# Patient Record
Sex: MALE | Race: WHITE | NOT HISPANIC OR LATINO | Employment: FULL TIME | URBAN - METROPOLITAN AREA
[De-identification: names, ages, dates, MRNs, and addresses within clinical notes are randomized per-mention and may not be internally consistent; named-entity substitution may affect disease eponyms.]

---

## 2023-07-19 ENCOUNTER — TRANSFERRED RECORDS (OUTPATIENT)
Dept: HEALTH INFORMATION MANAGEMENT | Facility: CLINIC | Age: 63
End: 2023-07-19

## 2024-02-13 ENCOUNTER — TRANSCRIBE ORDERS (OUTPATIENT)
Dept: OTHER | Age: 64
End: 2024-02-13

## 2024-02-13 ENCOUNTER — TRANSFERRED RECORDS (OUTPATIENT)
Dept: ONCOLOGY | Facility: CLINIC | Age: 64
End: 2024-02-13
Payer: COMMERCIAL

## 2024-02-13 DIAGNOSIS — D46.9 MYELODYSPLASTIC SYNDROME (H): Primary | ICD-10-CM

## 2024-02-15 ENCOUNTER — PATIENT OUTREACH (OUTPATIENT)
Dept: ONCOLOGY | Facility: CLINIC | Age: 64
End: 2024-02-15
Payer: COMMERCIAL

## 2024-02-15 NOTE — PROGRESS NOTES
[facility]: Cancer Care                                                                   Hem/Onc  Referral reviewed  Adult Oncology/Hematology  Referral [[id number]]    Electronically signed by: Blakemore, Thirisia on 02/13/24 1120 Status: Active   Ordering user: Blakemore, Thirisia 02/13/24 1120 Ordering provider: Abstract, Provider     Referral Details    Referred By  Referred To   [facility] External Data Department    Diagnoses: Myelodysplastic syndrome (H)   Order: Adult Oncology/Hematology  Referral       Comment: Referral for consult for Myelodysplastic Syndrome. Referred by Dr. August Mcgee MD at [facility] [address] [place] [address]. Ph. [phone] Fax. [phone]. Referral from fax.        ASSESSMENT      Clinical History (per Nurse review of records provided):    63 year old male patient with MDS referred by oncologist in [place], [place]  Lives in [facility]     Records Location: [facility]     Medical Oncology Follow Up    Date of Visit: 2/12/24    PATIENT: Willie Charles, 63yr old male   PCP: Steff Jones DO  MRN: [id number]    Reason for Visit: MDS.     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  - Prior therapy - Azacitidine. Currently on low dose revlimid 2.5 mg per [facility] recommendation. M/W/F 3 weeks on 1 week off due to hypocellular marrow and +2 fibrosis.  - Will schedule repeat bone marrow biopsy. He has an appointment with [facility] by the end of March. He wishes to be referred to [facility] and the [facility] due to closer distance and since his family members live in [place].   - He is currently week 3 of revlimid. He will take the last dose this Thursday and then 1 week off. Will schedule bone marrow biopsy next week.   - Weekly lab. RTC in 2 weeks.    # Neutropenia 2/2 MDS and chemo.  ANC 0.9. Continue acyclovir. Willhold fluconazole and levofloxacin if ANC > 0.5.    #  Anemia 2/2 MDS.  - Workup at [facility] 11/2023; vitamin B6 copper LDH PNH testing all WNL.  - Consider transfusion if hemoglobin is less than 7.5 or symptomatic.    #Elevated ALT. Grade 1.   - Mildly elevated ALT. Monitor.    #Monoclonal B-cell.  CT scan was recommended by Dr. Squires to ensure there is no lymphoma. CT C/A/P was negative for lymphadenopathy.    #Mucositis.  Possibly due to chemo, mucositis or both.  - PRN oral magic mouth wash. Trial of oral dexa elixir. Swish around the tip of the tongue only and spit.     August Mcgee MD  Medical Oncology and Hematology  [facility]  INTERVENTION(S)                                                      February 15, 2024  OUTGOING CALL to pt, no answer. Left voicemail introducing my role as nurse navigator with [facility] hematology/oncology clinics and that we have recd the referral to  Requested callback to me directly to discuss timing and factors involved since he has upcoming BMBx, and oncology care locally and at [facility].    February 21, 2024  OUTGOING CALL to pt:  Introduced my role as nurse navigator with [facility] Hematology/Oncology dept and that we have recd the referral for dx of MDS    Pt confirms they are aware of the referral and states he had the bmbx today somewhat groggy, requests a  call him tomorrow when offered.  He clarifies that this will be a 3rd opinion, not necessarily a transfer of care. Explained that our records team will request slides from bmbx for [facility] path consult.Explained to pt that they will receive a call from our scheduling intake team and provided our call-back number below if needed. He has my direct number in  left on 2/15 if they have interim questions, concerns.  -Referral Triage Scheduling Instructions added to Hem/Onc  referral for Patient Access rep      PLAN                                                      Hem/Onc consult at [facility]  Cancer Care   [facility]       See records team's Pre-Visit encounter documentation for additional records retrieval information.    Abby Dunn, RN, BSN, OCN  Hematology/Oncology New Patient Nurse Navigator   [facility]  [phone]

## 2024-02-28 NOTE — TELEPHONE ENCOUNTER
RECORDS STATUS - ALL OTHER DIAGNOSIS      Action February 28, 2024 2:24 PM    Action Taken - Slides and is requested from [facility] sent to Valerio at [facility].      RECORDS RECEIVED FROM: [facility]   DATE RECEIVED:    NOTES STATUS DETAILS   OFFICE NOTE from referring provider [facility] 2/12/24: Dr. August Masters   OFFICE NOTE from medical oncologist [facility] 11/14/24: Dr. Lucas Covarrubias   OPERATIVE REPORT [facility] 7/19/23: BMB   MEDICATION LIST [facility]    LABS     PATHOLOGY REPORTS Report in [facility]  (Slides Requested)  [facility] [place] Tr[facility] #: [id number] 7/19/23: [id number]    Cytometry:  7/19/23: [id number]   ANYTHING RELATED TO DIAGNOSIS [facility] Most recent 2/24/24   GENONOMIC TESTING     TYPE: External: [facility] 8/11/23: [id number]

## 2024-03-22 ENCOUNTER — APPOINTMENT (OUTPATIENT)
Dept: LAB | Facility: CLINIC | Age: 64
End: 2024-03-22
Payer: COMMERCIAL

## 2024-03-22 ENCOUNTER — PATIENT OUTREACH (OUTPATIENT)
Dept: ONCOLOGY | Facility: CLINIC | Age: 64
End: 2024-03-22
Payer: COMMERCIAL

## 2024-03-22 ENCOUNTER — PATIENT OUTREACH (OUTPATIENT)
Dept: ONCOLOGY | Facility: CLINIC | Age: 64
End: 2024-03-22

## 2024-03-22 ENCOUNTER — PRE VISIT (OUTPATIENT)
Dept: ONCOLOGY | Facility: CLINIC | Age: 64
End: 2024-03-22
Payer: COMMERCIAL

## 2024-03-22 ENCOUNTER — ONCOLOGY VISIT (OUTPATIENT)
Dept: ONCOLOGY | Facility: CLINIC | Age: 64
End: 2024-03-22
Attending: STUDENT IN AN ORGANIZED HEALTH CARE EDUCATION/TRAINING PROGRAM
Payer: COMMERCIAL

## 2024-03-22 VITALS
DIASTOLIC BLOOD PRESSURE: 76 MMHG | RESPIRATION RATE: 19 BRPM | BODY MASS INDEX: 31.95 KG/M2 | HEART RATE: 98 BPM | HEIGHT: 69 IN | SYSTOLIC BLOOD PRESSURE: 146 MMHG | TEMPERATURE: 98.3 F | OXYGEN SATURATION: 98 % | WEIGHT: 215.7 LBS

## 2024-03-22 DIAGNOSIS — D46.9 MDS (MYELODYSPLASTIC SYNDROME) (H): Primary | ICD-10-CM

## 2024-03-22 DIAGNOSIS — D46.9 MDS (MYELODYSPLASTIC SYNDROME) (H): ICD-10-CM

## 2024-03-22 DIAGNOSIS — D46.9 MYELODYSPLASTIC SYNDROME (H): ICD-10-CM

## 2024-03-22 LAB
ALBUMIN SERPL BCG-MCNC: 4 G/DL (ref 3.5–5.2)
ALP SERPL-CCNC: 94 U/L (ref 40–150)
ALT SERPL W P-5'-P-CCNC: 126 U/L (ref 0–70)
ANION GAP SERPL CALCULATED.3IONS-SCNC: 9 MMOL/L (ref 7–15)
AST SERPL W P-5'-P-CCNC: 43 U/L (ref 0–45)
BASOPHILS # BLD AUTO: 0 10E3/UL (ref 0–0.2)
BASOPHILS NFR BLD AUTO: 1 %
BILIRUB SERPL-MCNC: 0.2 MG/DL
BUN SERPL-MCNC: 16 MG/DL (ref 8–23)
CALCIUM SERPL-MCNC: 9 MG/DL (ref 8.8–10.2)
CHLORIDE SERPL-SCNC: 107 MMOL/L (ref 98–107)
CREAT SERPL-MCNC: 0.92 MG/DL (ref 0.67–1.17)
DEPRECATED HCO3 PLAS-SCNC: 24 MMOL/L (ref 22–29)
EGFRCR SERPLBLD CKD-EPI 2021: >90 ML/MIN/1.73M2
EOSINOPHIL # BLD AUTO: 0.1 10E3/UL (ref 0–0.7)
EOSINOPHIL NFR BLD AUTO: 4 %
ERYTHROCYTE [DISTWIDTH] IN BLOOD BY AUTOMATED COUNT: 12.8 % (ref 10–15)
GLUCOSE SERPL-MCNC: 163 MG/DL (ref 70–99)
HCT VFR BLD AUTO: 20.3 % (ref 40–53)
HGB BLD-MCNC: 7.1 G/DL (ref 13.3–17.7)
IMM GRANULOCYTES # BLD: 0 10E3/UL
IMM GRANULOCYTES NFR BLD: 0 %
LYMPHOCYTES # BLD AUTO: 2 10E3/UL (ref 0.8–5.3)
LYMPHOCYTES NFR BLD AUTO: 63 %
MCH RBC QN AUTO: 29.2 PG (ref 26.5–33)
MCHC RBC AUTO-ENTMCNC: 35 G/DL (ref 31.5–36.5)
MCV RBC AUTO: 84 FL (ref 78–100)
MONOCYTES # BLD AUTO: 0.2 10E3/UL (ref 0–1.3)
MONOCYTES NFR BLD AUTO: 6 %
NEUTROPHILS # BLD AUTO: 0.8 10E3/UL (ref 1.6–8.3)
NEUTROPHILS NFR BLD AUTO: 26 %
NRBC # BLD AUTO: 0 10E3/UL
NRBC BLD AUTO-RTO: 0 /100
PLATELET # BLD AUTO: 180 10E3/UL (ref 150–450)
POTASSIUM SERPL-SCNC: 4.2 MMOL/L (ref 3.4–5.3)
PROT SERPL-MCNC: 6.6 G/DL (ref 6.4–8.3)
RBC # BLD AUTO: 2.43 10E6/UL (ref 4.4–5.9)
SODIUM SERPL-SCNC: 140 MMOL/L (ref 135–145)
URATE SERPL-MCNC: 4.1 MG/DL (ref 3.4–7)
WBC # BLD AUTO: 3.1 10E3/UL (ref 4–11)

## 2024-03-22 PROCEDURE — 85025 COMPLETE CBC W/AUTO DIFF WBC: CPT | Performed by: STUDENT IN AN ORGANIZED HEALTH CARE EDUCATION/TRAINING PROGRAM

## 2024-03-22 PROCEDURE — 80053 COMPREHEN METABOLIC PANEL: CPT | Performed by: STUDENT IN AN ORGANIZED HEALTH CARE EDUCATION/TRAINING PROGRAM

## 2024-03-22 PROCEDURE — 99205 OFFICE O/P NEW HI 60 MIN: CPT | Performed by: STUDENT IN AN ORGANIZED HEALTH CARE EDUCATION/TRAINING PROGRAM

## 2024-03-22 PROCEDURE — 84550 ASSAY OF BLOOD/URIC ACID: CPT | Performed by: STUDENT IN AN ORGANIZED HEALTH CARE EDUCATION/TRAINING PROGRAM

## 2024-03-22 PROCEDURE — 99213 OFFICE O/P EST LOW 20 MIN: CPT | Performed by: STUDENT IN AN ORGANIZED HEALTH CARE EDUCATION/TRAINING PROGRAM

## 2024-03-22 PROCEDURE — G2211 COMPLEX E/M VISIT ADD ON: HCPCS | Performed by: STUDENT IN AN ORGANIZED HEALTH CARE EDUCATION/TRAINING PROGRAM

## 2024-03-22 PROCEDURE — 99417 PROLNG OP E/M EACH 15 MIN: CPT | Performed by: STUDENT IN AN ORGANIZED HEALTH CARE EDUCATION/TRAINING PROGRAM

## 2024-03-22 PROCEDURE — 36415 COLL VENOUS BLD VENIPUNCTURE: CPT | Performed by: STUDENT IN AN ORGANIZED HEALTH CARE EDUCATION/TRAINING PROGRAM

## 2024-03-22 PROCEDURE — 82668 ASSAY OF ERYTHROPOIETIN: CPT | Performed by: STUDENT IN AN ORGANIZED HEALTH CARE EDUCATION/TRAINING PROGRAM

## 2024-03-22 RX ORDER — MONTELUKAST SODIUM 10 MG/1
10 TABLET ORAL AT BEDTIME
COMMUNITY
Start: 2022-12-19

## 2024-03-22 RX ORDER — LANOLIN ALCOHOL/MO/W.PET/CERES
1000 CREAM (GRAM) TOPICAL DAILY
COMMUNITY
Start: 2023-05-30

## 2024-03-22 RX ORDER — FLUTICASONE PROPIONATE 50 MCG
1 SPRAY, SUSPENSION (ML) NASAL DAILY
COMMUNITY
Start: 2022-12-19

## 2024-03-22 RX ORDER — DEXAMETHASONE 0.5 MG/5ML
0.1 ELIXIR ORAL DAILY
COMMUNITY
Start: 2024-02-12 | End: 2025-02-16

## 2024-03-22 RX ORDER — AMLODIPINE BESYLATE 10 MG/1
1 TABLET ORAL DAILY
COMMUNITY
Start: 2022-12-19

## 2024-03-22 RX ORDER — METOPROLOL SUCCINATE 50 MG/1
1 TABLET, EXTENDED RELEASE ORAL DAILY
COMMUNITY
Start: 2022-12-19

## 2024-03-22 RX ORDER — LEVOTHYROXINE SODIUM 150 UG/1
TABLET ORAL
COMMUNITY
Start: 2023-02-27

## 2024-03-22 RX ORDER — SILDENAFIL CITRATE 20 MG/1
TABLET ORAL
COMMUNITY
Start: 2022-12-19

## 2024-03-22 RX ORDER — FOLIC ACID 1 MG/1
1 TABLET ORAL DAILY
COMMUNITY
Start: 2023-05-30

## 2024-03-22 RX ORDER — TIMOLOL MALEATE 5 MG/ML
1 SOLUTION/ DROPS OPHTHALMIC 2 TIMES DAILY
COMMUNITY
Start: 2022-11-15

## 2024-03-22 RX ORDER — LORATADINE 10 MG/1
10 TABLET ORAL DAILY
COMMUNITY

## 2024-03-22 RX ORDER — ASPIRIN 81 MG/1
81 TABLET ORAL DAILY
COMMUNITY
Start: 2023-08-15

## 2024-03-22 ASSESSMENT — PAIN SCALES - GENERAL: PAINLEVEL: NO PAIN (0)

## 2024-03-22 NOTE — PROGRESS NOTES
[facility]  HEMATOLOGY & ONCOLOGY  NEW PATIENT VISIT    PATIENT NAME: Willie Charles          MRN # [id number]  YOB: 1960  DATE OF VISIT:  Mar 22, 2024            REFERRING PROVIDER:   Mr. Willie Charles was seen at the request of Arely for further evaluation and/or management.       History of Presenting Illness  Mr. Willie Charles is a pleasant 63 year old male with a past medical history significant for hyperthyroisidism s/p radioactive iodine, HTN  and MDS dx in 7/2023 after he had fatigue for several months found to have bicytopenia with WBC 3.3 and hemoglobin 7.0 and platelets 308 that led to a bone marrow biopsy on 7/19/2023 that showed hypocellular marrow with 10 to 20% cellularity and 2% blasts with megakaryocytic hyperplasia with dysplasia.  Cytogenetics showed 5 q. deletion and NGS showed BCORL1 with a VAF of 3%.  He was started on lenalidomide 5 mg daily on 8/11/2023.  His anemia worsened after a few days and he was then started on concurrent azacitidine in addition to Revlimid on 8/28/2023.  As per records his counts trended down by 9/2023 and his Revlimid was discontinued.  He was continued on 2 more cycles of azacitidine until October 2023.  He did see Dr. Squires at [facility] in 11/2023 and was recommended to restart Revlimid starting at 2.5 mg dosing every other day for 3 weeks on and 1 week off.  He was started on 2.5 mg 2 times a week  and has been tolerating that for the last 3 months.  He has continued to require about 2-4 prbcs in a month.  Denies any fevers or chills or ongoing bleeding from anywhere.    Subjective  Patient is here with his wife Monica and son.  Patient denies any major side effects from the lenalidomide including diarrhea, nausea or rashes or any other recent infections.  He had previously been on antibacterial as well as antifungal medications when his absolute neutrophil count had trended down.  He is here at the [facility] for a  "second opinion.      Past Medical History  History reviewed. No pertinent past medical history.    Family History  History reviewed. No pertinent family history.    Social History  Smoking: Never  Alcohol use: occasionally drinks alcohol  Status:   Children/grandchildren: Did not ask.   Occupation: Housing  , .     [employer]  Current Outpatient Medications  Current Outpatient Medications   Medication Sig Dispense Refill    amLODIPine (NORVASC) 10 MG tablet Take 1 tablet by mouth daily      aspirin 81 MG EC tablet Take 81 mg by mouth      cyanocobalamin (VITAMIN B-12) 1000 MCG tablet Take 1,000 mcg by mouth      dexAMETHasone (DECADRON) 0.5 MG/5ML elixir Take 0.1 mg by mouth      fluticasone (FLONASE) 50 MCG/ACT nasal spray Spray 1 spray in nostril      folic acid (FOLVITE) 1 MG tablet Take 1 mg by mouth      levothyroxine (SYNTHROID/LEVOTHROID) 150 MCG tablet TAKE ONE TABLET BY MOUTH ONCE DAILY. DO NOT TAKE WITH IRON,ALUMINUM,MAGNESIUM,OR CALCIUM      loratadine (CLARITIN) 10 MG tablet Take 10 mg by mouth      metoprolol succinate ER (TOPROL XL) 50 MG 24 hr tablet Take 1 tablet by mouth daily      montelukast (SINGULAIR) 10 MG tablet Take 10 mg by mouth      sildenafil (REVATIO) 20 MG tablet Take 1 tab on empty stomach one hour prior to sexual activity; may increase to a max of 5 tabs      timolol maleate (TIMOPTIC) 0.5 % ophthalmic solution Apply 1 drop to eye         Allergies  Allergies   Allergen Reactions    Benazepril Other (See Comments)     Critical    Penicillin V Rash       Review of systems  A complete ROS was performed and was negative except as mentioned in HPI    Physical Exam  Vitals:    03/22/24 0942   BP: (!) 146/76   BP Location: Right arm   Patient Position: Sitting   Cuff Size: Adult Large   Pulse: 98   Resp: 19   Temp: 98.3  F (36.8  C)   TempSrc: Oral   SpO2: 98%   Weight: 97.8 kg (215 lb 11.2 oz)   Height: 1.74 m (5' 8.5\")     Wt Readings from Last 3 Encounters: " "  24 97.8 kg (215 lb 11.2 oz)       ECO-2   male sitting in chair in NAD  HEET: NC/AT, EOMI w/ PERRL, anicteric sclera. MMM. No mouth sores.   Neck: supple no JVP  Lymph: No cervical, supraclavicular, axillary or inguinal LAD  CV: normal S1,S2 with RRR no m/r/g  Resp: lungs CTA bilaterally with adequate air movement. No wheezes or crackles  Abd: soft, NTND, no organomegaly or masses. BS normoactive.   Ext: WWP no edema or cyanosis  Skin: no concerning lesions or rashes or petechiae  Neuro: A&Ox4, no lateralizing sx. Grossly nonfocal. Strength appears preserved.       Labs and Imaging    Sodium   Date Value Ref Range Status   2024 140 135 - 145 mmol/L Final     Comment:     Reference intervals for this test were updated on 2023 to more accurately reflect our healthy population. There may be differences in the flagging of prior results with similar values performed with this method. Interpretation of those prior results can be made in the context of the updated reference intervals.     ,   Potassium   Date Value Ref Range Status   2024 4.2 3.4 - 5.3 mmol/L Final      Creatinine   Date Value Ref Range Status   2024 0.92 0.67 - 1.17 mg/dL Final       No results found for: \"LDH\"      Uric Acid   Date Value Ref Range Status   2024 4.1 3.4 - 7.0 mg/dL Final       WBC Count   Date Value Ref Range Status   2024 3.1 (L) 4.0 - 11.0 10e3/uL Final   ,   Hemoglobin   Date Value Ref Range Status   2024 7.1 (L) 13.3 - 17.7 g/dL Final   ],   Platelet Count   Date Value Ref Range Status   2024 180 150 - 450 10e3/uL Final   ,    MCV   Date Value Ref Range Status   2024 84 78 - 100 fL Final         EPO level was 1213 on 2023.        Bone marrow biopsy 2023  FINAL DIAGNOSIS   Peripheral blood, bone marrow aspirate, biopsy and clot sections ([id number]; 2023):     1. Myelodysplastic syndrome with isolated del(5q).     2. Two small monotypic B-cell " populations (3% and 11% of total events) of uncertain significance, reportedly   detected by flow cytometric analysis. See comment.     COMMENT   The significance of the small monotypic B-cell populations identified by flow cytometric analysis is uncertain, as   diagnostic features of lymphoma are not seen by morphology or demonstrated by immunohistochemistry. These findings may   represent monoclonal B-cell lymphocytosis, although minimal bone marrow involvement by B-cell lymphoma cannot be   excluded. Clinical and radiographic correlation is recommended.  A lymph node or other extramedullary tissue   biopsy is suggested if lymphoma is clinically and/or radiographically suspected.   Cytogenetics 5q deletion.   NGS BCORL1 VAF 3%      Bone marrow 2/21/2024  Peripheral blood, bone marrow aspirate, touch imprint, core biopsy, and clot:     -  Pancytopenia.     -  Inadequate bone marrow aspirate, core biopsy and clot sections.      -  The flow cytometry ([id number]) of the bone marrow shows approximately 3% monotypic B-cells positive for CD5, CD19, CD20, CD23,  and lambda, approximately 14% monotypic B-cells positive for CD5 (variable), CD19, CD20 (dim to negative), CD23 (dim), CD38 (parital) and , with no definitive light chain expression, and no increased blast population identified.   The marrow aspirate is hemodilute. The clot sections show no marrow tissue. The biopsy core shows a tiny area (<5% core sections) with a cellular (~10%) bone marrow showing tri-lineage cells including clusters of small hypolobated megakaryocytes. The immunohistochemistry on the biopsy sections appears to show increased megakaryocytes and no definitive diagnostic features of lymphoma involvement. The presence of increased small hypolobated megakaryocytes suggests persistent disease of patient's known MDS with 5q deletion. Chromosome studies show that although 20 metaphases are routinely examined, only 1 was identified and  appeared normal.   The significance of the two populations of monotypic B-cells identified by flow cytometry is uncertain, as diagnostic features of lymphoma are not seen by morphology or immunohistochemistry in this inadequate marrow biopsy specimen. These findings may represent monoclonal B-cell lymphocytosis of undetermined significance or involvement of the peripheral blood by a B-cell lymphoma, although bone marrow involvement by B-cell lymphoma cannot be excluded.     Assessment and Plan  Mr. Willie Charles is a 63 year old male who is here for further evaluation and/or management of MDS.    Oncology:  MDS with 5q deletion  WHO Classification: MDS w low blasts and 5q deletion  Date of diagnosis: 7/19/2023  Revised IPSS score: 2.5 Low  Molecular IPSS: -0.42 Moderate low  Bone Marrow Blast %: 2  Cytogenetics: 5q del  Molecular: BCORL1 VAF 3%  Past Treatment: lenalidomide 5mg on 8/11/2023 to 9/2023 with addition of azacitidine since 8/28/2023 until 10/2023.   Current Treatment: Restarted lenalidomide 2.5mg twice a week for 3 weeks on 1 week off since 11/2023. Off since 3/12.     I discussed the pathophysiology and natural history of MDS with Mr. Willie Charles today. We went over the current prognostic models and scoring systems that are used in clinical practice as well as the potential for disease evolution into acute myeloid leukemia. We went over the current FDA approved treatments for myelodysplastic syndromes and covered that the only curative option is through an allogeneic hematopoietic cell transplantation. His disease is  Moderate low  risk and currently BMT is not recommended.     We discussed that MDS with 5 q. responds well to lenalidomide and that he is not received adequate amount of treatment with lenalidomide.  Given that he is tolerating lenalidomide at this very low minimal dose I recommended that he increase the lenalidomide to 2.5 mg 5 times a week for 3 weeks on and then 1 week off.   If he continues to tolerate this well we can continue increasing the dose to eventually reach 10 mg 3 weeks on and 1 week off.  He can possibly be started on other agents if his anemia is not responding to lenalidomide including agents like Luspatercept.    Plan:  he has had a molecular panel, we will ask for pathology slides and NGS studies from 2/21/2024.   labs checked today - see above results.  - RTC with me as virtual appointment in 1 month.   - c/w lenalidomide 2.5mg 5 days a week, 2 days off and 3 weeks on and 1 week off.   - c/w twice weekly labs and as needed transfusions at local oncologist.  - If ANC trends below 500 consistently for more than 1 week can start levofloxacin as prophylaxis.     Hematology:  Anemia/Thrombocytopenia:   Transfuse leukocyte reduced and irradiated blood products: 1 unit pRBC if hgb is 7.0-7.9, 2 units pRBCs if Hgb 6.0-6.9 g/dl, 1 unit platelets if PLT count is <20,000 or <50,000 with clinical bleeding.    Immunocompromised:  As a result of his disease and/or previous treatment. Mr. Willie Charles is immunocompromised. his last ANC is >500 and there is no need for prophylactic antibiotics at this time.     Cardiac:  Mr. Willie Charles has no history of heart disease.     Renal:  Creatinine results reviewed today. Mr. Willie Charles does not have any renal disease.    Hepatic:  Liver function tests reviewed today.    Psychosocial:  Mr. Willie Charles is coping well with his diagnosis.     All other questions and concerns were addressed and answered to Mr. Willie Charles's satisfaction.    Today, I spent a total of 110 minutes of face-to-face and non face-to-face time with Mr. Willie Charles. Time spent included review and discussion of diagnostic test results, patient counseling, and coordination of care.    The longitudinal plan of care for the diagnosis(es)/condition(s) as documented were addressed during this visit. Due to the added complexity in care, I will  continue to support Willie in the subsequent management and with ongoing continuity of care.    Haroon Ellis MD    Division of Hematology, Oncology and Transplantation  [facility]  P: [phone]

## 2024-03-22 NOTE — PROGRESS NOTES
[facility]: Cancer Care Initial Note                                    Discussion with Patient:                                                      Met with Gerg after office visit/consult with Dr. Ellis. Introduced self as RN Care Coordinator. Went over contact information for [facility]. Discussed roles of RNCC, MD, KARLO, nurse triage line, and clinic coordinators. Discussed how to get a hold of different team members via [employer] and at [phone]. Authorization to discuss information form signed, gave permission to speak with spouse Monica, and step-son Romario.       Assessment:                                                      Initial  Current living arrangement:: I live in a private home with family  Type of residence:: Private home - stairs  Informal Support system:: Family  Equipment Currently Used at Home: none  Bed or wheelchair confined:: No  Mobility Status: Independent  Transportation means:: Regular car  Medication adherence problem (GOAL):: No  Knowledgeable about how to use meds:: Yes  Medication side effects suspected:: No      Intervention/Education provided during outreach:                                                       Patient see's a local oncologist closer to him, oncologist's rotate through the cancer center so often does not see a consistent person.   Will see Dr. Ellis intermittently for follow up.   Currently getting PO chemo through local oncology office and will continue to at this time.   Brandt for medication ran out, advised speaking with current pharmacy staff about extending a brandt, and reaching out to [facility] for further assistance.   Multiple questions about switching to medicare and insurance, provided phone number for senior linkage line.   Will follow up virtually with Dr. Ellis in one month.     Patient to follow up as scheduled at next appt  Patient to call/[employer] message with updates  Confirmed patient has clinic and triage  numbers    Signature:  Abby Reyes RN

## 2024-03-22 NOTE — NURSING NOTE
Chief Complaint   Patient presents with    Blood Draw     Labs drawn via  by RN.      Labs drawn with  by RN. Vitals taken. Patient checked into next appointment.    Kim Cao RN

## 2024-03-22 NOTE — LETTER
3/22/2024         RE: Willie Charles  [address]  [facility] [address]        Dear Colleague,    Thank you for referring your patient, Willie Charles, to the [facility]. Please see a copy of my visit note below.    [facility]  HEMATOLOGY & ONCOLOGY  NEW PATIENT VISIT    PATIENT NAME: Willie Charles          MRN # [id number]  YOB: 1960  DATE OF VISIT:  Mar 22, 2024            REFERRING PROVIDER:   Mr. Willie Charles was seen at the request of Arely for further evaluation and/or management.       History of Presenting Illness  Mr. Willie Charles is a pleasant 63 year old male with a past medical history significant for hyperthyroisidism s/p radioactive iodine, HTN  and MDS dx in 7/2023 after he had fatigue for several months found to have bicytopenia with WBC 3.3 and hemoglobin 7.0 and platelets 308 that led to a bone marrow biopsy on 7/19/2023 that showed hypocellular marrow with 10 to 20% cellularity and 2% blasts with megakaryocytic hyperplasia with dysplasia.  Cytogenetics showed 5 q. deletion and NGS showed BCORL1 with a VAF of 3%.  He was started on lenalidomide 5 mg daily on 8/11/2023.  His anemia worsened after a few days and he was then started on concurrent azacitidine in addition to Revlimid on 8/28/2023.  As per records his counts trended down by 9/2023 and his Revlimid was discontinued.  He was continued on 2 more cycles of azacitidine until October 2023.  He did see Dr. Squires at [facility] in 11/2023 and was recommended to restart Revlimid starting at 2.5 mg dosing every other day for 3 weeks on and 1 week off.  He was started on 2.5 mg 2 times a week  and has been tolerating that for the last 3 months.  He has continued to require about 2-4 prbcs in a month.  Denies any fevers or chills or ongoing bleeding from anywhere.    Subjective  Patient is here with his wife Monica and son.  Patient denies any major side effects from the  lenalidomide including diarrhea, nausea or rashes or any other recent infections.  He had previously been on antibacterial as well as antifungal medications when his absolute neutrophil count had trended down.  He is here at the [facility] cancer clinic for a second opinion.      Past Medical History  History reviewed. No pertinent past medical history.    Family History  History reviewed. No pertinent family history.    Social History  Smoking: Never  Alcohol use: occasionally drinks alcohol  Status:   Children/grandchildren: Did not ask.   Occupation: Housing  , .     [employer]  Current Outpatient Medications  Current Outpatient Medications   Medication Sig Dispense Refill    amLODIPine (NORVASC) 10 MG tablet Take 1 tablet by mouth daily      aspirin 81 MG EC tablet Take 81 mg by mouth      cyanocobalamin (VITAMIN B-12) 1000 MCG tablet Take 1,000 mcg by mouth      dexAMETHasone (DECADRON) 0.5 MG/5ML elixir Take 0.1 mg by mouth      fluticasone (FLONASE) 50 MCG/ACT nasal spray Spray 1 spray in nostril      folic acid (FOLVITE) 1 MG tablet Take 1 mg by mouth      levothyroxine (SYNTHROID/LEVOTHROID) 150 MCG tablet TAKE ONE TABLET BY MOUTH ONCE DAILY. DO NOT TAKE WITH IRON,ALUMINUM,MAGNESIUM,OR CALCIUM      loratadine (CLARITIN) 10 MG tablet Take 10 mg by mouth      metoprolol succinate ER (TOPROL XL) 50 MG 24 hr tablet Take 1 tablet by mouth daily      montelukast (SINGULAIR) 10 MG tablet Take 10 mg by mouth      sildenafil (REVATIO) 20 MG tablet Take 1 tab on empty stomach one hour prior to sexual activity; may increase to a max of 5 tabs      timolol maleate (TIMOPTIC) 0.5 % ophthalmic solution Apply 1 drop to eye         Allergies  Allergies   Allergen Reactions    Benazepril Other (See Comments)     Critical    Penicillin V Rash       Review of systems  A complete ROS was performed and was negative except as mentioned in HPI    Physical Exam  Vitals:    03/22/24 0942   BP: (!) 146/76  "  BP Location: Right arm   Patient Position: Sitting   Cuff Size: Adult Large   Pulse: 98   Resp: 19   Temp: 98.3  F (36.8  C)   TempSrc: Oral   SpO2: 98%   Weight: 97.8 kg (215 lb 11.2 oz)   Height: 1.74 m (5' 8.5\")     Wt Readings from Last 3 Encounters:   24 97.8 kg (215 lb 11.2 oz)       ECO-2   male sitting in chair in NAD  HEET: NC/AT, EOMI w/ PERRL, anicteric sclera. MMM. No mouth sores.   Neck: supple no JVP  Lymph: No cervical, supraclavicular, axillary or inguinal LAD  CV: normal S1,S2 with RRR no m/r/g  Resp: lungs CTA bilaterally with adequate air movement. No wheezes or crackles  Abd: soft, NTND, no organomegaly or masses. BS normoactive.   Ext: WWP no edema or cyanosis  Skin: no concerning lesions or rashes or petechiae  Neuro: A&Ox4, no lateralizing sx. Grossly nonfocal. Strength appears preserved.       Labs and Imaging    Sodium   Date Value Ref Range Status   2024 140 135 - 145 mmol/L Final     Comment:     Reference intervals for this test were updated on 2023 to more accurately reflect our healthy population. There may be differences in the flagging of prior results with similar values performed with this method. Interpretation of those prior results can be made in the context of the updated reference intervals.     ,   Potassium   Date Value Ref Range Status   2024 4.2 3.4 - 5.3 mmol/L Final      Creatinine   Date Value Ref Range Status   2024 0.92 0.67 - 1.17 mg/dL Final       No results found for: \"LDH\"      Uric Acid   Date Value Ref Range Status   2024 4.1 3.4 - 7.0 mg/dL Final       WBC Count   Date Value Ref Range Status   2024 3.1 (L) 4.0 - 11.0 10e3/uL Final   ,   Hemoglobin   Date Value Ref Range Status   2024 7.1 (L) 13.3 - 17.7 g/dL Final   ],   Platelet Count   Date Value Ref Range Status   2024 180 150 - 450 10e3/uL Final   ,    MCV   Date Value Ref Range Status   2024 84 78 - 100 fL Final         EPO " level was 1213 on 9/2023.        Bone marrow biopsy 7/19/2023  FINAL DIAGNOSIS   Peripheral blood, bone marrow aspirate, biopsy and clot sections ([id number]; 07/19/2023):     1. Myelodysplastic syndrome with isolated del(5q).     2. Two small monotypic B-cell populations (3% and 11% of total events) of uncertain significance, reportedly   detected by flow cytometric analysis. See comment.     COMMENT   The significance of the small monotypic B-cell populations identified by flow cytometric analysis is uncertain, as   diagnostic features of lymphoma are not seen by morphology or demonstrated by immunohistochemistry. These findings may   represent monoclonal B-cell lymphocytosis, although minimal bone marrow involvement by B-cell lymphoma cannot be   excluded. Clinical and radiographic correlation is recommended.  A lymph node or other extramedullary tissue   biopsy is suggested if lymphoma is clinically and/or radiographically suspected.   Cytogenetics 5q deletion.   NGS BCORL1 VAF 3%      Bone marrow 2/21/2024  Peripheral blood, bone marrow aspirate, touch imprint, core biopsy, and clot:     -  Pancytopenia.     -  Inadequate bone marrow aspirate, core biopsy and clot sections.      -  The flow cytometry ([id number]) of the bone marrow shows approximately 3% monotypic B-cells positive for CD5, CD19, CD20, CD23,  and lambda, approximately 14% monotypic B-cells positive for CD5 (variable), CD19, CD20 (dim to negative), CD23 (dim), CD38 (parital) and , with no definitive light chain expression, and no increased blast population identified.   The marrow aspirate is hemodilute. The clot sections show no marrow tissue. The biopsy core shows a tiny area (<5% core sections) with a cellular (~10%) bone marrow showing tri-lineage cells including clusters of small hypolobated megakaryocytes. The immunohistochemistry on the biopsy sections appears to show increased megakaryocytes and no definitive diagnostic  features of lymphoma involvement. The presence of increased small hypolobated megakaryocytes suggests persistent disease of patient's known MDS with 5q deletion. Chromosome studies show that although 20 metaphases are routinely examined, only 1 was identified and appeared normal.   The significance of the two populations of monotypic B-cells identified by flow cytometry is uncertain, as diagnostic features of lymphoma are not seen by morphology or immunohistochemistry in this inadequate marrow biopsy specimen. These findings may represent monoclonal B-cell lymphocytosis of undetermined significance or involvement of the peripheral blood by a B-cell lymphoma, although bone marrow involvement by B-cell lymphoma cannot be excluded.     Assessment and Plan  Mr. Willie Charles is a 63 year old male who is here for further evaluation and/or management of MDS.    Oncology:  MDS with 5q deletion  WHO Classification: MDS w low blasts and 5q deletion  Date of diagnosis: 7/19/2023  Revised IPSS score: 2.5 Low  Molecular IPSS: -0.42 Moderate low  Bone Marrow Blast %: 2  Cytogenetics: 5q del  Molecular: BCORL1 VAF 3%  Past Treatment: lenalidomide 5mg on 8/11/2023 to 9/2023 with addition of azacitidine since 8/28/2023 until 10/2023.   Current Treatment: Restarted lenalidomide 2.5mg twice a week for 3 weeks on 1 week off since 11/2023. Off since 3/12.     I discussed the pathophysiology and natural history of MDS with Mr. Willie Charles today. We went over the current prognostic models and scoring systems that are used in clinical practice as well as the potential for disease evolution into acute myeloid leukemia. We went over the current FDA approved treatments for myelodysplastic syndromes and covered that the only curative option is through an allogeneic hematopoietic cell transplantation. His disease is  Moderate low  risk and currently BMT is not recommended.     We discussed that MDS with 5 q. responds well to  lenalidomide and that he is not received adequate amount of treatment with lenalidomide.  Given that he is tolerating lenalidomide at this very low minimal dose I recommended that he increase the lenalidomide to 2.5 mg 5 times a week for 3 weeks on and then 1 week off.  If he continues to tolerate this well we can continue increasing the dose to eventually reach 10 mg 3 weeks on and 1 week off.  He can possibly be started on other agents if his anemia is not responding to lenalidomide including agents like Luspatercept.    Plan:  he has had a molecular panel, we will ask for pathology slides and NGS studies from 2/21/2024.   labs checked today - see above results.  - RTC with me as virtual appointment in 1 month.   - c/w lenalidomide 2.5mg 5 days a week, 2 days off and 3 weeks on and 1 week off.   - c/w twice weekly labs and as needed transfusions at local oncologist.  - If ANC trends below 500 consistently for more than 1 week can start levofloxacin as prophylaxis.     Hematology:  Anemia/Thrombocytopenia:   Transfuse leukocyte reduced and irradiated blood products: 1 unit pRBC if hgb is 7.0-7.9, 2 units pRBCs if Hgb 6.0-6.9 g/dl, 1 unit platelets if PLT count is <20,000 or <50,000 with clinical bleeding.    Immunocompromised:  As a result of his disease and/or previous treatment. Mr. Willie Charles is immunocompromised. his last ANC is >500 and there is no need for prophylactic antibiotics at this time.     Cardiac:  Mr. Willie Charles has no history of heart disease.     Renal:  Creatinine results reviewed today. Mr. Willie Charles does not have any renal disease.    Hepatic:  Liver function tests reviewed today.    Psychosocial:  Mr. Willie Charles is coping well with his diagnosis.     All other questions and concerns were addressed and answered to Mr. Willie Charles's satisfaction.    Today, I spent a total of 110 minutes of face-to-face and non face-to-face time with Mr. Willie Charles. Time  spent included review and discussion of diagnostic test results, patient counseling, and coordination of care.    The longitudinal plan of care for the diagnosis(es)/condition(s) as documented were addressed during this visit. Due to the added complexity in care, I will continue to support Willie in the subsequent management and with ongoing continuity of care.    Haroon Ellis MD    Division of Hematology, Oncology and Transplantation  [facility]  P: [phone]

## 2024-03-23 LAB — EPO SERPL-ACNC: 4265 MU/ML

## 2024-04-15 ENCOUNTER — PATIENT OUTREACH (OUTPATIENT)
Dept: ONCOLOGY | Facility: CLINIC | Age: 64
End: 2024-04-15
Payer: COMMERCIAL

## 2024-04-15 NOTE — PROGRESS NOTES
[facility]: Cancer Care                                                                                          Voicemail forwarded to [facility] from Jimi from about a week ago with questions about medication orders for insurance.   Per last note Jimi is seeing a local oncologist that is prescribing his medications and there is no current plan prescribed by Dr. Ellis.     Writer returned call to Jimi who stated that the local oncologist was finally going to increase the dose of his medication once he finishes this month. Jimi states he doesn't see a consistent oncologist as they rotate through his local clinic.   Writer encouraged Jimi to continue to advocate for himself and ask about dose increases when he see's them. Advised Jimi that if insurance needs something about the order changed to fill the prescription he needs to speak to the prescribing doctor.     Jimi verbalized understanding. Provided Jimi with the clinic number and way to reach [facility].   Has a follow up video visit with Dr. Ellis on 4/26.     Signature:  Abby Reyes RN

## 2024-04-26 ENCOUNTER — VIRTUAL VISIT (OUTPATIENT)
Dept: ONCOLOGY | Facility: CLINIC | Age: 64
End: 2024-04-26
Attending: STUDENT IN AN ORGANIZED HEALTH CARE EDUCATION/TRAINING PROGRAM
Payer: COMMERCIAL

## 2024-04-26 VITALS — WEIGHT: 217 LBS | BODY MASS INDEX: 32.89 KG/M2 | HEIGHT: 68 IN

## 2024-04-26 DIAGNOSIS — D46.9 MYELODYSPLASTIC SYNDROME (H): Primary | ICD-10-CM

## 2024-04-26 PROCEDURE — 99417 PROLNG OP E/M EACH 15 MIN: CPT | Mod: 95 | Performed by: STUDENT IN AN ORGANIZED HEALTH CARE EDUCATION/TRAINING PROGRAM

## 2024-04-26 PROCEDURE — G2211 COMPLEX E/M VISIT ADD ON: HCPCS | Mod: 95 | Performed by: STUDENT IN AN ORGANIZED HEALTH CARE EDUCATION/TRAINING PROGRAM

## 2024-04-26 PROCEDURE — 99215 OFFICE O/P EST HI 40 MIN: CPT | Mod: 95 | Performed by: STUDENT IN AN ORGANIZED HEALTH CARE EDUCATION/TRAINING PROGRAM

## 2024-04-26 ASSESSMENT — PAIN SCALES - GENERAL: PAINLEVEL: NO PAIN (0)

## 2024-04-26 NOTE — PROGRESS NOTES
[facility]  HEMATOLOGY & ONCOLOGY  FOLLOW UP VISIT    PATIENT NAME: Willie Charles          MRN # [id number]  YOB: 1960  DATE OF VISIT:  Apr 26, 2024            REFERRING PROVIDER:   Mr. Willie Charles was seen at the request of Arely for further evaluation and/or management.     Video visit    History of Presenting Illness  Mr. Willie Charles is a pleasant 63 year old male with a past medical history significant for hyperthyroisidism s/p radioactive iodine, HTN  and MDS dx in 7/2023 after he had fatigue for several months found to have bicytopenia with WBC 3.3 and hemoglobin 7.0 and platelets 308 that led to a bone marrow biopsy on 7/19/2023 that showed hypocellular marrow with 10 to 20% cellularity and 2% blasts with megakaryocytic hyperplasia with dysplasia.  Cytogenetics showed 5 q. deletion and NGS showed BCORL1 with a VAF of 3%.  He was started on lenalidomide 5 mg daily on 8/11/2023.  His anemia worsened after a few days and he was then started on concurrent azacitidine in addition to Revlimid on 8/28/2023.  As per records his counts trended down by 9/2023 and his Revlimid was discontinued.  He was continued on 2 more cycles of azacitidine until October 2023.  He did see Dr. Squires at [facility] in 11/2023 and was recommended to restart Revlimid starting at 2.5 mg dosing every other day for 3 weeks on and 1 week off.  He was started on 2.5 mg 2 times a week  and has been tolerating that for the last 3 months.  He has continued to require about 2-4 prbcs in a month.  Denies any fevers or chills or ongoing bleeding from anywhere.    Subjective  Patient is on video with his wife Monica. Has needed blood products once a week. Admitted 4/18 with a fever and given IV antibiotics. Discharged 4/22. Possible pericarditis. Started on steroids and his diabetes is worse. Was started on Metformin. Patient denies any major side effects from the lenalidomide including diarrhea, nausea or rashes.   "He had previously been on antibacterial as well as antifungal medications when his absolute neutrophil count had trended down.     Past Medical History  No past medical history on file.    Family History  No family history on file.    Social History  Smoking: Never  Alcohol use: occasionally drinks alcohol  Status:   Children/grandchildren: Did not ask.   Occupation: Housing  , .     [employer]  Current Outpatient Medications  Current Outpatient Medications   Medication Sig Dispense Refill    aspirin 81 MG EC tablet Take 81 mg by mouth      cyanocobalamin (VITAMIN B-12) 1000 MCG tablet Take 1,000 mcg by mouth      dexAMETHasone (DECADRON) 0.5 MG/5ML elixir Take 0.1 mg by mouth      fluticasone (FLONASE) 50 MCG/ACT nasal spray Spray 1 spray in nostril      folic acid (FOLVITE) 1 MG tablet Take 1 mg by mouth      levothyroxine (SYNTHROID/LEVOTHROID) 150 MCG tablet TAKE ONE TABLET BY MOUTH ONCE DAILY. DO NOT TAKE WITH IRON,ALUMINUM,MAGNESIUM,OR CALCIUM      loratadine (CLARITIN) 10 MG tablet Take 10 mg by mouth      metoprolol succinate ER (TOPROL XL) 50 MG 24 hr tablet Take 1 tablet by mouth daily      montelukast (SINGULAIR) 10 MG tablet Take 10 mg by mouth      timolol maleate (TIMOPTIC) 0.5 % ophthalmic solution Apply 1 drop to eye      amLODIPine (NORVASC) 10 MG tablet Take 1 tablet by mouth daily      sildenafil (REVATIO) 20 MG tablet Take 1 tab on empty stomach one hour prior to sexual activity; may increase to a max of 5 tabs         Allergies  Allergies   Allergen Reactions    Benazepril Other (See Comments)     Critical    Penicillin V Rash       Review of systems  A complete ROS was performed and was negative except as mentioned in HPI    Physical Exam  Vitals:    24 1504   Weight: 98.4 kg (217 lb)   Height: 1.727 m (5' 8\")     Wt Readings from Last 3 Encounters:   24 98.4 kg (217 lb)   24 97.8 kg (215 lb 11.2 oz)       ECO-2   male sitting in chair " "in NAD  HEET: NC/AT, EOMI w/ PERRL, anicteric sclera. MMM. No mouth sores.   Neck: supple no JVP  Lymph: No cervical, supraclavicular, axillary or inguinal LAD  CV: normal S1,S2 with RRR no m/r/g  Resp: lungs CTA bilaterally with adequate air movement. No wheezes or crackles  Abd: soft, NTND, no organomegaly or masses. BS normoactive.   Ext: WWP no edema or cyanosis  Skin: no concerning lesions or rashes or petechiae  Neuro: A&Ox4, no lateralizing sx. Grossly nonfocal. Strength appears preserved.       Labs and Imaging    Sodium   Date Value Ref Range Status   03/22/2024 140 135 - 145 mmol/L Final     Comment:     Reference intervals for this test were updated on 09/26/2023 to more accurately reflect our healthy population. There may be differences in the flagging of prior results with similar values performed with this method. Interpretation of those prior results can be made in the context of the updated reference intervals.     ,   Potassium   Date Value Ref Range Status   03/22/2024 4.2 3.4 - 5.3 mmol/L Final      Creatinine   Date Value Ref Range Status   03/22/2024 0.92 0.67 - 1.17 mg/dL Final       No results found for: \"LDH\"      Uric Acid   Date Value Ref Range Status   03/22/2024 4.1 3.4 - 7.0 mg/dL Final       WBC Count   Date Value Ref Range Status   03/22/2024 3.1 (L) 4.0 - 11.0 10e3/uL Final   ,   Hemoglobin   Date Value Ref Range Status   03/22/2024 7.1 (L) 13.3 - 17.7 g/dL Final   ],   Platelet Count   Date Value Ref Range Status   03/22/2024 180 150 - 450 10e3/uL Final   ,    MCV   Date Value Ref Range Status   03/22/2024 84 78 - 100 fL Final         EPO level was 1213 on 9/2023.        Bone marrow biopsy 7/19/2023  FINAL DIAGNOSIS   Peripheral blood, bone marrow aspirate, biopsy and clot sections ([id number]; 07/19/2023):     1. Myelodysplastic syndrome with isolated del(5q).     2. Two small monotypic B-cell populations (3% and 11% of total events) of uncertain significance, reportedly "   detected by flow cytometric analysis. See comment.     COMMENT   The significance of the small monotypic B-cell populations identified by flow cytometric analysis is uncertain, as   diagnostic features of lymphoma are not seen by morphology or demonstrated by immunohistochemistry. These findings may   represent monoclonal B-cell lymphocytosis, although minimal bone marrow involvement by B-cell lymphoma cannot be   excluded. Clinical and radiographic correlation is recommended.  A lymph node or other extramedullary tissue   biopsy is suggested if lymphoma is clinically and/or radiographically suspected.   Cytogenetics 5q deletion.   NGS BCORL1 VAF 3%      Bone marrow 2/21/2024  Peripheral blood, bone marrow aspirate, touch imprint, core biopsy, and clot:     -  Pancytopenia.     -  Inadequate bone marrow aspirate, core biopsy and clot sections.      -  The flow cytometry ([id number]) of the bone marrow shows approximately 3% monotypic B-cells positive for CD5, CD19, CD20, CD23,  and lambda, approximately 14% monotypic B-cells positive for CD5 (variable), CD19, CD20 (dim to negative), CD23 (dim), CD38 (parital) and , with no definitive light chain expression, and no increased blast population identified.   The marrow aspirate is hemodilute. The clot sections show no marrow tissue. The biopsy core shows a tiny area (<5% core sections) with a cellular (~10%) bone marrow showing tri-lineage cells including clusters of small hypolobated megakaryocytes. The immunohistochemistry on the biopsy sections appears to show increased megakaryocytes and no definitive diagnostic features of lymphoma involvement. The presence of increased small hypolobated megakaryocytes suggests persistent disease of patient's known MDS with 5q deletion. Chromosome studies show that although 20 metaphases are routinely examined, only 1 was identified and appeared normal.   The significance of the two populations of monotypic B-cells  identified by flow cytometry is uncertain, as diagnostic features of lymphoma are not seen by morphology or immunohistochemistry in this inadequate marrow biopsy specimen. These findings may represent monoclonal B-cell lymphocytosis of undetermined significance or involvement of the peripheral blood by a B-cell lymphoma, although bone marrow involvement by B-cell lymphoma cannot be excluded.     Assessment and Plan  Mr. Willie Charles is a 63 year old male who is here for further evaluation and/or management of MDS.    Oncology:  MDS with 5q deletion  WHO Classification: MDS w low blasts and 5q deletion  Date of diagnosis: 7/19/2023  Revised IPSS score: 2.5 Low  Molecular IPSS: -0.42 Moderate low  Bone Marrow Blast %: 2  Cytogenetics: 5q del  Molecular: BCORL1 VAF 3%  Past Treatment: lenalidomide 5mg on 8/11/2023 to 9/2023 with addition of azacitidine since 8/28/2023 until 10/2023.   Current Treatment: Restarted lenalidomide 2.5mg twice a week for 3 weeks on 1 week off since 11/2023. Off since 3/12.     Had an admission 4/18 from 4/22 and stopped lenalidomide on 4/17.     I discussed the pathophysiology and natural history of MDS with Mr. Willie Charles today. We went over the current prognostic models and scoring systems that are used in clinical practice as well as the potential for disease evolution into acute myeloid leukemia. We went over the current FDA approved treatments for myelodysplastic syndromes and covered that the only curative option is through an allogeneic hematopoietic cell transplantation. His disease is  Moderate low  risk and currently BMT is not recommended.     We discussed that MDS with 5 q. responds well to lenalidomide and that he is not received adequate amount of treatment with lenalidomide.  Given that he is tolerating lenalidomide at this very low minimal dose I recommended that he increase the lenalidomide to 2.5 mg 5 times a week for 3 weeks on and then 1 week off.  If he  continues to tolerate this well we can continue increasing the dose to eventually reach 10 mg 3 weeks on and 1 week off.  He can possibly be started on other agents if his anemia is not responding to lenalidomide including agents like Luspatercept.    He had a recent admission for neutropenic fever s/p antibiotics without blood cultures showing anything.  He did have diffuse ST elevations concerning for stefan/pericarditis.  Course was complicated by A-fib with RVR.  Not sure if the troponin leak was secondary to the RVR.  Though there are rare cases of arrhythmias with lenalidomide doubt if this was related to it.    Plan:  he has had a molecular panel, we will ask for pathology slides and NGS studies from 2/21/2024.   - c/w lenalidomide 2.5mg q day for 3 weeks on and 1 wek off.   - RTC with me in person in 4 weeks on 5/29 afternoon.  - c/w twice weekly labs and as needed transfusions at local oncologist.  - If ANC trends below 500 consistently for more than 1 week can start levofloxacin as prophylaxis.     Hematology:  Anemia/Thrombocytopenia:   Transfuse leukocyte reduced and irradiated blood products: 1 unit pRBC if hgb is 7.0-7.9, 2 units pRBCs if Hgb 6.0-6.9 g/dl, 1 unit platelets if PLT count is <20,000 or <50,000 with clinical bleeding.    Immunocompromised:  As a result of his disease and/or previous treatment. Mr. Willie Charles is immunocompromised. his last ANC is >500 and there is no need for prophylactic antibiotics at this time.     Cardiac:  Mr. Willie Charles has no history of heart disease.     Renal:  Creatinine results reviewed today. Mr. Willie Charles does not have any renal disease.    Hepatic:  Liver function tests reviewed today.    Psychosocial:  Mr. Willie Charles is coping well with his diagnosis.     All other questions and concerns were addressed and answered to Mr. Willie Charles's satisfaction.    Today, I spent a total of 60 minutes of face-to-face and non face-to-face  time with . Willie Charles. Time spent included review and discussion of diagnostic test results, patient counseling, and coordination of care.    The longitudinal plan of care for the diagnosis(es)/condition(s) as documented were addressed during this visit. Due to the added complexity in care, I will continue to support Willie in the subsequent management and with ongoing continuity of care.    Haroon Ellis MD    Division of Hematology, Oncology and Transplantation  [facility]  P: [phone]

## 2024-04-26 NOTE — NURSING NOTE
Is the patient currently in the state of [place]? YES    Visit mode:VIDEO    If the visit is dropped, the patient can be reconnected by: VIDEO VISIT: Send to e-mail at: [email or web address]    Will anyone else be joining the visit? NO  (If patient encounters technical issues they should call [phone]    How would you like to obtain your AVS? MyChart    Are changes needed to the allergy or medication list? No    Are refills needed on medications prescribed by this physician? NO    Reason for visit: DURAN LOPEZ

## 2024-04-26 NOTE — PROGRESS NOTES
Virtual Visit Details    Type of service:  Video Visit     Originating Location (pt. Location): Home    Distant Location (provider location):  On-site  Platform used for Video Visit: TrustAlert    Start time 3:38pm   End time  4:02pm.

## 2024-04-26 NOTE — LETTER
4/26/2024         RE: Willie Charles  [address]  [facility] [address]        Dear Colleague,    Thank you for referring your patient, Willie Charles, to the [facility]. Please see a copy of my visit note below.    Virtual Visit Details    Type of service:  Video Visit     Originating Location (pt. Location): Home    Distant Location (provider location):  On-site  Platform used for Video Visit: Socialtext    Start time 3:38pm   End time  4:02pm.     [facility]  HEMATOLOGY & ONCOLOGY  FOLLOW UP VISIT    PATIENT NAME: Willie Charles          MRN # [id number]  YOB: 1960  DATE OF VISIT:  Apr 26, 2024            REFERRING PROVIDER:   Mr. Willie Charles was seen at the request of Arely for further evaluation and/or management.     Video visit    History of Presenting Illness  Mr. Willie Charles is a pleasant 63 year old male with a past medical history significant for hyperthyroisidism s/p radioactive iodine, HTN  and MDS dx in 7/2023 after he had fatigue for several months found to have bicytopenia with WBC 3.3 and hemoglobin 7.0 and platelets 308 that led to a bone marrow biopsy on 7/19/2023 that showed hypocellular marrow with 10 to 20% cellularity and 2% blasts with megakaryocytic hyperplasia with dysplasia.  Cytogenetics showed 5 q. deletion and NGS showed BCORL1 with a VAF of 3%.  He was started on lenalidomide 5 mg daily on 8/11/2023.  His anemia worsened after a few days and he was then started on concurrent azacitidine in addition to Revlimid on 8/28/2023.  As per records his counts trended down by 9/2023 and his Revlimid was discontinued.  He was continued on 2 more cycles of azacitidine until October 2023.  He did see Dr. Squires at [facility] in 11/2023 and was recommended to restart Revlimid starting at 2.5 mg dosing every other day for 3 weeks on and 1 week off.  He was started on 2.5 mg 2 times a week  and has been tolerating that for the  last 3 months.  He has continued to require about 2-4 prbcs in a month.  Denies any fevers or chills or ongoing bleeding from anywhere.    Subjective  Patient is on video with his wife Monica. Has needed blood products once a week. Admitted 4/18 with a fever and given IV antibiotics. Discharged 4/22. Possible pericarditis. Started on steroids and his diabetes is worse. Was started on Metformin. Patient denies any major side effects from the lenalidomide including diarrhea, nausea or rashes.  He had previously been on antibacterial as well as antifungal medications when his absolute neutrophil count had trended down.     Past Medical History  No past medical history on file.    Family History  No family history on file.    Social History  Smoking: Never  Alcohol use: occasionally drinks alcohol  Status:   Children/grandchildren: Did not ask.   Occupation: Housing  , .     [employer]  Current Outpatient Medications  Current Outpatient Medications   Medication Sig Dispense Refill    aspirin 81 MG EC tablet Take 81 mg by mouth      cyanocobalamin (VITAMIN B-12) 1000 MCG tablet Take 1,000 mcg by mouth      dexAMETHasone (DECADRON) 0.5 MG/5ML elixir Take 0.1 mg by mouth      fluticasone (FLONASE) 50 MCG/ACT nasal spray Spray 1 spray in nostril      folic acid (FOLVITE) 1 MG tablet Take 1 mg by mouth      levothyroxine (SYNTHROID/LEVOTHROID) 150 MCG tablet TAKE ONE TABLET BY MOUTH ONCE DAILY. DO NOT TAKE WITH IRON,ALUMINUM,MAGNESIUM,OR CALCIUM      loratadine (CLARITIN) 10 MG tablet Take 10 mg by mouth      metoprolol succinate ER (TOPROL XL) 50 MG 24 hr tablet Take 1 tablet by mouth daily      montelukast (SINGULAIR) 10 MG tablet Take 10 mg by mouth      timolol maleate (TIMOPTIC) 0.5 % ophthalmic solution Apply 1 drop to eye      amLODIPine (NORVASC) 10 MG tablet Take 1 tablet by mouth daily      sildenafil (REVATIO) 20 MG tablet Take 1 tab on empty stomach one hour prior to sexual activity;  "may increase to a max of 5 tabs         Allergies  Allergies   Allergen Reactions    Benazepril Other (See Comments)     Critical    Penicillin V Rash       Review of systems  A complete ROS was performed and was negative except as mentioned in HPI    Physical Exam  Vitals:    24 1504   Weight: 98.4 kg (217 lb)   Height: 1.727 m (5' 8\")     Wt Readings from Last 3 Encounters:   24 98.4 kg (217 lb)   24 97.8 kg (215 lb 11.2 oz)       ECO-2   male sitting in chair in NAD  HEET: NC/AT, EOMI w/ PERRL, anicteric sclera. MMM. No mouth sores.   Neck: supple no JVP  Lymph: No cervical, supraclavicular, axillary or inguinal LAD  CV: normal S1,S2 with RRR no m/r/g  Resp: lungs CTA bilaterally with adequate air movement. No wheezes or crackles  Abd: soft, NTND, no organomegaly or masses. BS normoactive.   Ext: WWP no edema or cyanosis  Skin: no concerning lesions or rashes or petechiae  Neuro: A&Ox4, no lateralizing sx. Grossly nonfocal. Strength appears preserved.       Labs and Imaging    Sodium   Date Value Ref Range Status   2024 140 135 - 145 mmol/L Final     Comment:     Reference intervals for this test were updated on 2023 to more accurately reflect our healthy population. There may be differences in the flagging of prior results with similar values performed with this method. Interpretation of those prior results can be made in the context of the updated reference intervals.     ,   Potassium   Date Value Ref Range Status   2024 4.2 3.4 - 5.3 mmol/L Final      Creatinine   Date Value Ref Range Status   2024 0.92 0.67 - 1.17 mg/dL Final       No results found for: \"LDH\"      Uric Acid   Date Value Ref Range Status   2024 4.1 3.4 - 7.0 mg/dL Final       WBC Count   Date Value Ref Range Status   2024 3.1 (L) 4.0 - 11.0 10e3/uL Final   ,   Hemoglobin   Date Value Ref Range Status   2024 7.1 (L) 13.3 - 17.7 g/dL Final   ],   Platelet Count   Date " Value Ref Range Status   03/22/2024 180 150 - 450 10e3/uL Final   ,    MCV   Date Value Ref Range Status   03/22/2024 84 78 - 100 fL Final         EPO level was 1213 on 9/2023.        Bone marrow biopsy 7/19/2023  FINAL DIAGNOSIS   Peripheral blood, bone marrow aspirate, biopsy and clot sections ([id number]; 07/19/2023):     1. Myelodysplastic syndrome with isolated del(5q).     2. Two small monotypic B-cell populations (3% and 11% of total events) of uncertain significance, reportedly   detected by flow cytometric analysis. See comment.     COMMENT   The significance of the small monotypic B-cell populations identified by flow cytometric analysis is uncertain, as   diagnostic features of lymphoma are not seen by morphology or demonstrated by immunohistochemistry. These findings may   represent monoclonal B-cell lymphocytosis, although minimal bone marrow involvement by B-cell lymphoma cannot be   excluded. Clinical and radiographic correlation is recommended.  A lymph node or other extramedullary tissue   biopsy is suggested if lymphoma is clinically and/or radiographically suspected.   Cytogenetics 5q deletion.   NGS BCORL1 VAF 3%      Bone marrow 2/21/2024  Peripheral blood, bone marrow aspirate, touch imprint, core biopsy, and clot:     -  Pancytopenia.     -  Inadequate bone marrow aspirate, core biopsy and clot sections.      -  The flow cytometry ([id number]) of the bone marrow shows approximately 3% monotypic B-cells positive for CD5, CD19, CD20, CD23,  and lambda, approximately 14% monotypic B-cells positive for CD5 (variable), CD19, CD20 (dim to negative), CD23 (dim), CD38 (parital) and , with no definitive light chain expression, and no increased blast population identified.   The marrow aspirate is hemodilute. The clot sections show no marrow tissue. The biopsy core shows a tiny area (<5% core sections) with a cellular (~10%) bone marrow showing tri-lineage cells including clusters of  small hypolobated megakaryocytes. The immunohistochemistry on the biopsy sections appears to show increased megakaryocytes and no definitive diagnostic features of lymphoma involvement. The presence of increased small hypolobated megakaryocytes suggests persistent disease of patient's known MDS with 5q deletion. Chromosome studies show that although 20 metaphases are routinely examined, only 1 was identified and appeared normal.   The significance of the two populations of monotypic B-cells identified by flow cytometry is uncertain, as diagnostic features of lymphoma are not seen by morphology or immunohistochemistry in this inadequate marrow biopsy specimen. These findings may represent monoclonal B-cell lymphocytosis of undetermined significance or involvement of the peripheral blood by a B-cell lymphoma, although bone marrow involvement by B-cell lymphoma cannot be excluded.     Assessment and Plan  Mr. Willie Charles is a 63 year old male who is here for further evaluation and/or management of MDS.    Oncology:  MDS with 5q deletion  WHO Classification: MDS w low blasts and 5q deletion  Date of diagnosis: 7/19/2023  Revised IPSS score: 2.5 Low  Molecular IPSS: -0.42 Moderate low  Bone Marrow Blast %: 2  Cytogenetics: 5q del  Molecular: BCORL1 VAF 3%  Past Treatment: lenalidomide 5mg on 8/11/2023 to 9/2023 with addition of azacitidine since 8/28/2023 until 10/2023.   Current Treatment: Restarted lenalidomide 2.5mg twice a week for 3 weeks on 1 week off since 11/2023. Off since 3/12.     Had an admission 4/18 from 4/22 and stopped lenalidomide on 4/17.     I discussed the pathophysiology and natural history of MDS with Mr. Willie Charles today. We went over the current prognostic models and scoring systems that are used in clinical practice as well as the potential for disease evolution into acute myeloid leukemia. We went over the current FDA approved treatments for myelodysplastic syndromes and covered  that the only curative option is through an allogeneic hematopoietic cell transplantation. His disease is  Moderate low  risk and currently BMT is not recommended.     We discussed that MDS with 5 q. responds well to lenalidomide and that he is not received adequate amount of treatment with lenalidomide.  Given that he is tolerating lenalidomide at this very low minimal dose I recommended that he increase the lenalidomide to 2.5 mg 5 times a week for 3 weeks on and then 1 week off.  If he continues to tolerate this well we can continue increasing the dose to eventually reach 10 mg 3 weeks on and 1 week off.  He can possibly be started on other agents if his anemia is not responding to lenalidomide including agents like Luspatercept.    He had a recent admission for neutropenic fever s/p antibiotics without blood cultures showing anything.  He did have diffuse ST elevations concerning for stefan/pericarditis.  Course was complicated by A-fib with RVR.  Not sure if the troponin leak was secondary to the RVR.  Though there are rare cases of arrhythmias with lenalidomide doubt if this was related to it.    Plan:  he has had a molecular panel, we will ask for pathology slides and NGS studies from 2/21/2024.   - c/w lenalidomide 2.5mg q day for 3 weeks on and 1 wek off.   - RTC with me in person in 4 weeks on 5/29 afternoon.  - c/w twice weekly labs and as needed transfusions at local oncologist.  - If ANC trends below 500 consistently for more than 1 week can start levofloxacin as prophylaxis.     Hematology:  Anemia/Thrombocytopenia:   Transfuse leukocyte reduced and irradiated blood products: 1 unit pRBC if hgb is 7.0-7.9, 2 units pRBCs if Hgb 6.0-6.9 g/dl, 1 unit platelets if PLT count is <20,000 or <50,000 with clinical bleeding.    Immunocompromised:  As a result of his disease and/or previous treatment. Mr. Willie Charles is immunocompromised. his last ANC is >500 and there is no need for prophylactic antibiotics  at this time.     Cardiac:  Mr. Willie Charles has no history of heart disease.     Renal:  Creatinine results reviewed today. Mr. Willie Charles does not have any renal disease.    Hepatic:  Liver function tests reviewed today.    Psychosocial:  Mr. Willie Charles is coping well with his diagnosis.     All other questions and concerns were addressed and answered to Mr. Willie Charles's satisfaction.    Today, I spent a total of 60 minutes of face-to-face and non face-to-face time with Mr. Willie Charles. Time spent included review and discussion of diagnostic test results, patient counseling, and coordination of care.    The longitudinal plan of care for the diagnosis(es)/condition(s) as documented were addressed during this visit. Due to the added complexity in care, I will continue to support Willie in the subsequent management and with ongoing continuity of care.    Haroon Ellis MD    Division of Hematology, Oncology and Transplantation  [facility]  P: [phone]

## 2024-05-19 ENCOUNTER — HEALTH MAINTENANCE LETTER (OUTPATIENT)
Age: 64
End: 2024-05-19

## 2024-06-07 ENCOUNTER — LAB (OUTPATIENT)
Dept: LAB | Facility: CLINIC | Age: 64
End: 2024-06-07
Attending: STUDENT IN AN ORGANIZED HEALTH CARE EDUCATION/TRAINING PROGRAM
Payer: COMMERCIAL

## 2024-06-07 ENCOUNTER — ONCOLOGY VISIT (OUTPATIENT)
Dept: ONCOLOGY | Facility: CLINIC | Age: 64
End: 2024-06-07
Attending: STUDENT IN AN ORGANIZED HEALTH CARE EDUCATION/TRAINING PROGRAM
Payer: COMMERCIAL

## 2024-06-07 VITALS
SYSTOLIC BLOOD PRESSURE: 129 MMHG | RESPIRATION RATE: 16 BRPM | BODY MASS INDEX: 31.32 KG/M2 | OXYGEN SATURATION: 99 % | WEIGHT: 206 LBS | DIASTOLIC BLOOD PRESSURE: 69 MMHG | HEART RATE: 85 BPM

## 2024-06-07 VITALS
WEIGHT: 206 LBS | DIASTOLIC BLOOD PRESSURE: 70 MMHG | RESPIRATION RATE: 16 BRPM | OXYGEN SATURATION: 98 % | SYSTOLIC BLOOD PRESSURE: 154 MMHG | BODY MASS INDEX: 31.32 KG/M2 | HEART RATE: 87 BPM | TEMPERATURE: 98 F

## 2024-06-07 DIAGNOSIS — D46.9 MYELODYSPLASTIC SYNDROME (H): Primary | ICD-10-CM

## 2024-06-07 DIAGNOSIS — D46.9 MDS (MYELODYSPLASTIC SYNDROME) (H): ICD-10-CM

## 2024-06-07 LAB
ALBUMIN SERPL BCG-MCNC: 4.1 G/DL (ref 3.5–5.2)
ALP SERPL-CCNC: 90 U/L (ref 40–150)
ALT SERPL W P-5'-P-CCNC: 108 U/L (ref 0–70)
ANION GAP SERPL CALCULATED.3IONS-SCNC: 9 MMOL/L (ref 7–15)
AST SERPL W P-5'-P-CCNC: 51 U/L (ref 0–45)
BASOPHILS # BLD AUTO: 0 10E3/UL (ref 0–0.2)
BASOPHILS NFR BLD AUTO: 1 %
BILIRUB SERPL-MCNC: 0.3 MG/DL
BUN SERPL-MCNC: 20.5 MG/DL (ref 8–23)
CALCIUM SERPL-MCNC: 8.9 MG/DL (ref 8.8–10.2)
CHLORIDE SERPL-SCNC: 106 MMOL/L (ref 98–107)
CREAT SERPL-MCNC: 0.91 MG/DL (ref 0.67–1.17)
DEPRECATED HCO3 PLAS-SCNC: 24 MMOL/L (ref 22–29)
EGFRCR SERPLBLD CKD-EPI 2021: >90 ML/MIN/1.73M2
EOSINOPHIL # BLD AUTO: 0.2 10E3/UL (ref 0–0.7)
EOSINOPHIL NFR BLD AUTO: 7 %
ERYTHROCYTE [DISTWIDTH] IN BLOOD BY AUTOMATED COUNT: 13.7 % (ref 10–15)
GLUCOSE SERPL-MCNC: 150 MG/DL (ref 70–99)
HCT VFR BLD AUTO: 22.5 % (ref 40–53)
HGB BLD-MCNC: 7.7 G/DL (ref 13.3–17.7)
IMM GRANULOCYTES # BLD: 0 10E3/UL
IMM GRANULOCYTES NFR BLD: 0 %
LYMPHOCYTES # BLD AUTO: 1.1 10E3/UL (ref 0.8–5.3)
LYMPHOCYTES NFR BLD AUTO: 48 %
MCH RBC QN AUTO: 28.7 PG (ref 26.5–33)
MCHC RBC AUTO-ENTMCNC: 34.2 G/DL (ref 31.5–36.5)
MCV RBC AUTO: 84 FL (ref 78–100)
MONOCYTES # BLD AUTO: 0.2 10E3/UL (ref 0–1.3)
MONOCYTES NFR BLD AUTO: 7 %
NEUTROPHILS # BLD AUTO: 0.8 10E3/UL (ref 1.6–8.3)
NEUTROPHILS NFR BLD AUTO: 37 %
NRBC # BLD AUTO: 0 10E3/UL
NRBC BLD AUTO-RTO: 0 /100
PLATELET # BLD AUTO: 166 10E3/UL (ref 150–450)
POTASSIUM SERPL-SCNC: 4 MMOL/L (ref 3.4–5.3)
PROT SERPL-MCNC: 6.4 G/DL (ref 6.4–8.3)
RBC # BLD AUTO: 2.68 10E6/UL (ref 4.4–5.9)
SODIUM SERPL-SCNC: 139 MMOL/L (ref 135–145)
URATE SERPL-MCNC: 3.9 MG/DL (ref 3.4–7)
WBC # BLD AUTO: 2.3 10E3/UL (ref 4–11)

## 2024-06-07 PROCEDURE — 85025 COMPLETE CBC W/AUTO DIFF WBC: CPT

## 2024-06-07 PROCEDURE — G2211 COMPLEX E/M VISIT ADD ON: HCPCS | Performed by: STUDENT IN AN ORGANIZED HEALTH CARE EDUCATION/TRAINING PROGRAM

## 2024-06-07 PROCEDURE — 84155 ASSAY OF PROTEIN SERUM: CPT

## 2024-06-07 PROCEDURE — 84550 ASSAY OF BLOOD/URIC ACID: CPT

## 2024-06-07 PROCEDURE — 99215 OFFICE O/P EST HI 40 MIN: CPT | Performed by: STUDENT IN AN ORGANIZED HEALTH CARE EDUCATION/TRAINING PROGRAM

## 2024-06-07 PROCEDURE — G0463 HOSPITAL OUTPT CLINIC VISIT: HCPCS | Performed by: STUDENT IN AN ORGANIZED HEALTH CARE EDUCATION/TRAINING PROGRAM

## 2024-06-07 PROCEDURE — 36415 COLL VENOUS BLD VENIPUNCTURE: CPT

## 2024-06-07 RX ORDER — TRIAMCINOLONE ACETONIDE 1 MG/G
CREAM TOPICAL 2 TIMES DAILY
Qty: 453.6 G | Refills: 0 | Status: SHIPPED | OUTPATIENT
Start: 2024-06-07

## 2024-06-07 RX ORDER — DEFERASIROX 360 MG/1
360 TABLET, FILM COATED ORAL EVERY MORNING
COMMUNITY
Start: 2024-05-22 | End: 2024-06-07 | Stop reason: SINTOL

## 2024-06-07 RX ORDER — ACYCLOVIR 400 MG/1
400 TABLET ORAL 2 TIMES DAILY
COMMUNITY

## 2024-06-07 ASSESSMENT — PAIN SCALES - GENERAL
PAINLEVEL: NO PAIN (0)
PAINLEVEL: NO PAIN (0)

## 2024-06-07 NOTE — PROGRESS NOTES
[facility]  HEMATOLOGY & ONCOLOGY  FOLLOW UP VISIT    PATIENT NAME: Willie Charles          MRN # [id number]  YOB: 1960  DATE OF VISIT:  Jun 7, 2024            REFERRING PROVIDER:   Mr. Willie Charles was seen at the request of Arely for further evaluation and/or management.       History of Presenting Illness  Mr. Willie Charles is a pleasant 63 year old male with a past medical history significant for hyperthyroisidism s/p radioactive iodine, HTN  and MDS dx in 7/2023 after he had fatigue for several months found to have bicytopenia with WBC 3.3 and hemoglobin 7.0 and platelets 308 that led to a bone marrow biopsy on 7/19/2023 that showed hypocellular marrow with 10 to 20% cellularity and 2% blasts with megakaryocytic hyperplasia with dysplasia.  Cytogenetics showed 5 q. deletion and NGS showed BCORL1 with a VAF of 3%.  He was started on lenalidomide 5 mg daily on 8/11/2023.  His anemia worsened after a few days and he was then started on concurrent azacitidine in addition to Revlimid on 8/28/2023.  As per records his counts trended down by 9/2023 and his Revlimid was discontinued.  He was continued on 2 more cycles of azacitidine until October 2023.  He did see Dr. Squires at [place] in 11/2023 and was recommended to restart Revlimid starting at 2.5 mg dosing every other day for 3 weeks on and 1 week off.  He was started on 2.5 mg 2 times a week  and has been tolerating that for the last 3 months.He has continued to require about 2-4 prbcs in a month.  Denies any fevers or chills or ongoing bleeding from anywhere.    He has now established with the [facility] and has now been increased to lenalidomide 2.5mg daily for 3 weeks on 1 week off.     Subjective  Patient is here with his wife Monica and Son. Post recent discharge on 4/22 he was started back on lenalidomide and finished 3 weeks on and 1 week off of 2.5mg daily. Patient denies any major side effects from the lenalidomide  including diarrhea, nausea.  He has developed a new rash on his right side of his scalp which is itchy. He has been continued on antibacterial as well as antifungal medications.     Past Medical History  No past medical history on file.    Family History  No family history on file.    Social History  Smoking: Never  Alcohol use: occasionally drinks alcohol  Status:   Children/grandchildren: Did not ask.   Occupation: Housing  , .     [employer]  Current Outpatient Medications  Current Outpatient Medications   Medication Sig Dispense Refill    acyclovir (ZOVIRAX) 400 MG tablet Take 400 mg by mouth 2 times daily      amLODIPine (NORVASC) 10 MG tablet Take 1 tablet by mouth daily      aspirin 81 MG EC tablet Take 81 mg by mouth      cyanocobalamin (VITAMIN B-12) 1000 MCG tablet Take 1,000 mcg by mouth      dexAMETHasone (DECADRON) 0.5 MG/5ML elixir Take 0.1 mg by mouth      fluticasone (FLONASE) 50 MCG/ACT nasal spray Spray 1 spray in nostril      levothyroxine (SYNTHROID/LEVOTHROID) 150 MCG tablet TAKE ONE TABLET BY MOUTH ONCE DAILY. DO NOT TAKE WITH IRON,ALUMINUM,MAGNESIUM,OR CALCIUM      loratadine (CLARITIN) 10 MG tablet Take 10 mg by mouth      metoprolol succinate ER (TOPROL XL) 50 MG 24 hr tablet Take 1 tablet by mouth daily      montelukast (SINGULAIR) 10 MG tablet Take 10 mg by mouth      sildenafil (REVATIO) 20 MG tablet Take 1 tab on empty stomach one hour prior to sexual activity; may increase to a max of 5 tabs      timolol maleate (TIMOPTIC) 0.5 % ophthalmic solution Apply 1 drop to eye      triamcinolone (KENALOG) 0.1 % external cream Apply topically 2 times daily 453.6 g 0    folic acid (FOLVITE) 1 MG tablet Take 1 mg by mouth (Patient not taking: Reported on 6/7/2024)         Allergies  Allergies   Allergen Reactions    Benazepril Other (See Comments)     Critical    Penicillin V Rash       Review of systems  A complete ROS was performed and was negative except as mentioned  "in HPI    Physical Exam  Vitals:    24 1057   BP: 129/69   BP Location: Right arm   Patient Position: Sitting   Cuff Size: Adult Regular   Pulse: 85   Resp: 16   SpO2: 99%   Weight: 93.4 kg (206 lb)       Wt Readings from Last 3 Encounters:   24 93.4 kg (206 lb)   24 93.4 kg (206 lb)   24 98.4 kg (217 lb)       ECO-2   male sitting in chair in NAD  HEET: NC/AT, EOMI w/ PERRL, anicteric sclera. MMM. No mouth sores.   Neck: supple no JVP  Lymph: No cervical, supraclavicular, axillary or inguinal LAD  CV: normal S1,S2 with RRR no m/r/g  Resp: lungs CTA bilaterally with adequate air movement. No wheezes or crackles  Abd: soft, NTND, no organomegaly or masses. BS normoactive.   Ext: WWP no edema or cyanosis  Skin: no concerning lesions or petechiae. Mild rash on the right side of his scalp.   Neuro: A&Ox4, no lateralizing sx. Grossly nonfocal. Strength appears preserved.       Labs and Imaging    Sodium   Date Value Ref Range Status   2024 139 135 - 145 mmol/L Final     Comment:     Reference intervals for this test were updated on 2023 to more accurately reflect our healthy population. There may be differences in the flagging of prior results with similar values performed with this method. Interpretation of those prior results can be made in the context of the updated reference intervals.     ,   Potassium   Date Value Ref Range Status   2024 4.0 3.4 - 5.3 mmol/L Final      Creatinine   Date Value Ref Range Status   2024 0.91 0.67 - 1.17 mg/dL Final       No results found for: \"LDH\"      Uric Acid   Date Value Ref Range Status   2024 3.9 3.4 - 7.0 mg/dL Final       WBC Count   Date Value Ref Range Status   2024 2.3 (L) 4.0 - 11.0 10e3/uL Final   ,   Hemoglobin   Date Value Ref Range Status   2024 7.7 (L) 13.3 - 17.7 g/dL Final   ],   Platelet Count   Date Value Ref Range Status   2024 166 150 - 450 10e3/uL Final   ,    MCV   Date Value " Ref Range Status   06/07/2024 84 78 - 100 fL Final         EPO level was 1213 on 9/2023.        Bone marrow biopsy 7/19/2023  FINAL DIAGNOSIS   Peripheral blood, bone marrow aspirate, biopsy and clot sections ([id number]; 07/19/2023):     1. Myelodysplastic syndrome with isolated del(5q).     2. Two small monotypic B-cell populations (3% and 11% of total events) of uncertain significance, reportedly   detected by flow cytometric analysis. See comment.     COMMENT   The significance of the small monotypic B-cell populations identified by flow cytometric analysis is uncertain, as   diagnostic features of lymphoma are not seen by morphology or demonstrated by immunohistochemistry. These findings may   represent monoclonal B-cell lymphocytosis, although minimal bone marrow involvement by B-cell lymphoma cannot be   excluded. Clinical and radiographic correlation is recommended.  A lymph node or other extramedullary tissue   biopsy is suggested if lymphoma is clinically and/or radiographically suspected.   Cytogenetics 5q deletion.   NGS BCORL1 VAF 3%      Bone marrow 2/21/2024  Peripheral blood, bone marrow aspirate, touch imprint, core biopsy, and clot:     -  Pancytopenia.     -  Inadequate bone marrow aspirate, core biopsy and clot sections.      -  The flow cytometry ([id number]) of the bone marrow shows approximately 3% monotypic B-cells positive for CD5, CD19, CD20, CD23,  and lambda, approximately 14% monotypic B-cells positive for CD5 (variable), CD19, CD20 (dim to negative), CD23 (dim), CD38 (parital) and , with no definitive light chain expression, and no increased blast population identified.   The marrow aspirate is hemodilute. The clot sections show no marrow tissue. The biopsy core shows a tiny area (<5% core sections) with a cellular (~10%) bone marrow showing tri-lineage cells including clusters of small hypolobated megakaryocytes. The immunohistochemistry on the biopsy sections appears  to show increased megakaryocytes and no definitive diagnostic features of lymphoma involvement. The presence of increased small hypolobated megakaryocytes suggests persistent disease of patient's known MDS with 5q deletion. Chromosome studies show that although 20 metaphases are routinely examined, only 1 was identified and appeared normal.   The significance of the two populations of monotypic B-cells identified by flow cytometry is uncertain, as diagnostic features of lymphoma are not seen by morphology or immunohistochemistry in this inadequate marrow biopsy specimen. These findings may represent monoclonal B-cell lymphocytosis of undetermined significance or involvement of the peripheral blood by a B-cell lymphoma, although bone marrow involvement by B-cell lymphoma cannot be excluded.     Assessment and Plan  Mr. Willie Charles is a 64 year old male who is here for further evaluation and/or management of MDS.    Oncology:  MDS with 5q deletion  WHO Classification: MDS w low blasts and 5q deletion  Date of diagnosis: 7/19/2023  Revised IPSS score: 2.5 Low  Molecular IPSS: -0.42 Moderate low  Bone Marrow Blast %: 2  Cytogenetics: 5q del  Molecular: BCORL1 VAF 3%  Past Treatment: lenalidomide 5mg on 8/11/2023 to 9/2023 with addition of azacitidine since 8/28/2023 until 10/2023.   Current Treatment: Restarted lenalidomide 2.5mg twice a week for 3 weeks on 1 week off since 11/2023. Off since 3/12.     Had an admission 4/18 from 4/22 and stopped lenalidomide on 4/17. Completed 3 weeks on and 1 week of of lenalidomide and restarted on 6/1 for a new cycle.     I discussed the pathophysiology and natural history of MDS with Mr. Willie Charles today. We went over the current prognostic models and scoring systems that are used in clinical practice as well as the potential for disease evolution into acute myeloid leukemia. We went over the current FDA approved treatments for myelodysplastic syndromes and covered that  the only curative option is through an allogeneic hematopoietic cell transplantation. His disease is  Moderate low  risk and currently BMT is not recommended.     We discussed that MDS with 5 q. responds well to lenalidomide and that he is not received adequate amount of treatment with lenalidomide.  Given that he is tolerating lenalidomide at this very low minimal dose I recommended that he increase the lenalidomide to 2.5 mg 5 times a week for 3 weeks on and then 1 week off.  If he continues to tolerate this well we can continue increasing the dose to eventually reach 10 mg 3 weeks on and 1 week off.  He can possibly be started on other agents if his anemia is not responding to lenalidomide including agents like Luspatercept.    He had a recent admission for neutropenic fever s/p antibiotics without blood cultures showing anything.  He did have diffuse ST elevations concerning for stefan/pericarditis.  Course was complicated by A-fib with RVR.  Not sure if the troponin leak was secondary to the RVR.  Though there are rare cases of arrhythmias with lenalidomide doubt if this was related to it.    He has now tolerated a full cycle of lenalidomide 2.5mg daily for 3 weeks and 1 week off.There has been trend towards improvement in transfusion requirement. For this month we will keep his lenalidomide dosing at 2.5mg q day for 3 weeks on and 1 week off. I will rediscuss with him virually at the end of this month with labs prior to see if we need to increase the dose of lenalidomide.     #Elevated ferritin 6096  - STOP Deferasirox for now. He has very minimal elevated ferritin and without quantification of liver iron stores would not start a chelating agent. Especially deferasirox which can cause agranulocytosis and liver function issues and kidney issues. Also unclear if current rash is 2/2 deferasirox.   - will start a trial of triamcinolone cream as well to rash area.     Plan:  - c/w lenalidomide 2.5mg q day for 3 weeks  on and 1 wek off.   - RTC virtually on 6/28.  - c/w twice weekly labs and as needed transfusions at local oncologist.  - If ANC trends below 500 consistently for more than 1 week can start levofloxacin as prophylaxis.     Hematology:  Anemia/Thrombocytopenia:   Transfuse leukocyte reduced and irradiated blood products: 1 unit pRBC if hgb is 7.0-7.9, 2 units pRBCs if Hgb 6.0-6.9 g/dl, 1 unit platelets if PLT count is <20,000 or <50,000 with clinical bleeding.    Immunocompromised:  As a result of his disease and/or previous treatment. Mr. Willie Charles is immunocompromised. his last ANC is >500 and there is no need for prophylactic antibiotics at this time.     Cardiac:  Mr. Willie Charles has no history of heart disease.     Renal:  Creatinine results reviewed today. Mr. Willie Charles does not have any renal disease.    Hepatic:  Liver function tests reviewed today.    Psychosocial:  Mr. Willie Charles is coping well with his diagnosis.     All other questions and concerns were addressed and answered to Mr. Willie Charles's satisfaction.    Today, I spent a total of 40 minutes of face-to-face and non face-to-face time with Mr. Willie Charles. Time spent included review and discussion of diagnostic test results, patient counseling, and coordination of care.    The longitudinal plan of care for the diagnosis(es)/condition(s) as documented were addressed during this visit. Due to the added complexity in care, I will continue to support Willie in the subsequent management and with ongoing continuity of care.    Haroon Ellis MD    Division of Hematology, Oncology and Transplantation  [facility]  P: [phone]

## 2024-06-07 NOTE — LETTER
6/7/2024      Willie Charles  [address]  [facility] [address]      Dear Colleague,    Thank you for referring your patient, Willie Charles, to the [facility]. Please see a copy of my visit note below.    [facility]  HEMATOLOGY & ONCOLOGY  FOLLOW UP VISIT    PATIENT NAME: Willie Charles          MRN # [id number]  YOB: 1960  DATE OF VISIT:  Jun 7, 2024            REFERRING PROVIDER:   Mr. Willie Charles was seen at the request of Arely for further evaluation and/or management.       History of Presenting Illness  Mr. Willie Charles is a pleasant 63 year old male with a past medical history significant for hyperthyroisidism s/p radioactive iodine, HTN  and MDS dx in 7/2023 after he had fatigue for several months found to have bicytopenia with WBC 3.3 and hemoglobin 7.0 and platelets 308 that led to a bone marrow biopsy on 7/19/2023 that showed hypocellular marrow with 10 to 20% cellularity and 2% blasts with megakaryocytic hyperplasia with dysplasia.  Cytogenetics showed 5 q. deletion and NGS showed BCORL1 with a VAF of 3%.  He was started on lenalidomide 5 mg daily on 8/11/2023.  His anemia worsened after a few days and he was then started on concurrent azacitidine in addition to Revlimid on 8/28/2023.  As per records his counts trended down by 9/2023 and his Revlimid was discontinued.  He was continued on 2 more cycles of azacitidine until October 2023.  He did see Dr. Squires at [place] in 11/2023 and was recommended to restart Revlimid starting at 2.5 mg dosing every other day for 3 weeks on and 1 week off.  He was started on 2.5 mg 2 times a week  and has been tolerating that for the last 3 months.He has continued to require about 2-4 prbcs in a month.  Denies any fevers or chills or ongoing bleeding from anywhere.    He has now established with the [facility] clinic and has now been increased to lenalidomide 2.5mg daily for 3 weeks on 1 week off.      Subjective  Patient is here with his wife Monica and Son. Post recent discharge on 4/22 he was started back on lenalidomide and finished 3 weeks on and 1 week off of 2.5mg daily. Patient denies any major side effects from the lenalidomide including diarrhea, nausea.  He has developed a new rash on his right side of his scalp which is itchy. He has been continued on antibacterial as well as antifungal medications.     Past Medical History  No past medical history on file.    Family History  No family history on file.    Social History  Smoking: Never  Alcohol use: occasionally drinks alcohol  Status:   Children/grandchildren: Did not ask.   Occupation: Housing  , .     [employer]  Current Outpatient Medications  Current Outpatient Medications   Medication Sig Dispense Refill    acyclovir (ZOVIRAX) 400 MG tablet Take 400 mg by mouth 2 times daily      amLODIPine (NORVASC) 10 MG tablet Take 1 tablet by mouth daily      aspirin 81 MG EC tablet Take 81 mg by mouth      cyanocobalamin (VITAMIN B-12) 1000 MCG tablet Take 1,000 mcg by mouth      dexAMETHasone (DECADRON) 0.5 MG/5ML elixir Take 0.1 mg by mouth      fluticasone (FLONASE) 50 MCG/ACT nasal spray Spray 1 spray in nostril      levothyroxine (SYNTHROID/LEVOTHROID) 150 MCG tablet TAKE ONE TABLET BY MOUTH ONCE DAILY. DO NOT TAKE WITH IRON,ALUMINUM,MAGNESIUM,OR CALCIUM      loratadine (CLARITIN) 10 MG tablet Take 10 mg by mouth      metoprolol succinate ER (TOPROL XL) 50 MG 24 hr tablet Take 1 tablet by mouth daily      montelukast (SINGULAIR) 10 MG tablet Take 10 mg by mouth      sildenafil (REVATIO) 20 MG tablet Take 1 tab on empty stomach one hour prior to sexual activity; may increase to a max of 5 tabs      timolol maleate (TIMOPTIC) 0.5 % ophthalmic solution Apply 1 drop to eye      triamcinolone (KENALOG) 0.1 % external cream Apply topically 2 times daily 453.6 g 0    folic acid (FOLVITE) 1 MG tablet Take 1 mg by mouth (Patient  "not taking: Reported on 2024)         Allergies  Allergies   Allergen Reactions    Benazepril Other (See Comments)     Critical    Penicillin V Rash       Review of systems  A complete ROS was performed and was negative except as mentioned in HPI    Physical Exam  Vitals:    24 1057   BP: 129/69   BP Location: Right arm   Patient Position: Sitting   Cuff Size: Adult Regular   Pulse: 85   Resp: 16   SpO2: 99%   Weight: 93.4 kg (206 lb)       Wt Readings from Last 3 Encounters:   24 93.4 kg (206 lb)   24 93.4 kg (206 lb)   24 98.4 kg (217 lb)       ECO-2   male sitting in chair in NAD  HEET: NC/AT, EOMI w/ PERRL, anicteric sclera. MMM. No mouth sores.   Neck: supple no JVP  Lymph: No cervical, supraclavicular, axillary or inguinal LAD  CV: normal S1,S2 with RRR no m/r/g  Resp: lungs CTA bilaterally with adequate air movement. No wheezes or crackles  Abd: soft, NTND, no organomegaly or masses. BS normoactive.   Ext: WWP no edema or cyanosis  Skin: no concerning lesions or petechiae. Mild rash on the right side of his scalp.   Neuro: A&Ox4, no lateralizing sx. Grossly nonfocal. Strength appears preserved.       Labs and Imaging    Sodium   Date Value Ref Range Status   2024 139 135 - 145 mmol/L Final     Comment:     Reference intervals for this test were updated on 2023 to more accurately reflect our healthy population. There may be differences in the flagging of prior results with similar values performed with this method. Interpretation of those prior results can be made in the context of the updated reference intervals.     ,   Potassium   Date Value Ref Range Status   2024 4.0 3.4 - 5.3 mmol/L Final      Creatinine   Date Value Ref Range Status   2024 0.91 0.67 - 1.17 mg/dL Final       No results found for: \"LDH\"      Uric Acid   Date Value Ref Range Status   2024 3.9 3.4 - 7.0 mg/dL Final       WBC Count   Date Value Ref Range Status "   06/07/2024 2.3 (L) 4.0 - 11.0 10e3/uL Final   ,   Hemoglobin   Date Value Ref Range Status   06/07/2024 7.7 (L) 13.3 - 17.7 g/dL Final   ],   Platelet Count   Date Value Ref Range Status   06/07/2024 166 150 - 450 10e3/uL Final   ,    MCV   Date Value Ref Range Status   06/07/2024 84 78 - 100 fL Final         EPO level was 1213 on 9/2023.        Bone marrow biopsy 7/19/2023  FINAL DIAGNOSIS   Peripheral blood, bone marrow aspirate, biopsy and clot sections ([id number]; 07/19/2023):     1. Myelodysplastic syndrome with isolated del(5q).     2. Two small monotypic B-cell populations (3% and 11% of total events) of uncertain significance, reportedly   detected by flow cytometric analysis. See comment.     COMMENT   The significance of the small monotypic B-cell populations identified by flow cytometric analysis is uncertain, as   diagnostic features of lymphoma are not seen by morphology or demonstrated by immunohistochemistry. These findings may   represent monoclonal B-cell lymphocytosis, although minimal bone marrow involvement by B-cell lymphoma cannot be   excluded. Clinical and radiographic correlation is recommended.  A lymph node or other extramedullary tissue   biopsy is suggested if lymphoma is clinically and/or radiographically suspected.   Cytogenetics 5q deletion.   NGS BCORL1 VAF 3%      Bone marrow 2/21/2024  Peripheral blood, bone marrow aspirate, touch imprint, core biopsy, and clot:     -  Pancytopenia.     -  Inadequate bone marrow aspirate, core biopsy and clot sections.      -  The flow cytometry ([id number]) of the bone marrow shows approximately 3% monotypic B-cells positive for CD5, CD19, CD20, CD23,  and lambda, approximately 14% monotypic B-cells positive for CD5 (variable), CD19, CD20 (dim to negative), CD23 (dim), CD38 (parital) and , with no definitive light chain expression, and no increased blast population identified.   The marrow aspirate is hemodilute. The clot  sections show no marrow tissue. The biopsy core shows a tiny area (<5% core sections) with a cellular (~10%) bone marrow showing tri-lineage cells including clusters of small hypolobated megakaryocytes. The immunohistochemistry on the biopsy sections appears to show increased megakaryocytes and no definitive diagnostic features of lymphoma involvement. The presence of increased small hypolobated megakaryocytes suggests persistent disease of patient's known MDS with 5q deletion. Chromosome studies show that although 20 metaphases are routinely examined, only 1 was identified and appeared normal.   The significance of the two populations of monotypic B-cells identified by flow cytometry is uncertain, as diagnostic features of lymphoma are not seen by morphology or immunohistochemistry in this inadequate marrow biopsy specimen. These findings may represent monoclonal B-cell lymphocytosis of undetermined significance or involvement of the peripheral blood by a B-cell lymphoma, although bone marrow involvement by B-cell lymphoma cannot be excluded.     Assessment and Plan  Mr. Willie Charles is a 64 year old male who is here for further evaluation and/or management of MDS.    Oncology:  MDS with 5q deletion  WHO Classification: MDS w low blasts and 5q deletion  Date of diagnosis: 7/19/2023  Revised IPSS score: 2.5 Low  Molecular IPSS: -0.42 Moderate low  Bone Marrow Blast %: 2  Cytogenetics: 5q del  Molecular: BCORL1 VAF 3%  Past Treatment: lenalidomide 5mg on 8/11/2023 to 9/2023 with addition of azacitidine since 8/28/2023 until 10/2023.   Current Treatment: Restarted lenalidomide 2.5mg twice a week for 3 weeks on 1 week off since 11/2023. Off since 3/12.     Had an admission 4/18 from 4/22 and stopped lenalidomide on 4/17. Completed 3 weeks on and 1 week of of lenalidomide and restarted on 6/1 for a new cycle.     I discussed the pathophysiology and natural history of MDS with Mr. Willie Charles today. We went  over the current prognostic models and scoring systems that are used in clinical practice as well as the potential for disease evolution into acute myeloid leukemia. We went over the current FDA approved treatments for myelodysplastic syndromes and covered that the only curative option is through an allogeneic hematopoietic cell transplantation. His disease is  Moderate low  risk and currently BMT is not recommended.     We discussed that MDS with 5 q. responds well to lenalidomide and that he is not received adequate amount of treatment with lenalidomide.  Given that he is tolerating lenalidomide at this very low minimal dose I recommended that he increase the lenalidomide to 2.5 mg 5 times a week for 3 weeks on and then 1 week off.  If he continues to tolerate this well we can continue increasing the dose to eventually reach 10 mg 3 weeks on and 1 week off.  He can possibly be started on other agents if his anemia is not responding to lenalidomide including agents like Luspatercept.    He had a recent admission for neutropenic fever s/p antibiotics without blood cultures showing anything.  He did have diffuse ST elevations concerning for stefan/pericarditis.  Course was complicated by A-fib with RVR.  Not sure if the troponin leak was secondary to the RVR.  Though there are rare cases of arrhythmias with lenalidomide doubt if this was related to it.    He has now tolerated a full cycle of lenalidomide 2.5mg daily for 3 weeks and 1 week off.There has been trend towards improvement in transfusion requirement. For this month we will keep his lenalidomide dosing at 2.5mg q day for 3 weeks on and 1 week off. I will rediscuss with him virually at the end of this month with labs prior to see if we need to increase the dose of lenalidomide.     #Elevated ferritin 4866  - STOP Deferasirox for now. He has very minimal elevated ferritin and without quantification of liver iron stores would not start a chelating agent.  Especially deferasirox which can cause agranulocytosis and liver function issues and kidney issues. Also unclear if current rash is 2/2 deferasirox.   - will start a trial of triamcinolone cream as well to rash area.     Plan:  - c/w lenalidomide 2.5mg q day for 3 weeks on and 1 wek off.   - RTC virtually on 6/28.  - c/w twice weekly labs and as needed transfusions at local oncologist.  - If ANC trends below 500 consistently for more than 1 week can start levofloxacin as prophylaxis.     Hematology:  Anemia/Thrombocytopenia:   Transfuse leukocyte reduced and irradiated blood products: 1 unit pRBC if hgb is 7.0-7.9, 2 units pRBCs if Hgb 6.0-6.9 g/dl, 1 unit platelets if PLT count is <20,000 or <50,000 with clinical bleeding.    Immunocompromised:  As a result of his disease and/or previous treatment. Mr. Willie Charles is immunocompromised. his last ANC is >500 and there is no need for prophylactic antibiotics at this time.     Cardiac:  Mr. Willie Charles has no history of heart disease.     Renal:  Creatinine results reviewed today. Mr. Willie Charles does not have any renal disease.    Hepatic:  Liver function tests reviewed today.    Psychosocial:  Mr. Willie Charles is coping well with his diagnosis.     All other questions and concerns were addressed and answered to Mr. Willie Charles's satisfaction.    Today, I spent a total of 40 minutes of face-to-face and non face-to-face time with Mr. Willie Charles. Time spent included review and discussion of diagnostic test results, patient counseling, and coordination of care.    The longitudinal plan of care for the diagnosis(es)/condition(s) as documented were addressed during this visit. Due to the added complexity in care, I will continue to support Willie in the subsequent management and with ongoing continuity of care.    Haroon Ellis MD    Division of Hematology, Oncology and Transplantation  [facility]  P:  [phone]

## 2024-06-07 NOTE — NURSING NOTE
Chief Complaint   Patient presents with    Blood Draw     Labs collected from venipuncture by RN. Vitals taken. Checked in for appointment(s).       Labs collected from venipuncture by RN. Vitals taken. Checked in for appointment(s).    Maureen Garza RN

## 2024-06-07 NOTE — NURSING NOTE
"Oncology Rooming Note    June 7, 2024 11:01 AM   Willie Charles is a 64 year old male who presents for:    Chief Complaint   Patient presents with    Oncology Clinic Visit     Myelodysplastic syndrome     Initial Vitals: /69 (BP Location: Right arm, Patient Position: Sitting, Cuff Size: Adult Regular)   Pulse 85   Resp 16   Wt 93.4 kg (206 lb)   SpO2 99%   BMI 31.32 kg/m   Estimated body mass index is 31.32 kg/m  as calculated from the following:    Height as of 4/26/24: 1.727 m (5' 8\").    Weight as of this encounter: 93.4 kg (206 lb). Body surface area is 2.12 meters squared.  No Pain (0) Comment: Data Unavailable   No LMP for male patient.  Allergies reviewed: Yes  Medications reviewed: Yes    Medications: Medication refills not needed today.  Pharmacy name entered into [employer]: [id number] - [place], [place] - [address]    Frailty Screening:   Is the patient here for a new oncology consult visit in cancer care? 2. No      Clinical concerns: Pt has rash on both sides of head.         Malka Phipps CMA              "

## 2024-06-28 ENCOUNTER — VIRTUAL VISIT (OUTPATIENT)
Dept: ONCOLOGY | Facility: CLINIC | Age: 64
End: 2024-06-28
Attending: STUDENT IN AN ORGANIZED HEALTH CARE EDUCATION/TRAINING PROGRAM
Payer: COMMERCIAL

## 2024-06-28 VITALS
TEMPERATURE: 97 F | BODY MASS INDEX: 30.16 KG/M2 | WEIGHT: 199 LBS | HEART RATE: 74 BPM | SYSTOLIC BLOOD PRESSURE: 138 MMHG | HEIGHT: 68 IN | OXYGEN SATURATION: 97 % | DIASTOLIC BLOOD PRESSURE: 66 MMHG

## 2024-06-28 DIAGNOSIS — K59.00 CONSTIPATION, UNSPECIFIED CONSTIPATION TYPE: Primary | ICD-10-CM

## 2024-06-28 PROCEDURE — 99215 OFFICE O/P EST HI 40 MIN: CPT | Mod: 95 | Performed by: STUDENT IN AN ORGANIZED HEALTH CARE EDUCATION/TRAINING PROGRAM

## 2024-06-28 PROCEDURE — G2211 COMPLEX E/M VISIT ADD ON: HCPCS | Mod: 95 | Performed by: STUDENT IN AN ORGANIZED HEALTH CARE EDUCATION/TRAINING PROGRAM

## 2024-06-28 RX ORDER — AMOXICILLIN 250 MG
2 CAPSULE ORAL DAILY PRN
Qty: 60 TABLET | Refills: 2 | Status: SHIPPED | OUTPATIENT
Start: 2024-06-28

## 2024-06-28 RX ORDER — METFORMIN HCL 500 MG
500 TABLET, EXTENDED RELEASE 24 HR ORAL
COMMUNITY
Start: 2024-04-11 | End: 2025-04-16

## 2024-06-28 RX ORDER — LENALIDOMIDE 5 MG/1
5 CAPSULE ORAL DAILY
COMMUNITY
Start: 2024-06-24

## 2024-06-28 RX ORDER — DILTIAZEM HYDROCHLORIDE 180 MG/1
180 CAPSULE, COATED, EXTENDED RELEASE ORAL DAILY
COMMUNITY
Start: 2024-04-23 | End: 2025-04-28

## 2024-06-28 ASSESSMENT — PAIN SCALES - GENERAL: PAINLEVEL: NO PAIN (0)

## 2024-06-28 NOTE — PROGRESS NOTES
Virtual Visit Details    Type of service:  Video Visit   Video Start Time:  3:35  Video End Time: 3:55    Originating Location (pt. Location): Home    Distant Location (provider location):  On-site  Platform used for Video Visit: [employer]

## 2024-06-28 NOTE — PROGRESS NOTES
[facility]  HEMATOLOGY & ONCOLOGY  FOLLOW UP VISIT    PATIENT NAME: Willie Charles          MRN # [id number]  YOB: 1960  DATE OF VISIT:  Jun 28, 2024            REFERRING PROVIDER:   Mr. Willie Charles was seen at the request of Arely for further evaluation and/or management.     Video Visit    History of Presenting Illness  Mr. Willie Charles is a pleasant 63 year old male with a past medical history significant for hyperthyroisidism s/p radioactive iodine, HTN  and MDS dx in 7/2023 after he had fatigue for several months found to have bicytopenia with WBC 3.3 and hemoglobin 7.0 and platelets 308 that led to a bone marrow biopsy on 7/19/2023 that showed hypocellular marrow with 10 to 20% cellularity and 2% blasts with megakaryocytic hyperplasia with dysplasia.  Cytogenetics showed 5 q. deletion and NGS showed BCORL1 with a VAF of 3%.  He was started on lenalidomide 5 mg daily on 8/11/2023.  His anemia worsened after a few days and he was then started on concurrent azacitidine in addition to Revlimid on 8/28/2023.  As per records his counts trended down by 9/2023 and his Revlimid was discontinued.  He was continued on 2 more cycles of azacitidine until October 2023.  He did see Dr. Squires at [facility] in 11/2023 and was recommended to restart Revlimid starting at 2.5 mg dosing every other day for 3 weeks on and 1 week off.  He was started on 2.5 mg 2 times a week  and has been tolerating that for the last 3 months.He has continued to require about 2-4 prbcs in a month.  Denies any fevers or chills or ongoing bleeding from anywhere.    He has now established with the [facility] and has now been increased to lenalidomide 2.5mg daily for 3 weeks on 1 week off.     Subjective  Patient is here with his wife Monica and Son. He has now finished  lenalidomide 2.5mg daily for 3 weeks on and 1 week off . Patient denies any major side effects from the lenalidomide. His previous rash disappeared  since he stopped the deferasirox. .He has been continued on antibacterial as well as antifungal medications.     Past Medical History  History reviewed. No pertinent past medical history.    Family History  History reviewed. No pertinent family history.    Social History  Smoking: Never  Alcohol use: occasionally drinks alcohol  Status:   Children/grandchildren: Did not ask.   Occupation: Housing  , .     [employer]  Current Outpatient Medications  Current Outpatient Medications   Medication Sig Dispense Refill    acyclovir (ZOVIRAX) 400 MG tablet Take 400 mg by mouth 2 times daily      amLODIPine (NORVASC) 10 MG tablet Take 1 tablet by mouth daily      aspirin 81 MG EC tablet Take 81 mg by mouth      cyanocobalamin (VITAMIN B-12) 1000 MCG tablet Take 1,000 mcg by mouth      dexAMETHasone (DECADRON) 0.5 MG/5ML elixir Take 0.1 mg by mouth      diltiazem ER COATED BEADS (CARDIZEM CD/CARTIA XT) 180 MG 24 hr capsule Take 180 mg by mouth daily      fluticasone (FLONASE) 50 MCG/ACT nasal spray Spray 1 spray in nostril      levothyroxine (SYNTHROID/LEVOTHROID) 150 MCG tablet TAKE ONE TABLET BY MOUTH ONCE DAILY. DO NOT TAKE WITH IRON,ALUMINUM,MAGNESIUM,OR CALCIUM      loratadine (CLARITIN) 10 MG tablet Take 10 mg by mouth      metFORMIN (GLUCOPHAGE XR) 500 MG 24 hr tablet Take 500 mg by mouth daily (with dinner)      metoprolol succinate ER (TOPROL XL) 50 MG 24 hr tablet Take 1 tablet by mouth daily      montelukast (SINGULAIR) 10 MG tablet Take 10 mg by mouth      REVLIMID 5 MG CAPS capsule Take 5 mg by mouth daily      senna-docusate (SENNA S) 8.6-50 MG tablet Take 2 tablets by mouth daily as needed for constipation 60 tablet 2    timolol maleate (TIMOPTIC) 0.5 % ophthalmic solution Apply 1 drop to eye      folic acid (FOLVITE) 1 MG tablet Take 1 mg by mouth      sildenafil (REVATIO) 20 MG tablet Take 1 tab on empty stomach one hour prior to sexual activity; may increase to a max of 5 tabs       "triamcinolone (KENALOG) 0.1 % external cream Apply topically 2 times daily 453.6 g 0       Allergies  Allergies   Allergen Reactions    Benazepril Other (See Comments)     Critical    Penicillin V Rash       Review of systems  A complete ROS was performed and was negative except as mentioned in HPI    Physical Exam  Vitals:    06/28/24 1506   BP: 138/66   Pulse: 74   Temp: 97  F (36.1  C)   SpO2: 97%   Weight: 90.3 kg (199 lb)   Height: 1.727 m (5' 8\")       Wt Readings from Last 3 Encounters:   06/28/24 90.3 kg (199 lb)   06/07/24 93.4 kg (206 lb)   06/07/24 93.4 kg (206 lb)       Limited exam 2/2 virtual visit.   -Sitting comfortably in the chair, breathing well without using any accessory muscles.      Labs and Imaging    Sodium   Date Value Ref Range Status   06/07/2024 139 135 - 145 mmol/L Final     Comment:     Reference intervals for this test were updated on 09/26/2023 to more accurately reflect our healthy population. There may be differences in the flagging of prior results with similar values performed with this method. Interpretation of those prior results can be made in the context of the updated reference intervals.     ,   Potassium   Date Value Ref Range Status   06/07/2024 4.0 3.4 - 5.3 mmol/L Final      Creatinine   Date Value Ref Range Status   06/07/2024 0.91 0.67 - 1.17 mg/dL Final       No results found for: \"LDH\"      Uric Acid   Date Value Ref Range Status   06/07/2024 3.9 3.4 - 7.0 mg/dL Final       WBC Count   Date Value Ref Range Status   06/07/2024 2.3 (L) 4.0 - 11.0 10e3/uL Final   ,   Hemoglobin   Date Value Ref Range Status   06/07/2024 7.7 (L) 13.3 - 17.7 g/dL Final   ],   Platelet Count   Date Value Ref Range Status   06/07/2024 166 150 - 450 10e3/uL Final   ,    MCV   Date Value Ref Range Status   06/07/2024 84 78 - 100 fL Final         EPO level was 1213 on 9/2023.        Bone marrow biopsy 7/19/2023  FINAL DIAGNOSIS   Peripheral blood, bone marrow aspirate, biopsy and clot " sections ([id number]; 07/19/2023):     1. Myelodysplastic syndrome with isolated del(5q).     2. Two small monotypic B-cell populations (3% and 11% of total events) of uncertain significance, reportedly   detected by flow cytometric analysis. See comment.     COMMENT   The significance of the small monotypic B-cell populations identified by flow cytometric analysis is uncertain, as   diagnostic features of lymphoma are not seen by morphology or demonstrated by immunohistochemistry. These findings may   represent monoclonal B-cell lymphocytosis, although minimal bone marrow involvement by B-cell lymphoma cannot be   excluded. Clinical and radiographic correlation is recommended.  A lymph node or other extramedullary tissue   biopsy is suggested if lymphoma is clinically and/or radiographically suspected.   Cytogenetics 5q deletion.   NGS BCORL1 VAF 3%      Bone marrow 2/21/2024  Peripheral blood, bone marrow aspirate, touch imprint, core biopsy, and clot:     -  Pancytopenia.     -  Inadequate bone marrow aspirate, core biopsy and clot sections.      -  The flow cytometry ([id number]) of the bone marrow shows approximately 3% monotypic B-cells positive for CD5, CD19, CD20, CD23,  and lambda, approximately 14% monotypic B-cells positive for CD5 (variable), CD19, CD20 (dim to negative), CD23 (dim), CD38 (parital) and , with no definitive light chain expression, and no increased blast population identified.   The marrow aspirate is hemodilute. The clot sections show no marrow tissue. The biopsy core shows a tiny area (<5% core sections) with a cellular (~10%) bone marrow showing tri-lineage cells including clusters of small hypolobated megakaryocytes. The immunohistochemistry on the biopsy sections appears to show increased megakaryocytes and no definitive diagnostic features of lymphoma involvement. The presence of increased small hypolobated megakaryocytes suggests persistent disease of patient's known MDS  with 5q deletion. Chromosome studies show that although 20 metaphases are routinely examined, only 1 was identified and appeared normal.   The significance of the two populations of monotypic B-cells identified by flow cytometry is uncertain, as diagnostic features of lymphoma are not seen by morphology or immunohistochemistry in this inadequate marrow biopsy specimen. These findings may represent monoclonal B-cell lymphocytosis of undetermined significance or involvement of the peripheral blood by a B-cell lymphoma, although bone marrow involvement by B-cell lymphoma cannot be excluded.     Assessment and Plan  Mr. Willie Charles is a 64 year old male who is here for further evaluation and/or management of MDS.    Oncology:  MDS with 5q deletion  WHO Classification: MDS w low blasts and 5q deletion  Date of diagnosis: 7/19/2023  Revised IPSS score: 2.5 Low  Molecular IPSS: -0.42 Moderate low  Bone Marrow Blast %: 2  Cytogenetics: 5q del  Molecular: BCORL1 VAF 3%  Past Treatment: lenalidomide 5mg on 8/11/2023 to 9/2023 with addition of azacitidine since 8/28/2023 until 10/2023.   Current Treatment: Restarted lenalidomide 2.5mg twice a week for 3 weeks on 1 week off since 11/2023. Off since 3/12.     Had an admission 4/18 from 4/22 and stopped lenalidomide on 4/17. Completed 3 weeks on and 1 week of of lenalidomide and restarted on 6/1 for a new cycle.     I discussed the pathophysiology and natural history of MDS with Mr. Willie Charles today. We went over the current prognostic models and scoring systems that are used in clinical practice as well as the potential for disease evolution into acute myeloid leukemia. We went over the current FDA approved treatments for myelodysplastic syndromes and covered that the only curative option is through an allogeneic hematopoietic cell transplantation. His disease is  Moderate low  risk and currently BMT is not recommended.     We discussed that MDS with 5 q. responds  well to lenalidomide and that he is not received adequate amount of treatment with lenalidomide.  Given that he is tolerating lenalidomide at this very low minimal dose I recommended that he increase the lenalidomide to 2.5 mg 5 times a week for 3 weeks on and then 1 week off.  If he continues to tolerate this well we can continue increasing the dose to eventually reach 10 mg 3 weeks on and 1 week off.  He can possibly be started on other agents if his anemia is not responding to lenalidomide including agents like Luspatercept.    He had a recent admission for neutropenic fever s/p antibiotics without blood cultures showing anything.  He did have diffuse ST elevations concerning for stefan/pericarditis.  Course was complicated by A-fib with RVR.  Not sure if the troponin leak was secondary to the RVR.  Though there are rare cases of arrhythmias with lenalidomide doubt if this was related to it.    He has now tolerated a full cycle of lenalidomide 2.5mg daily for 3 weeks and 1 week off.  We will increase the lenalidomide to 5mg for his next cycle. His Hgb was noted to be 7.2 and is due for a transfusion. He will delay lenalidomide until he gets a transfusion.     #Elevated ferritin 1676  - STOPPED Deferasirox.   - He has very minimal elevated ferritin and without quantification of liver iron stores would not start a chelating agent. Especially deferasirox which can cause agranulocytosis and liver function issues and kidney issues.   - His rash has now resolved since he stopped the deferasirix.     Plan:  - c/w lenalidomide 5mg q day for 3 weeks on and 1 wek off.   - RTC with me on 7/26 with labs prior.   - c/w twice weekly labs and as needed transfusions at local oncologist.  - If ANC trends below 500 consistently for more than 1 week can start levofloxacin as prophylaxis.     Hematology:  Anemia/Thrombocytopenia:   Transfuse leukocyte reduced and irradiated blood products: 1 unit pRBC if hgb is 7.0-7.9, 2 units pRBCs  if Hgb 6.0-6.9 g/dl, 1 unit platelets if PLT count is <20,000 or <50,000 with clinical bleeding.    Immunocompromised:  As a result of his disease and/or previous treatment. Mr. Willie Charles is immunocompromised. his last ANC is >500 and there is no need for prophylactic antibiotics at this time.     Cardiac:  Mr. Willie Charles has no history of heart disease.     Renal:  Creatinine results reviewed today. Mr. Willie Charles does not have any renal disease.    Hepatic:  Liver function tests reviewed today.    Psychosocial:  Mr. Willie Charles is coping well with his diagnosis.     All other questions and concerns were addressed and answered to Mr. Willie Charles's satisfaction.    Today, I spent a total of 40 minutes of face-to-face and non face-to-face time with Mr. Willie Charles. Time spent included review and discussion of diagnostic test results, patient counseling, and coordination of care.    The longitudinal plan of care for the diagnosis(es)/condition(s) as documented were addressed during this visit. Due to the added complexity in care, I will continue to support Willie in the subsequent management and with ongoing continuity of care.    Haroon Ellis MD    Division of Hematology, Oncology and Transplantation  [facility]  P: [phone]

## 2024-06-28 NOTE — NURSING NOTE
Is the patient currently in the state of [place]? YES    Visit mode:VIDEO    If the visit is dropped, the patient can be reconnected by: VIDEO VISIT: Text to cell phone:   Telephone Information:   Mobile [phone]       Will anyone else be joining the visit? NO  (If patient encounters technical issues they should call [phone])    How would you like to obtain your AVS? MyChart    Are changes needed to the allergy or medication list? No    Are refills needed on medications prescribed by this physician? No    Reason for visit: RECHECK    No other vitals to report per pt    Ama NAVARRETEF

## 2024-06-28 NOTE — LETTER
6/28/2024      Willie Charles  [address]  [place] [address]      Dear Colleague,    Thank you for referring your patient, Willie Charles, to the [facility]. Please see a copy of my visit note below.    Virtual Visit Details    Type of service:  Video Visit   Video Start Time:  3:35  Video End Time: 3:55    Originating Location (pt. Location): Home    Distant Location (provider location):  On-site  Platform used for Video Visit: [facility]  HEMATOLOGY & ONCOLOGY  FOLLOW UP VISIT    PATIENT NAME: Willie Charles          MRN # [id number]  YOB: 1960  DATE OF VISIT:  Jun 28, 2024            REFERRING PROVIDER:   Mr. Willie Charles was seen at the request of Arely for further evaluation and/or management.     Video Visit    History of Presenting Illness  Mr. Willie Charles is a pleasant 63 year old male with a past medical history significant for hyperthyroisidism s/p radioactive iodine, HTN  and MDS dx in 7/2023 after he had fatigue for several months found to have bicytopenia with WBC 3.3 and hemoglobin 7.0 and platelets 308 that led to a bone marrow biopsy on 7/19/2023 that showed hypocellular marrow with 10 to 20% cellularity and 2% blasts with megakaryocytic hyperplasia with dysplasia.  Cytogenetics showed 5 q. deletion and NGS showed BCORL1 with a VAF of 3%.  He was started on lenalidomide 5 mg daily on 8/11/2023.  His anemia worsened after a few days and he was then started on concurrent azacitidine in addition to Revlimid on 8/28/2023.  As per records his counts trended down by 9/2023 and his Revlimid was discontinued.  He was continued on 2 more cycles of azacitidine until October 2023.  He did see Dr. Squires at [place] in 11/2023 and was recommended to restart Revlimid starting at 2.5 mg dosing every other day for 3 weeks on and 1 week off.  He was started on 2.5 mg 2 times a week  and has been tolerating that for the last 3  months.He has continued to require about 2-4 prbcs in a month.  Denies any fevers or chills or ongoing bleeding from anywhere.    He has now established with the [facility] and has now been increased to lenalidomide 2.5mg daily for 3 weeks on 1 week off.     Subjective  Patient is here with his wife Monica and Son. He has now finished  lenalidomide 2.5mg daily for 3 weeks on and 1 week off . Patient denies any major side effects from the lenalidomide. His previous rash disappeared since he stopped the deferasirox. .He has been continued on antibacterial as well as antifungal medications.     Past Medical History  History reviewed. No pertinent past medical history.    Family History  History reviewed. No pertinent family history.    Social History  Smoking: Never  Alcohol use: occasionally drinks alcohol  Status:   Children/grandchildren: Did not ask.   Occupation: Housing  , .     [employer]  Current Outpatient Medications  Current Outpatient Medications   Medication Sig Dispense Refill     acyclovir (ZOVIRAX) 400 MG tablet Take 400 mg by mouth 2 times daily       amLODIPine (NORVASC) 10 MG tablet Take 1 tablet by mouth daily       aspirin 81 MG EC tablet Take 81 mg by mouth       cyanocobalamin (VITAMIN B-12) 1000 MCG tablet Take 1,000 mcg by mouth       dexAMETHasone (DECADRON) 0.5 MG/5ML elixir Take 0.1 mg by mouth       diltiazem ER COATED BEADS (CARDIZEM CD/CARTIA XT) 180 MG 24 hr capsule Take 180 mg by mouth daily       fluticasone (FLONASE) 50 MCG/ACT nasal spray Spray 1 spray in nostril       levothyroxine (SYNTHROID/LEVOTHROID) 150 MCG tablet TAKE ONE TABLET BY MOUTH ONCE DAILY. DO NOT TAKE WITH IRON,ALUMINUM,MAGNESIUM,OR CALCIUM       loratadine (CLARITIN) 10 MG tablet Take 10 mg by mouth       metFORMIN (GLUCOPHAGE XR) 500 MG 24 hr tablet Take 500 mg by mouth daily (with dinner)       metoprolol succinate ER (TOPROL XL) 50 MG 24 hr tablet Take 1 tablet by mouth daily        "montelukast (SINGULAIR) 10 MG tablet Take 10 mg by mouth       REVLIMID 5 MG CAPS capsule Take 5 mg by mouth daily       senna-docusate (SENNA S) 8.6-50 MG tablet Take 2 tablets by mouth daily as needed for constipation 60 tablet 2     timolol maleate (TIMOPTIC) 0.5 % ophthalmic solution Apply 1 drop to eye       folic acid (FOLVITE) 1 MG tablet Take 1 mg by mouth       sildenafil (REVATIO) 20 MG tablet Take 1 tab on empty stomach one hour prior to sexual activity; may increase to a max of 5 tabs       triamcinolone (KENALOG) 0.1 % external cream Apply topically 2 times daily 453.6 g 0       Allergies  Allergies   Allergen Reactions     Benazepril Other (See Comments)     Critical     Penicillin V Rash       Review of systems  A complete ROS was performed and was negative except as mentioned in HPI    Physical Exam  Vitals:    06/28/24 1506   BP: 138/66   Pulse: 74   Temp: 97  F (36.1  C)   SpO2: 97%   Weight: 90.3 kg (199 lb)   Height: 1.727 m (5' 8\")       Wt Readings from Last 3 Encounters:   06/28/24 90.3 kg (199 lb)   06/07/24 93.4 kg (206 lb)   06/07/24 93.4 kg (206 lb)       Limited exam 2/2 virtual visit.   -Sitting comfortably in the chair, breathing well without using any accessory muscles.      Labs and Imaging    Sodium   Date Value Ref Range Status   06/07/2024 139 135 - 145 mmol/L Final     Comment:     Reference intervals for this test were updated on 09/26/2023 to more accurately reflect our healthy population. There may be differences in the flagging of prior results with similar values performed with this method. Interpretation of those prior results can be made in the context of the updated reference intervals.     ,   Potassium   Date Value Ref Range Status   06/07/2024 4.0 3.4 - 5.3 mmol/L Final      Creatinine   Date Value Ref Range Status   06/07/2024 0.91 0.67 - 1.17 mg/dL Final       No results found for: \"LDH\"      Uric Acid   Date Value Ref Range Status   06/07/2024 3.9 3.4 - 7.0 mg/dL " Final       WBC Count   Date Value Ref Range Status   06/07/2024 2.3 (L) 4.0 - 11.0 10e3/uL Final   ,   Hemoglobin   Date Value Ref Range Status   06/07/2024 7.7 (L) 13.3 - 17.7 g/dL Final   ],   Platelet Count   Date Value Ref Range Status   06/07/2024 166 150 - 450 10e3/uL Final   ,    MCV   Date Value Ref Range Status   06/07/2024 84 78 - 100 fL Final         EPO level was 1213 on 9/2023.        Bone marrow biopsy 7/19/2023  FINAL DIAGNOSIS   Peripheral blood, bone marrow aspirate, biopsy and clot sections ([id number]; 07/19/2023):     1. Myelodysplastic syndrome with isolated del(5q).     2. Two small monotypic B-cell populations (3% and 11% of total events) of uncertain significance, reportedly   detected by flow cytometric analysis. See comment.     COMMENT   The significance of the small monotypic B-cell populations identified by flow cytometric analysis is uncertain, as   diagnostic features of lymphoma are not seen by morphology or demonstrated by immunohistochemistry. These findings may   represent monoclonal B-cell lymphocytosis, although minimal bone marrow involvement by B-cell lymphoma cannot be   excluded. Clinical and radiographic correlation is recommended.  A lymph node or other extramedullary tissue   biopsy is suggested if lymphoma is clinically and/or radiographically suspected.   Cytogenetics 5q deletion.   NGS BCORL1 VAF 3%      Bone marrow 2/21/2024  Peripheral blood, bone marrow aspirate, touch imprint, core biopsy, and clot:     -  Pancytopenia.     -  Inadequate bone marrow aspirate, core biopsy and clot sections.      -  The flow cytometry ([id number]) of the bone marrow shows approximately 3% monotypic B-cells positive for CD5, CD19, CD20, CD23,  and lambda, approximately 14% monotypic B-cells positive for CD5 (variable), CD19, CD20 (dim to negative), CD23 (dim), CD38 (parital) and , with no definitive light chain expression, and no increased blast population  identified.   The marrow aspirate is hemodilute. The clot sections show no marrow tissue. The biopsy core shows a tiny area (<5% core sections) with a cellular (~10%) bone marrow showing tri-lineage cells including clusters of small hypolobated megakaryocytes. The immunohistochemistry on the biopsy sections appears to show increased megakaryocytes and no definitive diagnostic features of lymphoma involvement. The presence of increased small hypolobated megakaryocytes suggests persistent disease of patient's known MDS with 5q deletion. Chromosome studies show that although 20 metaphases are routinely examined, only 1 was identified and appeared normal.   The significance of the two populations of monotypic B-cells identified by flow cytometry is uncertain, as diagnostic features of lymphoma are not seen by morphology or immunohistochemistry in this inadequate marrow biopsy specimen. These findings may represent monoclonal B-cell lymphocytosis of undetermined significance or involvement of the peripheral blood by a B-cell lymphoma, although bone marrow involvement by B-cell lymphoma cannot be excluded.     Assessment and Plan  Mr. Willie Charles is a 64 year old male who is here for further evaluation and/or management of MDS.    Oncology:  MDS with 5q deletion  WHO Classification: MDS w low blasts and 5q deletion  Date of diagnosis: 7/19/2023  Revised IPSS score: 2.5 Low  Molecular IPSS: -0.42 Moderate low  Bone Marrow Blast %: 2  Cytogenetics: 5q del  Molecular: BCORL1 VAF 3%  Past Treatment: lenalidomide 5mg on 8/11/2023 to 9/2023 with addition of azacitidine since 8/28/2023 until 10/2023.   Current Treatment: Restarted lenalidomide 2.5mg twice a week for 3 weeks on 1 week off since 11/2023. Off since 3/12.     Had an admission 4/18 from 4/22 and stopped lenalidomide on 4/17. Completed 3 weeks on and 1 week of of lenalidomide and restarted on 6/1 for a new cycle.     I discussed the pathophysiology and natural  history of MDS with Mr. Willie Charles today. We went over the current prognostic models and scoring systems that are used in clinical practice as well as the potential for disease evolution into acute myeloid leukemia. We went over the current FDA approved treatments for myelodysplastic syndromes and covered that the only curative option is through an allogeneic hematopoietic cell transplantation. His disease is  Moderate low  risk and currently BMT is not recommended.     We discussed that MDS with 5 q. responds well to lenalidomide and that he is not received adequate amount of treatment with lenalidomide.  Given that he is tolerating lenalidomide at this very low minimal dose I recommended that he increase the lenalidomide to 2.5 mg 5 times a week for 3 weeks on and then 1 week off.  If he continues to tolerate this well we can continue increasing the dose to eventually reach 10 mg 3 weeks on and 1 week off.  He can possibly be started on other agents if his anemia is not responding to lenalidomide including agents like Luspatercept.    He had a recent admission for neutropenic fever s/p antibiotics without blood cultures showing anything.  He did have diffuse ST elevations concerning for stefan/pericarditis.  Course was complicated by A-fib with RVR.  Not sure if the troponin leak was secondary to the RVR.  Though there are rare cases of arrhythmias with lenalidomide doubt if this was related to it.    He has now tolerated a full cycle of lenalidomide 2.5mg daily for 3 weeks and 1 week off.  We will increase the lenalidomide to 5mg for his next cycle. His Hgb was noted to be 7.2 and is due for a transfusion. He will delay lenalidomide until he gets a transfusion.     #Elevated ferritin 6636  - STOPPED Deferasirox.   - He has very minimal elevated ferritin and without quantification of liver iron stores would not start a chelating agent. Especially deferasirox which can cause agranulocytosis and liver function  issues and kidney issues.   - His rash has now resolved since he stopped the deferasirix.     Plan:  - c/w lenalidomide 5mg q day for 3 weeks on and 1 wek off.   - RTC with me on 7/26 with labs prior.   - c/w twice weekly labs and as needed transfusions at local oncologist.  - If ANC trends below 500 consistently for more than 1 week can start levofloxacin as prophylaxis.     Hematology:  Anemia/Thrombocytopenia:   Transfuse leukocyte reduced and irradiated blood products: 1 unit pRBC if hgb is 7.0-7.9, 2 units pRBCs if Hgb 6.0-6.9 g/dl, 1 unit platelets if PLT count is <20,000 or <50,000 with clinical bleeding.    Immunocompromised:  As a result of his disease and/or previous treatment. Mr. Willie Charles is immunocompromised. his last ANC is >500 and there is no need for prophylactic antibiotics at this time.     Cardiac:  Mr. Willie Charles has no history of heart disease.     Renal:  Creatinine results reviewed today. Mr. Willie Charles does not have any renal disease.    Hepatic:  Liver function tests reviewed today.    Psychosocial:  Mr. Willie Charles is coping well with his diagnosis.     All other questions and concerns were addressed and answered to Mr. Willie Charles's satisfaction.    Today, I spent a total of 40 minutes of face-to-face and non face-to-face time with Mr. Willie Charles. Time spent included review and discussion of diagnostic test results, patient counseling, and coordination of care.    The longitudinal plan of care for the diagnosis(es)/condition(s) as documented were addressed during this visit. Due to the added complexity in care, I will continue to support Willie in the subsequent management and with ongoing continuity of care.    Haroon Ellis MD    Division of Hematology, Oncology and Transplantation  [facility]  P: [phone]      Again, thank you for allowing me to participate in the care of your patient.         Sincerely,        Haroon Ellis MD

## 2024-07-26 ENCOUNTER — ONCOLOGY VISIT (OUTPATIENT)
Dept: ONCOLOGY | Facility: CLINIC | Age: 64
End: 2024-07-26
Attending: STUDENT IN AN ORGANIZED HEALTH CARE EDUCATION/TRAINING PROGRAM
Payer: COMMERCIAL

## 2024-07-26 ENCOUNTER — APPOINTMENT (OUTPATIENT)
Dept: LAB | Facility: CLINIC | Age: 64
End: 2024-07-26
Attending: STUDENT IN AN ORGANIZED HEALTH CARE EDUCATION/TRAINING PROGRAM
Payer: COMMERCIAL

## 2024-07-26 VITALS
OXYGEN SATURATION: 98 % | SYSTOLIC BLOOD PRESSURE: 149 MMHG | WEIGHT: 200.5 LBS | HEART RATE: 71 BPM | RESPIRATION RATE: 18 BRPM | BODY MASS INDEX: 30.49 KG/M2 | DIASTOLIC BLOOD PRESSURE: 68 MMHG | TEMPERATURE: 98 F

## 2024-07-26 DIAGNOSIS — D46.9 MDS (MYELODYSPLASTIC SYNDROME) (H): ICD-10-CM

## 2024-07-26 LAB
ALBUMIN SERPL BCG-MCNC: 4 G/DL (ref 3.5–5.2)
ALP SERPL-CCNC: 88 U/L (ref 40–150)
ALT SERPL W P-5'-P-CCNC: 82 U/L (ref 0–70)
ANION GAP SERPL CALCULATED.3IONS-SCNC: 9 MMOL/L (ref 7–15)
AST SERPL W P-5'-P-CCNC: 34 U/L (ref 0–45)
BASOPHILS # BLD AUTO: ABNORMAL 10*3/UL
BASOPHILS # BLD MANUAL: 0 10E3/UL (ref 0–0.2)
BASOPHILS NFR BLD AUTO: ABNORMAL %
BASOPHILS NFR BLD MANUAL: 2 %
BILIRUB SERPL-MCNC: 0.3 MG/DL
BUN SERPL-MCNC: 17.2 MG/DL (ref 8–23)
CALCIUM SERPL-MCNC: 8.8 MG/DL (ref 8.8–10.4)
CHLORIDE SERPL-SCNC: 107 MMOL/L (ref 98–107)
CREAT SERPL-MCNC: 0.93 MG/DL (ref 0.67–1.17)
EGFRCR SERPLBLD CKD-EPI 2021: >90 ML/MIN/1.73M2
EOSINOPHIL # BLD AUTO: ABNORMAL 10*3/UL
EOSINOPHIL # BLD MANUAL: 0 10E3/UL (ref 0–0.7)
EOSINOPHIL NFR BLD AUTO: ABNORMAL %
EOSINOPHIL NFR BLD MANUAL: 2 %
ERYTHROCYTE [DISTWIDTH] IN BLOOD BY AUTOMATED COUNT: 13.2 % (ref 10–15)
GLUCOSE SERPL-MCNC: 130 MG/DL (ref 70–99)
HCO3 SERPL-SCNC: 24 MMOL/L (ref 22–29)
HCT VFR BLD AUTO: 21.1 % (ref 40–53)
HGB BLD-MCNC: 7.1 G/DL (ref 13.3–17.7)
IMM GRANULOCYTES # BLD: ABNORMAL 10*3/UL
IMM GRANULOCYTES NFR BLD: ABNORMAL %
LYMPHOCYTES # BLD AUTO: ABNORMAL 10*3/UL
LYMPHOCYTES # BLD MANUAL: 1.2 10E3/UL (ref 0.8–5.3)
LYMPHOCYTES NFR BLD AUTO: ABNORMAL %
LYMPHOCYTES NFR BLD MANUAL: 60 %
MCH RBC QN AUTO: 28.5 PG (ref 26.5–33)
MCHC RBC AUTO-ENTMCNC: 33.6 G/DL (ref 31.5–36.5)
MCV RBC AUTO: 85 FL (ref 78–100)
MONOCYTES # BLD AUTO: ABNORMAL 10*3/UL
MONOCYTES # BLD MANUAL: 0.1 10E3/UL (ref 0–1.3)
MONOCYTES NFR BLD AUTO: ABNORMAL %
MONOCYTES NFR BLD MANUAL: 5 %
NEUTROPHILS # BLD AUTO: ABNORMAL 10*3/UL
NEUTROPHILS # BLD MANUAL: 0.6 10E3/UL (ref 1.6–8.3)
NEUTROPHILS NFR BLD AUTO: ABNORMAL %
NEUTROPHILS NFR BLD MANUAL: 31 %
NRBC # BLD AUTO: 0 10E3/UL
NRBC BLD AUTO-RTO: 0 /100
PLAT MORPH BLD: ABNORMAL
PLATELET # BLD AUTO: 113 10E3/UL (ref 150–450)
POTASSIUM SERPL-SCNC: 4.3 MMOL/L (ref 3.4–5.3)
PROT SERPL-MCNC: 6.3 G/DL (ref 6.4–8.3)
RBC # BLD AUTO: 2.49 10E6/UL (ref 4.4–5.9)
RBC MORPH BLD: ABNORMAL
SODIUM SERPL-SCNC: 140 MMOL/L (ref 135–145)
URATE SERPL-MCNC: 4.1 MG/DL (ref 3.4–7)
WBC # BLD AUTO: 2 10E3/UL (ref 4–11)

## 2024-07-26 PROCEDURE — 80053 COMPREHEN METABOLIC PANEL: CPT | Performed by: STUDENT IN AN ORGANIZED HEALTH CARE EDUCATION/TRAINING PROGRAM

## 2024-07-26 PROCEDURE — G0463 HOSPITAL OUTPT CLINIC VISIT: HCPCS | Performed by: STUDENT IN AN ORGANIZED HEALTH CARE EDUCATION/TRAINING PROGRAM

## 2024-07-26 PROCEDURE — 84550 ASSAY OF BLOOD/URIC ACID: CPT | Performed by: STUDENT IN AN ORGANIZED HEALTH CARE EDUCATION/TRAINING PROGRAM

## 2024-07-26 PROCEDURE — 85041 AUTOMATED RBC COUNT: CPT | Performed by: STUDENT IN AN ORGANIZED HEALTH CARE EDUCATION/TRAINING PROGRAM

## 2024-07-26 PROCEDURE — 85007 BL SMEAR W/DIFF WBC COUNT: CPT | Performed by: STUDENT IN AN ORGANIZED HEALTH CARE EDUCATION/TRAINING PROGRAM

## 2024-07-26 PROCEDURE — 36415 COLL VENOUS BLD VENIPUNCTURE: CPT | Performed by: STUDENT IN AN ORGANIZED HEALTH CARE EDUCATION/TRAINING PROGRAM

## 2024-07-26 PROCEDURE — 99215 OFFICE O/P EST HI 40 MIN: CPT | Performed by: STUDENT IN AN ORGANIZED HEALTH CARE EDUCATION/TRAINING PROGRAM

## 2024-07-26 PROCEDURE — G2211 COMPLEX E/M VISIT ADD ON: HCPCS | Performed by: STUDENT IN AN ORGANIZED HEALTH CARE EDUCATION/TRAINING PROGRAM

## 2024-07-26 RX ORDER — ONDANSETRON 8 MG/1
8 TABLET, FILM COATED ORAL EVERY 8 HOURS PRN
COMMUNITY
Start: 2023-09-27

## 2024-07-26 RX ORDER — PROCHLORPERAZINE MALEATE 10 MG
10 TABLET ORAL EVERY 4 HOURS PRN
COMMUNITY
Start: 2023-08-28

## 2024-07-26 RX ORDER — ROSUVASTATIN CALCIUM 10 MG/1
10 TABLET, COATED ORAL DAILY
COMMUNITY
Start: 2024-06-18 | End: 2025-06-23

## 2024-07-26 ASSESSMENT — PAIN SCALES - GENERAL: PAINLEVEL: NO PAIN (0)

## 2024-07-26 NOTE — NURSING NOTE
Chief Complaint   Patient presents with    Blood Draw     Labs drawn via  by RN in lab. VS taken.      Labs collected from venipuncture by RN. Vital signs taken. Checked in for appointment(s).    Naomi Alexandra RN

## 2024-07-26 NOTE — LETTER
7/26/2024      Willie Charles  [address]  [facility] [address]      Dear Colleague,    Thank you for referring your patient, Willie Charles, to the [facility]. Please see a copy of my visit note below.    [facility]  HEMATOLOGY & ONCOLOGY  FOLLOW UP VISIT    PATIENT NAME: Willie Charles          MRN # [id number]  YOB: 1960  DATE OF VISIT:  Jul 26, 2024            REFERRING PROVIDER:   Mr. Willie Charles was seen at the request of Arely for further evaluation and/or management.     Video Visit    History of Presenting Illness  Mr. Willie Charles is a pleasant 63 year old male with a past medical history significant for hyperthyroisidism s/p radioactive iodine, HTN  and MDS dx in 7/2023 after he had fatigue for several months found to have bicytopenia with WBC 3.3 and hemoglobin 7.0 and platelets 308 that led to a bone marrow biopsy on 7/19/2023 that showed hypocellular marrow with 10 to 20% cellularity and 2% blasts with megakaryocytic hyperplasia with dysplasia.  Cytogenetics showed 5 q. deletion and NGS showed BCORL1 with a VAF of 3%.  He was started on lenalidomide 5 mg daily on 8/11/2023.  His anemia worsened after a few days and he was then started on concurrent azacitidine in addition to Revlimid on 8/28/2023.  As per records his counts trended down by 9/2023 and his Revlimid was discontinued.  He was continued on 2 more cycles of azacitidine until October 2023.  He did see Dr. Squires at [place] in 11/2023 and was recommended to restart Revlimid starting at 2.5 mg dosing every other day for 3 weeks on and 1 week off.  He was started on 2.5 mg 2 times a week  and has been tolerating that for the last 3 months.He has continued to require about 2-4 prbcs in a month.  Denies any fevers or chills or ongoing bleeding from anywhere.    He has now established with the [facility] clinic and has now been increased to lenalidomide 5mg daily for 3 weeks on  1 week off.     Subjective  Patient is here with his wife Monica and Son. He has now finished  lenalidomide 5mg daily for 3 weeks on and 1 week off. Says that he has had more energy during the last month, however he did require 3-4 units prbc patient denies any major side effects from the lenalidomide.     Past Medical History  No past medical history on file.    Family History  No family history on file.    Social History  Smoking: Never  Alcohol use: occasionally drinks alcohol  Status:   Children/grandchildren: Did not ask.   Occupation: Housing  , .     [employer]  Current Outpatient Medications  Current Outpatient Medications   Medication Sig Dispense Refill     ondansetron (ZOFRAN) 8 MG tablet Take 8 mg by mouth every 8 hours as needed       prochlorperazine (COMPAZINE) 10 MG tablet Take 10 mg by mouth every 4 hours as needed       rosuvastatin (CRESTOR) 10 MG tablet Take 10 mg by mouth daily       acyclovir (ZOVIRAX) 400 MG tablet Take 400 mg by mouth 2 times daily       amLODIPine (NORVASC) 10 MG tablet Take 1 tablet by mouth daily       aspirin 81 MG EC tablet Take 81 mg by mouth daily       cyanocobalamin (VITAMIN B-12) 1000 MCG tablet Take 1,000 mcg by mouth daily       dexAMETHasone (DECADRON) 0.5 MG/5ML elixir Take 0.1 mg by mouth daily       diltiazem ER COATED BEADS (CARDIZEM CD/CARTIA XT) 180 MG 24 hr capsule Take 180 mg by mouth daily       fluticasone (FLONASE) 50 MCG/ACT nasal spray Spray 1 spray into both nostrils daily       folic acid (FOLVITE) 1 MG tablet Take 1 mg by mouth daily       levothyroxine (SYNTHROID/LEVOTHROID) 150 MCG tablet TAKE ONE TABLET BY MOUTH ONCE DAILY. DO NOT TAKE WITH IRON,ALUMINUM,MAGNESIUM,OR CALCIUM       loratadine (CLARITIN) 10 MG tablet Take 10 mg by mouth daily       metFORMIN (GLUCOPHAGE XR) 500 MG 24 hr tablet Take 500 mg by mouth daily (with dinner)       metoprolol succinate ER (TOPROL XL) 50 MG 24 hr tablet Take 1 tablet by mouth  daily       montelukast (SINGULAIR) 10 MG tablet Take 10 mg by mouth at bedtime       REVLIMID 5 MG CAPS capsule Take 5 mg by mouth daily       senna-docusate (SENNA S) 8.6-50 MG tablet Take 2 tablets by mouth daily as needed for constipation 60 tablet 2     sildenafil (REVATIO) 20 MG tablet Take 1 tab on empty stomach one hour prior to sexual activity; may increase to a max of 5 tabs       timolol maleate (TIMOPTIC) 0.5 % ophthalmic solution Place 1 drop into both eyes 2 times daily       triamcinolone (KENALOG) 0.1 % external cream Apply topically 2 times daily 453.6 g 0       Allergies  Allergies   Allergen Reactions     Benazepril Other (See Comments)     Critical     Penicillin V Rash       Review of systems  A complete ROS was performed and was negative except as mentioned in HPI    Physical Exam  Vitals:    24 1245   BP: (!) 149/68   Pulse: 71   Resp: 18   Temp: 98  F (36.7  C)   TempSrc: Oral   SpO2: 98%   Weight: 90.9 kg (200 lb 8 oz)       Wt Readings from Last 3 Encounters:   24 90.9 kg (200 lb 8 oz)   24 90.3 kg (199 lb)   24 93.4 kg (206 lb)       ECO   male sitting in chair in NAD  HEET: NC/AT, EOMI w/ PERRL, anicteric sclera. MMM. No mouth sores.   Neck: supple no JVP  CV: normal S1,S2 with RRR no m/r/g  Resp: good air movement b/l. No wheezes or crackles  Abd: soft, NTND, no organomegaly or masses. BS normoactive.   Ext: WWP no edema or cyanosis  Neuro: A&Ox4, no lateralizing sx. Grossly nonfocal. Strength appears preserved.   Lymph: No cervical, supraclavicular, axillary or inguinal LAD  Skin: no concerning lesions or rashes or petechiae        Labs and Imaging    Sodium   Date Value Ref Range Status   2024 139 135 - 145 mmol/L Final     Comment:     Reference intervals for this test were updated on 2023 to more accurately reflect our healthy population. There may be differences in the flagging of prior results with similar values performed with this  "method. Interpretation of those prior results can be made in the context of the updated reference intervals.     ,   Potassium   Date Value Ref Range Status   06/07/2024 4.0 3.4 - 5.3 mmol/L Final      Creatinine   Date Value Ref Range Status   06/07/2024 0.91 0.67 - 1.17 mg/dL Final       No results found for: \"LDH\"      Uric Acid   Date Value Ref Range Status   06/07/2024 3.9 3.4 - 7.0 mg/dL Final       WBC Count   Date Value Ref Range Status   07/26/2024 2.0 (L) 4.0 - 11.0 10e3/uL Preliminary   ,   Hemoglobin   Date Value Ref Range Status   07/26/2024 7.1 (L) 13.3 - 17.7 g/dL Preliminary   ],   Platelet Count   Date Value Ref Range Status   07/26/2024 113 (L) 150 - 450 10e3/uL Preliminary   ,    MCV   Date Value Ref Range Status   07/26/2024 85 78 - 100 fL Preliminary         EPO level was 1213 on 9/2023.        Bone marrow biopsy 7/19/2023  FINAL DIAGNOSIS   Peripheral blood, bone marrow aspirate, biopsy and clot sections ([id number]; 07/19/2023):     1. Myelodysplastic syndrome with isolated del(5q).     2. Two small monotypic B-cell populations (3% and 11% of total events) of uncertain significance, reportedly   detected by flow cytometric analysis. See comment.     COMMENT   The significance of the small monotypic B-cell populations identified by flow cytometric analysis is uncertain, as   diagnostic features of lymphoma are not seen by morphology or demonstrated by immunohistochemistry. These findings may   represent monoclonal B-cell lymphocytosis, although minimal bone marrow involvement by B-cell lymphoma cannot be   excluded. Clinical and radiographic correlation is recommended.  A lymph node or other extramedullary tissue   biopsy is suggested if lymphoma is clinically and/or radiographically suspected.   Cytogenetics 5q deletion.   NGS BCORL1 VAF 3%      Bone marrow 2/21/2024  Peripheral blood, bone marrow aspirate, touch imprint, core biopsy, and clot:     -  Pancytopenia.     -  Inadequate bone " marrow aspirate, core biopsy and clot sections.      -  The flow cytometry ([id number]) of the bone marrow shows approximately 3% monotypic B-cells positive for CD5, CD19, CD20, CD23,  and lambda, approximately 14% monotypic B-cells positive for CD5 (variable), CD19, CD20 (dim to negative), CD23 (dim), CD38 (parital) and , with no definitive light chain expression, and no increased blast population identified.   The marrow aspirate is hemodilute. The clot sections show no marrow tissue. The biopsy core shows a tiny area (<5% core sections) with a cellular (~10%) bone marrow showing tri-lineage cells including clusters of small hypolobated megakaryocytes. The immunohistochemistry on the biopsy sections appears to show increased megakaryocytes and no definitive diagnostic features of lymphoma involvement. The presence of increased small hypolobated megakaryocytes suggests persistent disease of patient's known MDS with 5q deletion. Chromosome studies show that although 20 metaphases are routinely examined, only 1 was identified and appeared normal.   The significance of the two populations of monotypic B-cells identified by flow cytometry is uncertain, as diagnostic features of lymphoma are not seen by morphology or immunohistochemistry in this inadequate marrow biopsy specimen. These findings may represent monoclonal B-cell lymphocytosis of undetermined significance or involvement of the peripheral blood by a B-cell lymphoma, although bone marrow involvement by B-cell lymphoma cannot be excluded.     Assessment and Plan  Mr. Willie Charles is a 64 year old male who is here for further evaluation and/or management of MDS.    Oncology:  MDS with 5q deletion  WHO Classification: MDS w low blasts and 5q deletion  Date of diagnosis: 7/19/2023  Revised IPSS score: 2.5 Low  Molecular IPSS: -0.42 Moderate low  Bone Marrow Blast %: 2  Cytogenetics: 5q del  Molecular: BCORL1 VAF 3%  Past Treatment: lenalidomide  5mg on 8/11/2023 to 9/2023 with addition of azacitidine since 8/28/2023 until 10/2023.   Current Treatment: Restarted lenalidomide 2.5mg twice a week for 3 weeks on 1 week off since 11/2023. Off since 3/12.     Had an admission 4/18 from 4/22 and stopped lenalidomide on 4/17. Completed 3 weeks on and 1 week of of lenalidomide and restarted on 6/1 for a new cycle.     I discussed the pathophysiology and natural history of MDS with Mr. Willie MACE Maiagladys today. We went over the current prognostic models and scoring systems that are used in clinical practice as well as the potential for disease evolution into acute myeloid leukemia. We went over the current FDA approved treatments for myelodysplastic syndromes and covered that the only curative option is through an allogeneic hematopoietic cell transplantation. His disease is  Moderate low  risk and currently BMT is not recommended.     We discussed that MDS with 5 q. responds well to lenalidomide and that he is not received adequate amount of treatment with lenalidomide.  Given that he is tolerating lenalidomide at this very low minimal dose I recommended that he increase the lenalidomide to 2.5 mg 5 times a week for 3 weeks on and then 1 week off.  If he continues to tolerate this well we can continue increasing the dose to eventually reach 10 mg 3 weeks on and 1 week off.  He can possibly be started on other agents if his anemia is not responding to lenalidomide including agents like Luspatercept.    He had a recent admission for neutropenic fever s/p antibiotics without blood cultures showing anything.  He did have diffuse ST elevations concerning for stefan/pericarditis.  Course was complicated by A-fib with RVR.  Not sure if the troponin leak was secondary to the RVR.  Though there are rare cases of arrhythmias with lenalidomide doubt if this was related to it.    He has now tolerated a full cycle of lenalidomide 2.5mg daily for 3 weeks and 1 week off.  We increased  his lenalidomide to 5mg he has tolerated this well with minimal rash that disappeared on its own without any treatment. For this cycle we will continue with 5 mg for 3 weeks on and 1 week off.  Next cycle we will plan for full dose of 10 mg 3 weeks on and 1 week off if he is tolerating well without any major cytopenias or infections or other side effects.    His ANC has been slowly improving and he has not had any infections. If ANC continues to be below 0.5 for more than 7 days and he continues to have recurrent infections then we can consider prophylactic antibiotics.     We will plan to pursue a bone marrow biopsy after 2 cycles of 10mg lenalidomide if no response is seen.     #Elevated ferritin 1676  - STOPPED Deferasirox.   - He has very minimal elevated ferritin and without quantification of liver iron stores would not start a chelating agent. Especially deferasirox which can cause agranulocytosis and liver function issues and kidney issues.   - His rash has now resolved since he stopped the deferasirix.     Plan:  - c/w lenalidomide 5mg q day for 3 weeks on and 1 wek off. Plan for 10mg lenalidomide for subsequent cycle on 8/26.   - RTC with me on 8/20 virtually.  - c/w twice weekly labs and as needed transfusions at local oncologist.  - If ANC trends below 500 consistently for more than 1 week can start levofloxacin as prophylaxis.     Hematology:  Anemia/Thrombocytopenia:   Transfuse leukocyte reduced and irradiated blood products: 1 unit pRBC if hgb is 7.0-7.9, 2 units pRBCs if Hgb 6.0-6.9 g/dl, 1 unit platelets if PLT count is <20,000 or <50,000 with clinical bleeding.    Immunocompromised:  As a result of his disease and/or previous treatment. Mr. Willie Charles is immunocompromised. his last ANC is >500 and there is no need for prophylactic antibiotics at this time.     Cardiac:  Mr. Willie Charles has no history of heart disease.     Renal:  Creatinine results reviewed today. Mr. Willie Chrales  does not have any renal disease.    Hepatic:  Liver function tests reviewed today.    Psychosocial:  Mr. Willie Charles is coping well with his diagnosis.     All other questions and concerns were addressed and answered to Mr. Willie Charles's satisfaction.    Today, I spent a total of 40 minutes of face-to-face and non face-to-face time with Mr. Willie Charles. Time spent included review and discussion of diagnostic test results, patient counseling, and coordination of care.    The longitudinal plan of care for the diagnosis(es)/condition(s) as documented were addressed during this visit. Due to the added complexity in care, I will continue to support Willie in the subsequent management and with ongoing continuity of care.    Haroon Ellis MD    Division of Hematology, Oncology and Transplantation  [facility]  P: [phone]      Again, thank you for allowing me to participate in the care of your patient.        Sincerely,        Haroon Ellis MD

## 2024-07-26 NOTE — PROGRESS NOTES
[facility]  HEMATOLOGY & ONCOLOGY  FOLLOW UP VISIT    PATIENT NAME: Willie Charles          MRN # [id number]  YOB: 1960  DATE OF VISIT:  Jul 26, 2024            REFERRING PROVIDER:   Mr. Willie Charles was seen at the request of Arely for further evaluation and/or management.     Video Visit    History of Presenting Illness  Mr. Willie Charles is a pleasant 63 year old male with a past medical history significant for hyperthyroisidism s/p radioactive iodine, HTN  and MDS dx in 7/2023 after he had fatigue for several months found to have bicytopenia with WBC 3.3 and hemoglobin 7.0 and platelets 308 that led to a bone marrow biopsy on 7/19/2023 that showed hypocellular marrow with 10 to 20% cellularity and 2% blasts with megakaryocytic hyperplasia with dysplasia.  Cytogenetics showed 5 q. deletion and NGS showed BCORL1 with a VAF of 3%.  He was started on lenalidomide 5 mg daily on 8/11/2023.  His anemia worsened after a few days and he was then started on concurrent azacitidine in addition to Revlimid on 8/28/2023.  As per records his counts trended down by 9/2023 and his Revlimid was discontinued.  He was continued on 2 more cycles of azacitidine until October 2023.  He did see Dr. Squires at [facility] in 11/2023 and was recommended to restart Revlimid starting at 2.5 mg dosing every other day for 3 weeks on and 1 week off.  He was started on 2.5 mg 2 times a week  and has been tolerating that for the last 3 months.He has continued to require about 2-4 prbcs in a month.  Denies any fevers or chills or ongoing bleeding from anywhere.    He has now established with the [facility] and has now been increased to lenalidomide 5mg daily for 3 weeks on 1 week off.     Subjective  Patient is here with his wife Monica and Son. He has now finished  lenalidomide 5mg daily for 3 weeks on and 1 week off. Says that he has had more energy during the last month, however he did require 3-4 units prbc  patient denies any major side effects from the lenalidomide.     Past Medical History  No past medical history on file.    Family History  No family history on file.    Social History  Smoking: Never  Alcohol use: occasionally drinks alcohol  Status:   Children/grandchildren: Did not ask.   Occupation: Housing  , .     [employer]  Current Outpatient Medications  Current Outpatient Medications   Medication Sig Dispense Refill    ondansetron (ZOFRAN) 8 MG tablet Take 8 mg by mouth every 8 hours as needed      prochlorperazine (COMPAZINE) 10 MG tablet Take 10 mg by mouth every 4 hours as needed      rosuvastatin (CRESTOR) 10 MG tablet Take 10 mg by mouth daily      acyclovir (ZOVIRAX) 400 MG tablet Take 400 mg by mouth 2 times daily      amLODIPine (NORVASC) 10 MG tablet Take 1 tablet by mouth daily      aspirin 81 MG EC tablet Take 81 mg by mouth daily      cyanocobalamin (VITAMIN B-12) 1000 MCG tablet Take 1,000 mcg by mouth daily      dexAMETHasone (DECADRON) 0.5 MG/5ML elixir Take 0.1 mg by mouth daily      diltiazem ER COATED BEADS (CARDIZEM CD/CARTIA XT) 180 MG 24 hr capsule Take 180 mg by mouth daily      fluticasone (FLONASE) 50 MCG/ACT nasal spray Spray 1 spray into both nostrils daily      folic acid (FOLVITE) 1 MG tablet Take 1 mg by mouth daily      levothyroxine (SYNTHROID/LEVOTHROID) 150 MCG tablet TAKE ONE TABLET BY MOUTH ONCE DAILY. DO NOT TAKE WITH IRON,ALUMINUM,MAGNESIUM,OR CALCIUM      loratadine (CLARITIN) 10 MG tablet Take 10 mg by mouth daily      metFORMIN (GLUCOPHAGE XR) 500 MG 24 hr tablet Take 500 mg by mouth daily (with dinner)      metoprolol succinate ER (TOPROL XL) 50 MG 24 hr tablet Take 1 tablet by mouth daily      montelukast (SINGULAIR) 10 MG tablet Take 10 mg by mouth at bedtime      REVLIMID 5 MG CAPS capsule Take 5 mg by mouth daily      senna-docusate (SENNA S) 8.6-50 MG tablet Take 2 tablets by mouth daily as needed for constipation 60 tablet 2     sildenafil (REVATIO) 20 MG tablet Take 1 tab on empty stomach one hour prior to sexual activity; may increase to a max of 5 tabs      timolol maleate (TIMOPTIC) 0.5 % ophthalmic solution Place 1 drop into both eyes 2 times daily      triamcinolone (KENALOG) 0.1 % external cream Apply topically 2 times daily 453.6 g 0       Allergies  Allergies   Allergen Reactions    Benazepril Other (See Comments)     Critical    Penicillin V Rash       Review of systems  A complete ROS was performed and was negative except as mentioned in HPI    Physical Exam  Vitals:    24 1245   BP: (!) 149/68   Pulse: 71   Resp: 18   Temp: 98  F (36.7  C)   TempSrc: Oral   SpO2: 98%   Weight: 90.9 kg (200 lb 8 oz)       Wt Readings from Last 3 Encounters:   24 90.9 kg (200 lb 8 oz)   24 90.3 kg (199 lb)   24 93.4 kg (206 lb)       ECO   male sitting in chair in NAD  HEET: NC/AT, EOMI w/ PERRL, anicteric sclera. MMM. No mouth sores.   Neck: supple no JVP  CV: normal S1,S2 with RRR no m/r/g  Resp: good air movement b/l. No wheezes or crackles  Abd: soft, NTND, no organomegaly or masses. BS normoactive.   Ext: WWP no edema or cyanosis  Neuro: A&Ox4, no lateralizing sx. Grossly nonfocal. Strength appears preserved.   Lymph: No cervical, supraclavicular, axillary or inguinal LAD  Skin: no concerning lesions or rashes or petechiae        Labs and Imaging    Sodium   Date Value Ref Range Status   2024 139 135 - 145 mmol/L Final     Comment:     Reference intervals for this test were updated on 2023 to more accurately reflect our healthy population. There may be differences in the flagging of prior results with similar values performed with this method. Interpretation of those prior results can be made in the context of the updated reference intervals.     ,   Potassium   Date Value Ref Range Status   2024 4.0 3.4 - 5.3 mmol/L Final      Creatinine   Date Value Ref Range Status   2024 0.91  "0.67 - 1.17 mg/dL Final       No results found for: \"LDH\"      Uric Acid   Date Value Ref Range Status   06/07/2024 3.9 3.4 - 7.0 mg/dL Final       WBC Count   Date Value Ref Range Status   07/26/2024 2.0 (L) 4.0 - 11.0 10e3/uL Preliminary   ,   Hemoglobin   Date Value Ref Range Status   07/26/2024 7.1 (L) 13.3 - 17.7 g/dL Preliminary   ],   Platelet Count   Date Value Ref Range Status   07/26/2024 113 (L) 150 - 450 10e3/uL Preliminary   ,    MCV   Date Value Ref Range Status   07/26/2024 85 78 - 100 fL Preliminary         EPO level was 1213 on 9/2023.        Bone marrow biopsy 7/19/2023  FINAL DIAGNOSIS   Peripheral blood, bone marrow aspirate, biopsy and clot sections ([id number]; 07/19/2023):     1. Myelodysplastic syndrome with isolated del(5q).     2. Two small monotypic B-cell populations (3% and 11% of total events) of uncertain significance, reportedly   detected by flow cytometric analysis. See comment.     COMMENT   The significance of the small monotypic B-cell populations identified by flow cytometric analysis is uncertain, as   diagnostic features of lymphoma are not seen by morphology or demonstrated by immunohistochemistry. These findings may   represent monoclonal B-cell lymphocytosis, although minimal bone marrow involvement by B-cell lymphoma cannot be   excluded. Clinical and radiographic correlation is recommended.  A lymph node or other extramedullary tissue   biopsy is suggested if lymphoma is clinically and/or radiographically suspected.   Cytogenetics 5q deletion.   NGS BCORL1 VAF 3%      Bone marrow 2/21/2024  Peripheral blood, bone marrow aspirate, touch imprint, core biopsy, and clot:     -  Pancytopenia.     -  Inadequate bone marrow aspirate, core biopsy and clot sections.      -  The flow cytometry ([id number]) of the bone marrow shows approximately 3% monotypic B-cells positive for CD5, CD19, CD20, CD23,  and lambda, approximately 14% monotypic B-cells positive for CD5 " (variable), CD19, CD20 (dim to negative), CD23 (dim), CD38 (parital) and , with no definitive light chain expression, and no increased blast population identified.   The marrow aspirate is hemodilute. The clot sections show no marrow tissue. The biopsy core shows a tiny area (<5% core sections) with a cellular (~10%) bone marrow showing tri-lineage cells including clusters of small hypolobated megakaryocytes. The immunohistochemistry on the biopsy sections appears to show increased megakaryocytes and no definitive diagnostic features of lymphoma involvement. The presence of increased small hypolobated megakaryocytes suggests persistent disease of patient's known MDS with 5q deletion. Chromosome studies show that although 20 metaphases are routinely examined, only 1 was identified and appeared normal.   The significance of the two populations of monotypic B-cells identified by flow cytometry is uncertain, as diagnostic features of lymphoma are not seen by morphology or immunohistochemistry in this inadequate marrow biopsy specimen. These findings may represent monoclonal B-cell lymphocytosis of undetermined significance or involvement of the peripheral blood by a B-cell lymphoma, although bone marrow involvement by B-cell lymphoma cannot be excluded.     Assessment and Plan  Mr. Willie Charles is a 64 year old male who is here for further evaluation and/or management of MDS.    Oncology:  MDS with 5q deletion  WHO Classification: MDS w low blasts and 5q deletion  Date of diagnosis: 7/19/2023  Revised IPSS score: 2.5 Low  Molecular IPSS: -0.42 Moderate low  Bone Marrow Blast %: 2  Cytogenetics: 5q del  Molecular: BCORL1 VAF 3%  Past Treatment: lenalidomide 5mg on 8/11/2023 to 9/2023 with addition of azacitidine since 8/28/2023 until 10/2023.   Current Treatment: Restarted lenalidomide 2.5mg twice a week for 3 weeks on 1 week off since 11/2023. Off since 3/12.     Had an admission 4/18 from 4/22 and stopped  lenalidomide on 4/17. Completed 3 weeks on and 1 week of of lenalidomide and restarted on 6/1 for a new cycle.     I discussed the pathophysiology and natural history of MDS with Mr. Willie MACE Melvin today. We went over the current prognostic models and scoring systems that are used in clinical practice as well as the potential for disease evolution into acute myeloid leukemia. We went over the current FDA approved treatments for myelodysplastic syndromes and covered that the only curative option is through an allogeneic hematopoietic cell transplantation. His disease is  Moderate low  risk and currently BMT is not recommended.     We discussed that MDS with 5 q. responds well to lenalidomide and that he is not received adequate amount of treatment with lenalidomide.  Given that he is tolerating lenalidomide at this very low minimal dose I recommended that he increase the lenalidomide to 2.5 mg 5 times a week for 3 weeks on and then 1 week off.  If he continues to tolerate this well we can continue increasing the dose to eventually reach 10 mg 3 weeks on and 1 week off.  He can possibly be started on other agents if his anemia is not responding to lenalidomide including agents like Luspatercept.    He had a recent admission for neutropenic fever s/p antibiotics without blood cultures showing anything.  He did have diffuse ST elevations concerning for stefan/pericarditis.  Course was complicated by A-fib with RVR.  Not sure if the troponin leak was secondary to the RVR.  Though there are rare cases of arrhythmias with lenalidomide doubt if this was related to it.    He has now tolerated a full cycle of lenalidomide 2.5mg daily for 3 weeks and 1 week off.  We increased his lenalidomide to 5mg he has tolerated this well with minimal rash that disappeared on its own without any treatment. For this cycle we will continue with 5 mg for 3 weeks on and 1 week off.  Next cycle we will plan for full dose of 10 mg 3 weeks on and  1 week off if he is tolerating well without any major cytopenias or infections or other side effects.    His ANC has been slowly improving and he has not had any infections. If ANC continues to be below 0.5 for more than 7 days and he continues to have recurrent infections then we can consider prophylactic antibiotics.     We will plan to pursue a bone marrow biopsy after 2 cycles of 10mg lenalidomide if no response is seen.     #Elevated ferritin 1676  - STOPPED Deferasirox.   - He has very minimal elevated ferritin and without quantification of liver iron stores would not start a chelating agent. Especially deferasirox which can cause agranulocytosis and liver function issues and kidney issues.   - His rash has now resolved since he stopped the deferasirix.     Plan:  - c/w lenalidomide 5mg q day for 3 weeks on and 1 wek off. Plan for 10mg lenalidomide for subsequent cycle on 8/26.   - RTC with me on 8/20 virtually.  - c/w twice weekly labs and as needed transfusions at local oncologist.  - If ANC trends below 500 consistently for more than 1 week can start levofloxacin as prophylaxis.     Hematology:  Anemia/Thrombocytopenia:   Transfuse leukocyte reduced and irradiated blood products: 1 unit pRBC if hgb is 7.0-7.9, 2 units pRBCs if Hgb 6.0-6.9 g/dl, 1 unit platelets if PLT count is <20,000 or <50,000 with clinical bleeding.    Immunocompromised:  As a result of his disease and/or previous treatment. Mr. Willie Charles is immunocompromised. his last ANC is >500 and there is no need for prophylactic antibiotics at this time.     Cardiac:  Mr. Willie Charles has no history of heart disease.     Renal:  Creatinine results reviewed today. Mr. Willie Charles does not have any renal disease.    Hepatic:  Liver function tests reviewed today.    Psychosocial:  Mr. Willie Charles is coping well with his diagnosis.     All other questions and concerns were addressed and answered to Mr. Willie Charles's  satisfaction.    Today, I spent a total of 40 minutes of face-to-face and non face-to-face time with Mr. Willie NAKITA Charles. Time spent included review and discussion of diagnostic test results, patient counseling, and coordination of care.    The longitudinal plan of care for the diagnosis(es)/condition(s) as documented were addressed during this visit. Due to the added complexity in care, I will continue to support Willie in the subsequent management and with ongoing continuity of care.    Haroon Ellis MD    Division of Hematology, Oncology and Transplantation  [facility]  P: [phone]

## 2024-07-26 NOTE — NURSING NOTE
"Oncology Rooming Note    July 26, 2024 1:06 PM   Willie Charles is a 64 year old male who presents for:    Chief Complaint   Patient presents with    Blood Draw     Labs drawn via  by RN in lab. VS taken.     Oncology Clinic Visit     MDS     Initial Vitals: BP (!) 149/68   Pulse 71   Temp 98  F (36.7  C) (Oral)   Resp 18   Wt 90.9 kg (200 lb 8 oz)   SpO2 98%   BMI 30.49 kg/m   Estimated body mass index is 30.49 kg/m  as calculated from the following:    Height as of 6/28/24: 1.727 m (5' 8\").    Weight as of this encounter: 90.9 kg (200 lb 8 oz). Body surface area is 2.09 meters squared.  No Pain (0) Comment: Data Unavailable   No LMP for male patient.  Allergies reviewed: Yes  Medications reviewed: Yes    Medications: Medication refills not needed today.  Pharmacy name entered into [employer]: [facility] - [place], [place] - [address]    Frailty Screening:   Is the patient here for a new oncology consult visit in cancer care? 2. No      Clinical concerns: None       Karen Pemberton LPN  7/26/2024                "

## 2024-08-20 ENCOUNTER — VIRTUAL VISIT (OUTPATIENT)
Dept: ONCOLOGY | Facility: CLINIC | Age: 64
End: 2024-08-20
Attending: STUDENT IN AN ORGANIZED HEALTH CARE EDUCATION/TRAINING PROGRAM
Payer: COMMERCIAL

## 2024-08-20 VITALS — HEIGHT: 68 IN | WEIGHT: 200 LBS | BODY MASS INDEX: 30.31 KG/M2

## 2024-08-20 DIAGNOSIS — D46.9 MDS (MYELODYSPLASTIC SYNDROME) (H): Primary | ICD-10-CM

## 2024-08-20 PROCEDURE — 99215 OFFICE O/P EST HI 40 MIN: CPT | Mod: 95 | Performed by: STUDENT IN AN ORGANIZED HEALTH CARE EDUCATION/TRAINING PROGRAM

## 2024-08-20 PROCEDURE — G2211 COMPLEX E/M VISIT ADD ON: HCPCS | Mod: 95 | Performed by: STUDENT IN AN ORGANIZED HEALTH CARE EDUCATION/TRAINING PROGRAM

## 2024-08-20 NOTE — LETTER
8/20/2024      Willie Charles  [address]  [place] [address]      Dear Colleague,    Thank you for referring your patient, Willie Charles, to the [facility]. Please see a copy of my visit note below.    Virtual Visit Details    Type of service:  Video Visit   Video Start Time:  11:09  Video End Time: 11:25    Originating Location (pt. Location): Home    Distant Location (provider location):  Off-site  Platform used for Video Visit: [facility]  HEMATOLOGY & ONCOLOGY  FOLLOW UP VISIT    PATIENT NAME: Willie Charles          MRN # [id number]  YOB: 1960  DATE OF VISIT:  Aug 20, 2024            REFERRING PROVIDER:   Mr. Willie Charles was seen at the request of Arely for further evaluation and/or management.     Video Visit    History of Presenting Illness  Mr. Willie Charles is a pleasant 63 year old male with a past medical history significant for hyperthyroisidism s/p radioactive iodine, HTN  and MDS dx in 7/2023 after he had fatigue for several months found to have bicytopenia with WBC 3.3 and hemoglobin 7.0 and platelets 308 that led to a bone marrow biopsy on 7/19/2023 that showed hypocellular marrow with 10 to 20% cellularity and 2% blasts with megakaryocytic hyperplasia with dysplasia.  Cytogenetics showed 5 q. deletion and NGS showed BCORL1 with a VAF of 3%.  He was started on lenalidomide 5 mg daily on 8/11/2023.  His anemia worsened after a few days and he was then started on concurrent azacitidine in addition to Revlimid on 8/28/2023.  As per records his counts trended down by 9/2023 and his Revlimid was discontinued.  He was continued on 2 more cycles of azacitidine until October 2023.  He did see Dr. Squires at [place] in 11/2023 and was recommended to restart Revlimid starting at 2.5 mg dosing every other day for 3 weeks on and 1 week off.  He was started on 2.5 mg 2 times a week  and has been tolerating that for the last 3  months.He has continued to require about 2-4 prbcs in a month.  Denies any fevers or chills or ongoing bleeding from anywhere.    He has now established with the [facility] and has now been increased to lenalidomide 5mg daily for 3 weeks on 1 week off.     Subjective  Patient is on video with his wife Monica. He will finish another cycle of  lenalidomide 5mg daily for 3 weeks on and 1 week off by tomorrow 8/21.  Says that energy has remained the same, he required 2 units prbc. Patient denies any major side effects from the lenalidomide. He did have 2 days of rash which resolved on it own. Denies any bleeding from anywhere including BRBPR/Melena.     Past Medical History  No past medical history on file.    Family History  No family history on file.    Social History  Smoking: Never  Alcohol use: occasionally drinks alcohol  Status:   Children/grandchildren: Did not ask.   Occupation: Housing  , .     [employer]  Current Outpatient Medications  Current Outpatient Medications   Medication Sig Dispense Refill     acyclovir (ZOVIRAX) 400 MG tablet Take 400 mg by mouth 2 times daily       aspirin 81 MG EC tablet Take 81 mg by mouth daily       cyanocobalamin (VITAMIN B-12) 1000 MCG tablet Take 1,000 mcg by mouth daily       dexAMETHasone (DECADRON) 0.5 MG/5ML elixir Take 0.1 mg by mouth daily       fluticasone (FLONASE) 50 MCG/ACT nasal spray Spray 1 spray into both nostrils daily       levothyroxine (SYNTHROID/LEVOTHROID) 150 MCG tablet TAKE ONE TABLET BY MOUTH ONCE DAILY. DO NOT TAKE WITH IRON,ALUMINUM,MAGNESIUM,OR CALCIUM       loratadine (CLARITIN) 10 MG tablet Take 10 mg by mouth daily       metFORMIN (GLUCOPHAGE XR) 500 MG 24 hr tablet Take 500 mg by mouth daily (with dinner)       metoprolol succinate ER (TOPROL XL) 50 MG 24 hr tablet Take 1 tablet by mouth daily       montelukast (SINGULAIR) 10 MG tablet Take 10 mg by mouth at bedtime       REVLIMID 5 MG CAPS capsule Take 5 mg by  "mouth daily       rosuvastatin (CRESTOR) 10 MG tablet Take 10 mg by mouth daily       senna-docusate (SENNA S) 8.6-50 MG tablet Take 2 tablets by mouth daily as needed for constipation 60 tablet 2     timolol maleate (TIMOPTIC) 0.5 % ophthalmic solution Place 1 drop into both eyes 2 times daily       triamcinolone (KENALOG) 0.1 % external cream Apply topically 2 times daily 453.6 g 0     amLODIPine (NORVASC) 10 MG tablet Take 1 tablet by mouth daily       diltiazem ER COATED BEADS (CARDIZEM CD/CARTIA XT) 180 MG 24 hr capsule Take 180 mg by mouth daily       folic acid (FOLVITE) 1 MG tablet Take 1 mg by mouth daily       ondansetron (ZOFRAN) 8 MG tablet Take 8 mg by mouth every 8 hours as needed       prochlorperazine (COMPAZINE) 10 MG tablet Take 10 mg by mouth every 4 hours as needed       sildenafil (REVATIO) 20 MG tablet Take 1 tab on empty stomach one hour prior to sexual activity; may increase to a max of 5 tabs         Allergies  Allergies   Allergen Reactions     Benazepril Other (See Comments)     Critical     Penicillin V Rash       Review of systems  A complete ROS was performed and was negative except as mentioned in HPI    Physical Exam  Vitals:    08/20/24 1054   Weight: 90.7 kg (200 lb)   Height: 1.727 m (5' 8\")       Wt Readings from Last 3 Encounters:   08/20/24 90.7 kg (200 lb)   07/26/24 90.9 kg (200 lb 8 oz)   06/28/24 90.3 kg (199 lb)     Limited 2/2 video vist.  Appears comfortable, breathing comfortably without using accessory muscles.     Labs and Imaging    Sodium   Date Value Ref Range Status   07/26/2024 140 135 - 145 mmol/L Final    ,   Potassium   Date Value Ref Range Status   07/26/2024 4.3 3.4 - 5.3 mmol/L Final      Creatinine   Date Value Ref Range Status   07/26/2024 0.93 0.67 - 1.17 mg/dL Final       No results found for: \"LDH\"      Uric Acid   Date Value Ref Range Status   07/26/2024 4.1 3.4 - 7.0 mg/dL Final       WBC Count   Date Value Ref Range Status   07/26/2024 2.0 (L) 4.0 - " 11.0 10e3/uL Final   ,   Hemoglobin   Date Value Ref Range Status   07/26/2024 7.1 (L) 13.3 - 17.7 g/dL Final   ],   Platelet Count   Date Value Ref Range Status   07/26/2024 113 (L) 150 - 450 10e3/uL Final   ,    MCV   Date Value Ref Range Status   07/26/2024 85 78 - 100 fL Final         EPO level was 1213 on 9/2023.        Bone marrow biopsy 7/19/2023  FINAL DIAGNOSIS   Peripheral blood, bone marrow aspirate, biopsy and clot sections ([id number]; 07/19/2023):     1. Myelodysplastic syndrome with isolated del(5q).     2. Two small monotypic B-cell populations (3% and 11% of total events) of uncertain significance, reportedly   detected by flow cytometric analysis. See comment.     COMMENT   The significance of the small monotypic B-cell populations identified by flow cytometric analysis is uncertain, as   diagnostic features of lymphoma are not seen by morphology or demonstrated by immunohistochemistry. These findings may   represent monoclonal B-cell lymphocytosis, although minimal bone marrow involvement by B-cell lymphoma cannot be   excluded. Clinical and radiographic correlation is recommended.  A lymph node or other extramedullary tissue   biopsy is suggested if lymphoma is clinically and/or radiographically suspected.   Cytogenetics 5q deletion.   NGS BCORL1 VAF 3%      Bone marrow 2/21/2024  Peripheral blood, bone marrow aspirate, touch imprint, core biopsy, and clot:     -  Pancytopenia.     -  Inadequate bone marrow aspirate, core biopsy and clot sections.      -  The flow cytometry ([id number]) of the bone marrow shows approximately 3% monotypic B-cells positive for CD5, CD19, CD20, CD23,  and lambda, approximately 14% monotypic B-cells positive for CD5 (variable), CD19, CD20 (dim to negative), CD23 (dim), CD38 (parital) and , with no definitive light chain expression, and no increased blast population identified.   The marrow aspirate is hemodilute. The clot sections show no marrow  tissue. The biopsy core shows a tiny area (<5% core sections) with a cellular (~10%) bone marrow showing tri-lineage cells including clusters of small hypolobated megakaryocytes. The immunohistochemistry on the biopsy sections appears to show increased megakaryocytes and no definitive diagnostic features of lymphoma involvement. The presence of increased small hypolobated megakaryocytes suggests persistent disease of patient's known MDS with 5q deletion. Chromosome studies show that although 20 metaphases are routinely examined, only 1 was identified and appeared normal.   The significance of the two populations of monotypic B-cells identified by flow cytometry is uncertain, as diagnostic features of lymphoma are not seen by morphology or immunohistochemistry in this inadequate marrow biopsy specimen. These findings may represent monoclonal B-cell lymphocytosis of undetermined significance or involvement of the peripheral blood by a B-cell lymphoma, although bone marrow involvement by B-cell lymphoma cannot be excluded.     Assessment and Plan  Mr. Willie Charles is a 64 year old male who is here for further evaluation and/or management of MDS.    Oncology:  MDS with 5q deletion  WHO Classification: MDS w low blasts and 5q deletion  Date of diagnosis: 7/19/2023  Revised IPSS score: 2.5 Low  Molecular IPSS: -0.42 Moderate low  Bone Marrow Blast %: 2  Cytogenetics: 5q del  Molecular: BCORL1 VAF 3%  Past Treatment: lenalidomide 5mg on 8/11/2023 to 9/2023 with addition of azacitidine since 8/28/2023 until 10/2023.   Current Treatment: Restarted lenalidomide 2.5mg twice a week for 3 weeks on 1 week off since 11/2023. Off since 3/12.     Had an admission 4/18 from 4/22 and stopped lenalidomide on 4/17. Completed 3 weeks on and 1 week of of lenalidomide and restarted on 6/1 for a new cycle.     I discussed the pathophysiology and natural history of MDS with Mr. Willie Charles today. We went over the current  prognostic models and scoring systems that are used in clinical practice as well as the potential for disease evolution into acute myeloid leukemia. We went over the current FDA approved treatments for myelodysplastic syndromes and covered that the only curative option is through an allogeneic hematopoietic cell transplantation. His disease is  Moderate low  risk and currently BMT is not recommended.     We discussed that MDS with 5 q. responds well to lenalidomide and that he is not received adequate amount of treatment with lenalidomide.  Given that he is tolerating lenalidomide at this very low minimal dose I recommended that he increase the lenalidomide to 2.5 mg 5 times a week for 3 weeks on and then 1 week off.  If he continues to tolerate this well we can continue increasing the dose to eventually reach 10 mg 3 weeks on and 1 week off.  He can possibly be started on other agents if his anemia is not responding to lenalidomide including agents like Luspatercept.    He had a recent admission for neutropenic fever s/p antibiotics without blood cultures showing anything.  He did have diffuse ST elevations concerning for stefan/pericarditis.  Course was complicated by A-fib with RVR.  Not sure if the troponin leak was secondary to the RVR.  Though there are rare cases of arrhythmias with lenalidomide doubt if this was related to it.    He has now tolerated a full cycle of lenalidomide 2.5mg daily for 3 weeks and 1 week off.  We increased his lenalidomide to 5mg he has tolerated for 2 cycles with minimal rash that disappeared on its own without any treatment.     We will recommend increasing his lenalidomide to 10 mg for 3 weeks on and 1 week off starting 8/28. We do anticipate that he might need 30-35 days to recover his counts after this cycle. I will see him back on 9/25 before he is due to start his next cycle.     His ANC has been slowly improving and he has not had any infections. If ANC continues to be below  0.5 for more than 7 days and he continues to have recurrent infections then we can consider prophylactic antibiotics.     We will plan to pursue a bone marrow biopsy after 2 cycles of 10mg lenalidomide if no response is seen.     #Elevated ferritin 1676  - STOPPED Deferasirox.   - He has very minimal elevated ferritin and without quantification of liver iron stores would not start a chelating agent. Especially deferasirox which can cause agranulocytosis and liver function issues and kidney issues.   - His rash has now resolved since he stopped the deferasirix.     Plan:  - increase lenalidomide to 10mg q day for 3 weeks on and 1 wek off starting  8/28. Anticipate a prolonged cycle this time.   - RTC with me on 9/25 in person.   - c/w twice weekly labs and as needed transfusions at local oncologist.  - If ANC trends below 500 consistently for more than 1 week can start levofloxacin as prophylaxis.     Hematology:  Anemia/Thrombocytopenia:   Transfuse leukocyte reduced and irradiated blood products: 1 unit pRBC if hgb is 7.0-7.9, 2 units pRBCs if Hgb 6.0-6.9 g/dl, 1 unit platelets if PLT count is <20,000 or <50,000 with clinical bleeding.    Immunocompromised:  As a result of his disease and/or previous treatment. Mr. Willie Charles is immunocompromised. his last ANC is >500 and there is no need for prophylactic antibiotics at this time.     Cardiac:  Mr. Willie Charles has no history of heart disease.     Renal:  Creatinine results reviewed today. Mr. Willie Charles does not have any renal disease.    Hepatic:  Liver function tests reviewed today.    Psychosocial:  Mr. Willie Charles is coping well with his diagnosis.     All other questions and concerns were addressed and answered to Mr. Willie Charles's satisfaction.    Today, I spent a total of 40 minutes of face-to-face and non face-to-face time with Mr. Willie Charles. Time spent included review and discussion of diagnostic test results,  patient counseling, and coordination of care.    The longitudinal plan of care for the diagnosis(es)/condition(s) as documented were addressed during this visit. Due to the added complexity in care, I will continue to support Willie in the subsequent management and with ongoing continuity of care.    Haroon Ellis MD    Division of Hematology, Oncology and Transplantation  [facility]  P: [phone]      Again, thank you for allowing me to participate in the care of your patient.        Sincerely,        Haroon Ellis MD

## 2024-08-20 NOTE — NURSING NOTE
Current patient location: [address] [place] [id number]    Is the patient currently in the state of [place]? YES    Visit mode:VIDEO    If the visit is dropped, the patient can be reconnected by: TELEPHONE VISIT: Phone number: [phone]    Will anyone else be joining the visit? Yes, spouse Monica, will be there with him  (If patient encounters technical issues they should call [phone]    How would you like to obtain your AVS? MyChart    Are changes needed to the allergy or medication list? No    Are refills needed on medications prescribed by this physician? NO    Rooming Documentation:  Questionnaire(s) completed      Reason for visit: RECHECK    Denise LOPEZ

## 2024-08-20 NOTE — PROGRESS NOTES
Virtual Visit Details    Type of service:  Video Visit   Video Start Time:  11:09  Video End Time: 11:25    Originating Location (pt. Location): Home    Distant Location (provider location):  Off-site  Platform used for Video Visit: [facility]  HEMATOLOGY & ONCOLOGY  FOLLOW UP VISIT    PATIENT NAME: Willie Charles          MRN # [id number]  YOB: 1960  DATE OF VISIT:  Aug 20, 2024            REFERRING PROVIDER:   Mr. Willie Charles was seen at the request of Arely for further evaluation and/or management.     Video Visit    History of Presenting Illness  Mr. Willie Charles is a pleasant 63 year old male with a past medical history significant for hyperthyroisidism s/p radioactive iodine, HTN  and MDS dx in 7/2023 after he had fatigue for several months found to have bicytopenia with WBC 3.3 and hemoglobin 7.0 and platelets 308 that led to a bone marrow biopsy on 7/19/2023 that showed hypocellular marrow with 10 to 20% cellularity and 2% blasts with megakaryocytic hyperplasia with dysplasia.  Cytogenetics showed 5 q. deletion and NGS showed BCORL1 with a VAF of 3%.  He was started on lenalidomide 5 mg daily on 8/11/2023.  His anemia worsened after a few days and he was then started on concurrent azacitidine in addition to Revlimid on 8/28/2023.  As per records his counts trended down by 9/2023 and his Revlimid was discontinued.  He was continued on 2 more cycles of azacitidine until October 2023.  He did see Dr. Squires at [facility] in 11/2023 and was recommended to restart Revlimid starting at 2.5 mg dosing every other day for 3 weeks on and 1 week off.  He was started on 2.5 mg 2 times a week  and has been tolerating that for the last 3 months.He has continued to require about 2-4 prbcs in a month.  Denies any fevers or chills or ongoing bleeding from anywhere.    He has now established with the [facility] and has now been increased to lenalidomide 5mg daily for 3 weeks on  1 week off.     Subjective  Patient is on video with his wife Monica. He will finish another cycle of  lenalidomide 5mg daily for 3 weeks on and 1 week off by tomorrow 8/21.  Says that energy has remained the same, he required 2 units prbc. Patient denies any major side effects from the lenalidomide. He did have 2 days of rash which resolved on it own. Denies any bleeding from anywhere including BRBPR/Melena.     Past Medical History  No past medical history on file.    Family History  No family history on file.    Social History  Smoking: Never  Alcohol use: occasionally drinks alcohol  Status:   Children/grandchildren: Did not ask.   Occupation: Housing  , .     [employer]  Current Outpatient Medications  Current Outpatient Medications   Medication Sig Dispense Refill    acyclovir (ZOVIRAX) 400 MG tablet Take 400 mg by mouth 2 times daily      aspirin 81 MG EC tablet Take 81 mg by mouth daily      cyanocobalamin (VITAMIN B-12) 1000 MCG tablet Take 1,000 mcg by mouth daily      dexAMETHasone (DECADRON) 0.5 MG/5ML elixir Take 0.1 mg by mouth daily      fluticasone (FLONASE) 50 MCG/ACT nasal spray Spray 1 spray into both nostrils daily      levothyroxine (SYNTHROID/LEVOTHROID) 150 MCG tablet TAKE ONE TABLET BY MOUTH ONCE DAILY. DO NOT TAKE WITH IRON,ALUMINUM,MAGNESIUM,OR CALCIUM      loratadine (CLARITIN) 10 MG tablet Take 10 mg by mouth daily      metFORMIN (GLUCOPHAGE XR) 500 MG 24 hr tablet Take 500 mg by mouth daily (with dinner)      metoprolol succinate ER (TOPROL XL) 50 MG 24 hr tablet Take 1 tablet by mouth daily      montelukast (SINGULAIR) 10 MG tablet Take 10 mg by mouth at bedtime      REVLIMID 5 MG CAPS capsule Take 5 mg by mouth daily      rosuvastatin (CRESTOR) 10 MG tablet Take 10 mg by mouth daily      senna-docusate (SENNA S) 8.6-50 MG tablet Take 2 tablets by mouth daily as needed for constipation 60 tablet 2    timolol maleate (TIMOPTIC) 0.5 % ophthalmic solution Place 1  "drop into both eyes 2 times daily      triamcinolone (KENALOG) 0.1 % external cream Apply topically 2 times daily 453.6 g 0    amLODIPine (NORVASC) 10 MG tablet Take 1 tablet by mouth daily      diltiazem ER COATED BEADS (CARDIZEM CD/CARTIA XT) 180 MG 24 hr capsule Take 180 mg by mouth daily      folic acid (FOLVITE) 1 MG tablet Take 1 mg by mouth daily      ondansetron (ZOFRAN) 8 MG tablet Take 8 mg by mouth every 8 hours as needed      prochlorperazine (COMPAZINE) 10 MG tablet Take 10 mg by mouth every 4 hours as needed      sildenafil (REVATIO) 20 MG tablet Take 1 tab on empty stomach one hour prior to sexual activity; may increase to a max of 5 tabs         Allergies  Allergies   Allergen Reactions    Benazepril Other (See Comments)     Critical    Penicillin V Rash       Review of systems  A complete ROS was performed and was negative except as mentioned in HPI    Physical Exam  Vitals:    08/20/24 1054   Weight: 90.7 kg (200 lb)   Height: 1.727 m (5' 8\")       Wt Readings from Last 3 Encounters:   08/20/24 90.7 kg (200 lb)   07/26/24 90.9 kg (200 lb 8 oz)   06/28/24 90.3 kg (199 lb)     Limited 2/2 video vist.  Appears comfortable, breathing comfortably without using accessory muscles.     Labs and Imaging    Sodium   Date Value Ref Range Status   07/26/2024 140 135 - 145 mmol/L Final    ,   Potassium   Date Value Ref Range Status   07/26/2024 4.3 3.4 - 5.3 mmol/L Final      Creatinine   Date Value Ref Range Status   07/26/2024 0.93 0.67 - 1.17 mg/dL Final       No results found for: \"LDH\"      Uric Acid   Date Value Ref Range Status   07/26/2024 4.1 3.4 - 7.0 mg/dL Final       WBC Count   Date Value Ref Range Status   07/26/2024 2.0 (L) 4.0 - 11.0 10e3/uL Final   ,   Hemoglobin   Date Value Ref Range Status   07/26/2024 7.1 (L) 13.3 - 17.7 g/dL Final   ],   Platelet Count   Date Value Ref Range Status   07/26/2024 113 (L) 150 - 450 10e3/uL Final   ,    MCV   Date Value Ref Range Status   07/26/2024 85 78 - " 100 fL Final         EPO level was 1213 on 9/2023.        Bone marrow biopsy 7/19/2023  FINAL DIAGNOSIS   Peripheral blood, bone marrow aspirate, biopsy and clot sections ([id number]; 07/19/2023):     1. Myelodysplastic syndrome with isolated del(5q).     2. Two small monotypic B-cell populations (3% and 11% of total events) of uncertain significance, reportedly   detected by flow cytometric analysis. See comment.     COMMENT   The significance of the small monotypic B-cell populations identified by flow cytometric analysis is uncertain, as   diagnostic features of lymphoma are not seen by morphology or demonstrated by immunohistochemistry. These findings may   represent monoclonal B-cell lymphocytosis, although minimal bone marrow involvement by B-cell lymphoma cannot be   excluded. Clinical and radiographic correlation is recommended.  A lymph node or other extramedullary tissue   biopsy is suggested if lymphoma is clinically and/or radiographically suspected.   Cytogenetics 5q deletion.   NGS BCORL1 VAF 3%      Bone marrow 2/21/2024  Peripheral blood, bone marrow aspirate, touch imprint, core biopsy, and clot:     -  Pancytopenia.     -  Inadequate bone marrow aspirate, core biopsy and clot sections.      -  The flow cytometry ([id number]) of the bone marrow shows approximately 3% monotypic B-cells positive for CD5, CD19, CD20, CD23,  and lambda, approximately 14% monotypic B-cells positive for CD5 (variable), CD19, CD20 (dim to negative), CD23 (dim), CD38 (parital) and , with no definitive light chain expression, and no increased blast population identified.   The marrow aspirate is hemodilute. The clot sections show no marrow tissue. The biopsy core shows a tiny area (<5% core sections) with a cellular (~10%) bone marrow showing tri-lineage cells including clusters of small hypolobated megakaryocytes. The immunohistochemistry on the biopsy sections appears to show increased megakaryocytes and  no definitive diagnostic features of lymphoma involvement. The presence of increased small hypolobated megakaryocytes suggests persistent disease of patient's known MDS with 5q deletion. Chromosome studies show that although 20 metaphases are routinely examined, only 1 was identified and appeared normal.   The significance of the two populations of monotypic B-cells identified by flow cytometry is uncertain, as diagnostic features of lymphoma are not seen by morphology or immunohistochemistry in this inadequate marrow biopsy specimen. These findings may represent monoclonal B-cell lymphocytosis of undetermined significance or involvement of the peripheral blood by a B-cell lymphoma, although bone marrow involvement by B-cell lymphoma cannot be excluded.     Assessment and Plan  Mr. Willie Charles is a 64 year old male who is here for further evaluation and/or management of MDS.    Oncology:  MDS with 5q deletion  WHO Classification: MDS w low blasts and 5q deletion  Date of diagnosis: 7/19/2023  Revised IPSS score: 2.5 Low  Molecular IPSS: -0.42 Moderate low  Bone Marrow Blast %: 2  Cytogenetics: 5q del  Molecular: BCORL1 VAF 3%  Past Treatment: lenalidomide 5mg on 8/11/2023 to 9/2023 with addition of azacitidine since 8/28/2023 until 10/2023.   Current Treatment: Restarted lenalidomide 2.5mg twice a week for 3 weeks on 1 week off since 11/2023. Off since 3/12.     Had an admission 4/18 from 4/22 and stopped lenalidomide on 4/17. Completed 3 weeks on and 1 week of of lenalidomide and restarted on 6/1 for a new cycle.     I discussed the pathophysiology and natural history of MDS with Mr. Willie Charles today. We went over the current prognostic models and scoring systems that are used in clinical practice as well as the potential for disease evolution into acute myeloid leukemia. We went over the current FDA approved treatments for myelodysplastic syndromes and covered that the only curative option is through  an allogeneic hematopoietic cell transplantation. His disease is  Moderate low  risk and currently BMT is not recommended.     We discussed that MDS with 5 q. responds well to lenalidomide and that he is not received adequate amount of treatment with lenalidomide.  Given that he is tolerating lenalidomide at this very low minimal dose I recommended that he increase the lenalidomide to 2.5 mg 5 times a week for 3 weeks on and then 1 week off.  If he continues to tolerate this well we can continue increasing the dose to eventually reach 10 mg 3 weeks on and 1 week off.  He can possibly be started on other agents if his anemia is not responding to lenalidomide including agents like Luspatercept.    He had a recent admission for neutropenic fever s/p antibiotics without blood cultures showing anything.  He did have diffuse ST elevations concerning for stefan/pericarditis.  Course was complicated by A-fib with RVR.  Not sure if the troponin leak was secondary to the RVR.  Though there are rare cases of arrhythmias with lenalidomide doubt if this was related to it.    He has now tolerated a full cycle of lenalidomide 2.5mg daily for 3 weeks and 1 week off.  We increased his lenalidomide to 5mg he has tolerated for 2 cycles with minimal rash that disappeared on its own without any treatment.     We will recommend increasing his lenalidomide to 10 mg for 3 weeks on and 1 week off starting 8/28. We do anticipate that he might need 30-35 days to recover his counts after this cycle. I will see him back on 9/25 before he is due to start his next cycle.     His ANC has been slowly improving and he has not had any infections. If ANC continues to be below 0.5 for more than 7 days and he continues to have recurrent infections then we can consider prophylactic antibiotics.     We will plan to pursue a bone marrow biopsy after 2 cycles of 10mg lenalidomide if no response is seen.     #Elevated ferritin 1676  - STOPPED Deferasirox.   -  He has very minimal elevated ferritin and without quantification of liver iron stores would not start a chelating agent. Especially deferasirox which can cause agranulocytosis and liver function issues and kidney issues.   - His rash has now resolved since he stopped the deferasirix.     Plan:  - increase lenalidomide to 10mg q day for 3 weeks on and 1 wek off starting  8/28. Anticipate a prolonged cycle this time.   - RTC with me on 9/25 in person.   - c/w twice weekly labs and as needed transfusions at local oncologist.  - If ANC trends below 500 consistently for more than 1 week can start levofloxacin as prophylaxis.     Hematology:  Anemia/Thrombocytopenia:   Transfuse leukocyte reduced and irradiated blood products: 1 unit pRBC if hgb is 7.0-7.9, 2 units pRBCs if Hgb 6.0-6.9 g/dl, 1 unit platelets if PLT count is <20,000 or <50,000 with clinical bleeding.    Immunocompromised:  As a result of his disease and/or previous treatment. Mr. Willie Charles is immunocompromised. his last ANC is >500 and there is no need for prophylactic antibiotics at this time.     Cardiac:  Mr. Willie Charles has no history of heart disease.     Renal:  Creatinine results reviewed today. Mr. Willie Charles does not have any renal disease.    Hepatic:  Liver function tests reviewed today.    Psychosocial:  Mr. Willie Charles is coping well with his diagnosis.     All other questions and concerns were addressed and answered to Mr. Willie Charles's satisfaction.    Today, I spent a total of 40 minutes of face-to-face and non face-to-face time with Mr. Willie Charles. Time spent included review and discussion of diagnostic test results, patient counseling, and coordination of care.    The longitudinal plan of care for the diagnosis(es)/condition(s) as documented were addressed during this visit. Due to the added complexity in care, I will continue to support Willie in the subsequent management and with ongoing  continuity of care.    Haroon Ellis MD    Division of Hematology, Oncology and Transplantation  [facility]  P: [phone]

## 2024-09-25 ENCOUNTER — ONCOLOGY VISIT (OUTPATIENT)
Dept: ONCOLOGY | Facility: CLINIC | Age: 64
End: 2024-09-25
Attending: STUDENT IN AN ORGANIZED HEALTH CARE EDUCATION/TRAINING PROGRAM
Payer: COMMERCIAL

## 2024-09-25 ENCOUNTER — APPOINTMENT (OUTPATIENT)
Dept: LAB | Facility: CLINIC | Age: 64
End: 2024-09-25
Attending: STUDENT IN AN ORGANIZED HEALTH CARE EDUCATION/TRAINING PROGRAM
Payer: COMMERCIAL

## 2024-09-25 VITALS
TEMPERATURE: 98.5 F | SYSTOLIC BLOOD PRESSURE: 133 MMHG | HEART RATE: 72 BPM | DIASTOLIC BLOOD PRESSURE: 62 MMHG | WEIGHT: 195 LBS | RESPIRATION RATE: 16 BRPM | OXYGEN SATURATION: 99 % | BODY MASS INDEX: 29.65 KG/M2

## 2024-09-25 DIAGNOSIS — D46.9 MDS (MYELODYSPLASTIC SYNDROME) (H): ICD-10-CM

## 2024-09-25 LAB
ALBUMIN SERPL BCG-MCNC: 3.9 G/DL (ref 3.5–5.2)
ALP SERPL-CCNC: 90 U/L (ref 40–150)
ALT SERPL W P-5'-P-CCNC: 75 U/L (ref 0–70)
ANION GAP SERPL CALCULATED.3IONS-SCNC: 8 MMOL/L (ref 7–15)
AST SERPL W P-5'-P-CCNC: 33 U/L (ref 0–45)
BASOPHILS # BLD AUTO: 0 10E3/UL (ref 0–0.2)
BASOPHILS NFR BLD AUTO: 1 %
BILIRUB SERPL-MCNC: 0.2 MG/DL
BUN SERPL-MCNC: 18.1 MG/DL (ref 8–23)
CALCIUM SERPL-MCNC: 8.8 MG/DL (ref 8.8–10.4)
CHLORIDE SERPL-SCNC: 107 MMOL/L (ref 98–107)
CREAT SERPL-MCNC: 0.81 MG/DL (ref 0.67–1.17)
EGFRCR SERPLBLD CKD-EPI 2021: >90 ML/MIN/1.73M2
EOSINOPHIL # BLD AUTO: 0 10E3/UL (ref 0–0.7)
EOSINOPHIL NFR BLD AUTO: 2 %
ERYTHROCYTE [DISTWIDTH] IN BLOOD BY AUTOMATED COUNT: 12.6 % (ref 10–15)
GLUCOSE SERPL-MCNC: 147 MG/DL (ref 70–99)
HCO3 SERPL-SCNC: 24 MMOL/L (ref 22–29)
HCT VFR BLD AUTO: 21.8 % (ref 40–53)
HGB BLD-MCNC: 7.6 G/DL (ref 13.3–17.7)
IMM GRANULOCYTES # BLD: 0 10E3/UL
IMM GRANULOCYTES NFR BLD: 0 %
LYMPHOCYTES # BLD AUTO: 1.2 10E3/UL (ref 0.8–5.3)
LYMPHOCYTES NFR BLD AUTO: 67 %
MCH RBC QN AUTO: 29.5 PG (ref 26.5–33)
MCHC RBC AUTO-ENTMCNC: 34.9 G/DL (ref 31.5–36.5)
MCV RBC AUTO: 85 FL (ref 78–100)
MONOCYTES # BLD AUTO: 0.1 10E3/UL (ref 0–1.3)
MONOCYTES NFR BLD AUTO: 7 %
NEUTROPHILS # BLD AUTO: 0.4 10E3/UL (ref 1.6–8.3)
NEUTROPHILS NFR BLD AUTO: 23 %
NRBC # BLD AUTO: 0 10E3/UL
NRBC BLD AUTO-RTO: 0 /100
PLAT MORPH BLD: NORMAL
PLATELET # BLD AUTO: 117 10E3/UL (ref 150–450)
POTASSIUM SERPL-SCNC: 4.1 MMOL/L (ref 3.4–5.3)
PROT SERPL-MCNC: 6.2 G/DL (ref 6.4–8.3)
RBC # BLD AUTO: 2.58 10E6/UL (ref 4.4–5.9)
RBC MORPH BLD: NORMAL
SODIUM SERPL-SCNC: 139 MMOL/L (ref 135–145)
URATE SERPL-MCNC: 3.9 MG/DL (ref 3.4–7)
WBC # BLD AUTO: 1.9 10E3/UL (ref 4–11)

## 2024-09-25 PROCEDURE — 84550 ASSAY OF BLOOD/URIC ACID: CPT | Performed by: STUDENT IN AN ORGANIZED HEALTH CARE EDUCATION/TRAINING PROGRAM

## 2024-09-25 PROCEDURE — 36415 COLL VENOUS BLD VENIPUNCTURE: CPT | Performed by: STUDENT IN AN ORGANIZED HEALTH CARE EDUCATION/TRAINING PROGRAM

## 2024-09-25 PROCEDURE — 85025 COMPLETE CBC W/AUTO DIFF WBC: CPT | Performed by: STUDENT IN AN ORGANIZED HEALTH CARE EDUCATION/TRAINING PROGRAM

## 2024-09-25 PROCEDURE — 84155 ASSAY OF PROTEIN SERUM: CPT | Performed by: STUDENT IN AN ORGANIZED HEALTH CARE EDUCATION/TRAINING PROGRAM

## 2024-09-25 PROCEDURE — 99215 OFFICE O/P EST HI 40 MIN: CPT | Performed by: STUDENT IN AN ORGANIZED HEALTH CARE EDUCATION/TRAINING PROGRAM

## 2024-09-25 PROCEDURE — G0463 HOSPITAL OUTPT CLINIC VISIT: HCPCS | Performed by: STUDENT IN AN ORGANIZED HEALTH CARE EDUCATION/TRAINING PROGRAM

## 2024-09-25 PROCEDURE — G2211 COMPLEX E/M VISIT ADD ON: HCPCS | Performed by: STUDENT IN AN ORGANIZED HEALTH CARE EDUCATION/TRAINING PROGRAM

## 2024-09-25 ASSESSMENT — PAIN SCALES - GENERAL: PAINLEVEL: NO PAIN (0)

## 2024-09-25 NOTE — PROGRESS NOTES
[facility]  HEMATOLOGY & ONCOLOGY  FOLLOW UP VISIT    PATIENT NAME: Willie Charles          MRN # [id number]  YOB: 1960  DATE OF VISIT:  Sep 25, 2024            REFERRING PROVIDER:   Mr. Willie Charles was seen at the request of Arely for further evaluation and/or management.     History of Presenting Illness  Mr. Willie Charles is a pleasant 63 year old male with a past medical history significant for hyperthyroisidism s/p radioactive iodine, HTN  and MDS dx in 7/2023 after he had fatigue for several months found to have bicytopenia with WBC 3.3 and hemoglobin 7.0 and platelets 308 that led to a bone marrow biopsy on 7/19/2023 that showed hypocellular marrow with 10 to 20% cellularity and 2% blasts with megakaryocytic hyperplasia with dysplasia.  Cytogenetics showed 5 q. deletion and NGS showed BCORL1 with a VAF of 3%.  He was started on lenalidomide 5 mg daily on 8/11/2023.  His anemia worsened after a few days and he was then started on concurrent azacitidine in addition to Revlimid on 8/28/2023.  As per records his counts trended down by 9/2023 and his Revlimid was discontinued.  He was continued on 2 more cycles of azacitidine until October 2023.  He did see Dr. Squires at [place] in 11/2023 and was recommended to restart Revlimid starting at 2.5 mg dosing every other day for 3 weeks on and 1 week off.  He was started on 2.5 mg 2 times a week  and has been tolerating that for the last 3 months.He has continued to require about 2-4 prbcs in a month.  Denies any fevers or chills or ongoing bleeding from anywhere.    He has now established with the [facility] and has now been increased to lenalidomide 5mg daily for 3 weeks on 1 week off.     Subjective  Patient is here with his wife Monica and son. He completed another cycle of  lenalidomide 10mg daily for 3 weeks on and 1 week off by tomorrow 9/26.  Says that energy remained the same, however he felt like he had brain fog and had  trouble with many tasks though he was more active this month.  Patient denies any major side effects from the lenalidomide except a few days of rash that resolved on its own and some stomach upset. Denies any bleeding from anywhere including BRBPR/Melena.     Past Medical History  No past medical history on file.    Family History  No family history on file.    Social History  Smoking: Never  Alcohol use: occasionally drinks alcohol  Status:   Children/grandchildren: Did not ask.   Occupation: Housing  , .     [employer]  Current Outpatient Medications  Current Outpatient Medications   Medication Sig Dispense Refill    acyclovir (ZOVIRAX) 400 MG tablet Take 400 mg by mouth 2 times daily      aspirin 81 MG EC tablet Take 81 mg by mouth daily      cyanocobalamin (VITAMIN B-12) 1000 MCG tablet Take 1,000 mcg by mouth daily      diltiazem ER COATED BEADS (CARDIZEM CD/CARTIA XT) 180 MG 24 hr capsule Take 180 mg by mouth daily      fluticasone (FLONASE) 50 MCG/ACT nasal spray Spray 1 spray into both nostrils daily      folic acid (FOLVITE) 1 MG tablet Take 1 mg by mouth daily      levothyroxine (SYNTHROID/LEVOTHROID) 150 MCG tablet TAKE ONE TABLET BY MOUTH ONCE DAILY. DO NOT TAKE WITH IRON,ALUMINUM,MAGNESIUM,OR CALCIUM      loratadine (CLARITIN) 10 MG tablet Take 10 mg by mouth daily      metFORMIN (GLUCOPHAGE XR) 500 MG 24 hr tablet Take 500 mg by mouth daily (with dinner)      metoprolol succinate ER (TOPROL XL) 50 MG 24 hr tablet Take 1 tablet by mouth daily      montelukast (SINGULAIR) 10 MG tablet Take 10 mg by mouth at bedtime      REVLIMID 5 MG CAPS capsule Take 5 mg by mouth daily      rosuvastatin (CRESTOR) 10 MG tablet Take 10 mg by mouth daily      senna-docusate (SENNA S) 8.6-50 MG tablet Take 2 tablets by mouth daily as needed for constipation 60 tablet 2    sildenafil (REVATIO) 20 MG tablet Take 1 tab on empty stomach one hour prior to sexual activity; may increase to a max of 5  tabs      timolol maleate (TIMOPTIC) 0.5 % ophthalmic solution Place 1 drop into both eyes 2 times daily      triamcinolone (KENALOG) 0.1 % external cream Apply topically 2 times daily 453.6 g 0    amLODIPine (NORVASC) 10 MG tablet Take 1 tablet by mouth daily (Patient not taking: Reported on 2024)      dexAMETHasone (DECADRON) 0.5 MG/5ML elixir Take 0.1 mg by mouth daily (Patient not taking: Reported on 2024)      ondansetron (ZOFRAN) 8 MG tablet Take 8 mg by mouth every 8 hours as needed (Patient not taking: Reported on 2024)      prochlorperazine (COMPAZINE) 10 MG tablet Take 10 mg by mouth every 4 hours as needed (Patient not taking: Reported on 2024)         Allergies  Allergies   Allergen Reactions    Benazepril Other (See Comments)     Critical    Penicillin V Rash       Review of systems  A complete ROS was performed and was negative except as mentioned in HPI    Physical Exam  Vitals:    24 1141   BP: 133/62   Pulse: 72   Resp: 16   Temp: 98.5  F (36.9  C)   TempSrc: Oral   SpO2: 99%   Weight: 88.5 kg (195 lb)       Wt Readings from Last 3 Encounters:   24 88.5 kg (195 lb)   24 90.7 kg (200 lb)   24 90.9 kg (200 lb 8 oz)     ECO   male sitting in chair in NAD  HEET: NC/AT, EOMI w/ PERRL, anicteric sclera. MMM. No mouth sores.   Neck: supple no JVP  CV: normal S1,S2 with RRR no m/r/g  Resp: good air movement b/l. No wheezes or crackles  Abd: soft, NTND, no organomegaly or masses. BS normoactive.   Ext: WWP no edema or cyanosis  Neuro: A&Ox4, no lateralizing sx. Grossly nonfocal. Strength appears preserved.   Lymph: No cervical, supraclavicular, axillary or inguinal LAD  Skin: no concerning lesions or rashes or petechiae    Labs and Imaging    Sodium   Date Value Ref Range Status   2024 139 135 - 145 mmol/L Final    ,   Potassium   Date Value Ref Range Status   2024 4.1 3.4 - 5.3 mmol/L Final      Creatinine   Date Value Ref Range Status  "  09/25/2024 0.81 0.67 - 1.17 mg/dL Final       No results found for: \"LDH\"      Uric Acid   Date Value Ref Range Status   09/25/2024 3.9 3.4 - 7.0 mg/dL Final       WBC Count   Date Value Ref Range Status   09/25/2024 1.9 (L) 4.0 - 11.0 10e3/uL Final   ,   Hemoglobin   Date Value Ref Range Status   09/25/2024 7.6 (L) 13.3 - 17.7 g/dL Final   ],   Platelet Count   Date Value Ref Range Status   09/25/2024 117 (L) 150 - 450 10e3/uL Final   ,    MCV   Date Value Ref Range Status   09/25/2024 85 78 - 100 fL Final         EPO level was 1213 on 9/2023.        Bone marrow biopsy 7/19/2023  FINAL DIAGNOSIS   Peripheral blood, bone marrow aspirate, biopsy and clot sections ([id number]; 07/19/2023):     1. Myelodysplastic syndrome with isolated del(5q).     2. Two small monotypic B-cell populations (3% and 11% of total events) of uncertain significance, reportedly   detected by flow cytometric analysis. See comment.     COMMENT   The significance of the small monotypic B-cell populations identified by flow cytometric analysis is uncertain, as   diagnostic features of lymphoma are not seen by morphology or demonstrated by immunohistochemistry. These findings may   represent monoclonal B-cell lymphocytosis, although minimal bone marrow involvement by B-cell lymphoma cannot be   excluded. Clinical and radiographic correlation is recommended.  A lymph node or other extramedullary tissue   biopsy is suggested if lymphoma is clinically and/or radiographically suspected.   Cytogenetics 5q deletion.   NGS BCORL1 VAF 3%      Bone marrow 2/21/2024  Peripheral blood, bone marrow aspirate, touch imprint, core biopsy, and clot:     -  Pancytopenia.     -  Inadequate bone marrow aspirate, core biopsy and clot sections.      -  The flow cytometry ([id number]) of the bone marrow shows approximately 3% monotypic B-cells positive for CD5, CD19, CD20, CD23,  and lambda, approximately 14% monotypic B-cells positive for CD5 " (variable), CD19, CD20 (dim to negative), CD23 (dim), CD38 (parital) and , with no definitive light chain expression, and no increased blast population identified.   The marrow aspirate is hemodilute. The clot sections show no marrow tissue. The biopsy core shows a tiny area (<5% core sections) with a cellular (~10%) bone marrow showing tri-lineage cells including clusters of small hypolobated megakaryocytes. The immunohistochemistry on the biopsy sections appears to show increased megakaryocytes and no definitive diagnostic features of lymphoma involvement. The presence of increased small hypolobated megakaryocytes suggests persistent disease of patient's known MDS with 5q deletion. Chromosome studies show that although 20 metaphases are routinely examined, only 1 was identified and appeared normal.   The significance of the two populations of monotypic B-cells identified by flow cytometry is uncertain, as diagnostic features of lymphoma are not seen by morphology or immunohistochemistry in this inadequate marrow biopsy specimen. These findings may represent monoclonal B-cell lymphocytosis of undetermined significance or involvement of the peripheral blood by a B-cell lymphoma, although bone marrow involvement by B-cell lymphoma cannot be excluded.     Assessment and Plan  Mr. Willie Charles is a 64 year old male who is here for further evaluation and/or management of MDS.    Oncology:  MDS with 5q deletion  WHO Classification: MDS w low blasts and 5q deletion  Date of diagnosis: 7/19/2023  Revised IPSS score: 2.5 Low  Molecular IPSS: -0.42 Moderate low  Bone Marrow Blast %: 2  Cytogenetics: 5q del  Molecular: BCORL1 VAF 3%  Past Treatment: lenalidomide 5mg on 8/11/2023 to 9/2023 with addition of azacitidine since 8/28/2023 until 10/2023.   Current Treatment: Restarted lenalidomide 2.5mg twice a week for 3 weeks on 1 week off since 11/2023. Off since 3/12. Last cycle lenalidomide 10mg starting 8/28. Next  cycle lenalidomide 10mg on 9/30.     Had an admission 4/18 from 4/22 and stopped lenalidomide on 4/17. Completed 3 weeks on and 1 week of of lenalidomide and restarted on 6/1 for a new cycle.     I discussed the pathophysiology and natural history of MDS with Mr. Willie Charles today. We went over the current prognostic models and scoring systems that are used in clinical practice as well as the potential for disease evolution into acute myeloid leukemia. We went over the current FDA approved treatments for myelodysplastic syndromes and covered that the only curative option is through an allogeneic hematopoietic cell transplantation. His disease is  Moderate low  risk and currently BMT is not recommended.     We discussed that MDS with 5 q. responds well to lenalidomide and that he is not received adequate amount of treatment with lenalidomide.  Given that he is tolerating lenalidomide at this very low minimal dose I recommended that he increase the lenalidomide to 2.5 mg 5 times a week for 3 weeks on and then 1 week off.  If he continues to tolerate this well we can continue increasing the dose to eventually reach 10 mg 3 weeks on and 1 week off.  He can possibly be started on other agents if his anemia is not responding to lenalidomide including agents like Luspatercept.    He had a recent admission for neutropenic fever s/p antibiotics without blood cultures showing anything.  He did have diffuse ST elevations concerning for stefan/pericarditis.  Course was complicated by A-fib with RVR.  Not sure if the troponin leak was secondary to the RVR.  Though there are rare cases of arrhythmias with lenalidomide doubt if this was related to it.    He has now tolerated a full cycle of lenalidomide 2.5mg daily for 3 weeks and 1 week off.  We increased his lenalidomide to 5mg he has tolerated for 2 cycles with minimal rash that disappeared on its own without any treatment.     He tolerated the lenalidomide 10 mg for 3 weeks  on and 1 week off starting 8/28. He did not have significant cytopenias with increased dose. His ANC today is 0.4 . We will start next cycle on 9/30. He will send me a message ~day 10 to let me know how he is tolerating the lenalidomide to decide if he should STOP lenalidomide at day 14 and do 2 weeks off vs continue for 21 days and also determine whether to dose reduce lenalidomide to 7.5mg for next cycle.      His ANC has been slowly improving and he has not had any infections. If ANC continues to be below 0.5 for more than 7 days and he continues to have recurrent infections then we can consider prophylactic antibiotics.     We will plan to pursue a bone marrow biopsy after 2 cycles of 10mg lenalidomide if no response is seen.     #Elevated ferritin 1676  - STOPPED Deferasirox.   - He has very minimal elevated ferritin and without quantification of liver iron stores would not start a chelating agent. Especially deferasirox which can cause agranulocytosis and liver function issues and kidney issues.   - His rash has now resolved since he stopped the deferasirix.     Plan:  - c/w lenalidomide to 10mg q day for 3 weeks on and 1 wek off starting 9/30.  - RTC with me on 10/25 as video visit.   - c/w twice weekly labs and as needed transfusions at local oncologist.  - If ANC trends below 500 consistently for more than 1 week can start levofloxacin as prophylaxis.     Hematology:  Anemia/Thrombocytopenia:   Transfuse leukocyte reduced and irradiated blood products: 1 unit pRBC if hgb is 7.0-7.9, 2 units pRBCs if Hgb 6.0-6.9 g/dl, 1 unit platelets if PLT count is <20,000 or <50,000 with clinical bleeding.    Immunocompromised:  As a result of his disease and/or previous treatment. Mr. Willie Charles is immunocompromised. his last ANC is >500 and there is no need for prophylactic antibiotics at this time.     Cardiac:  Mr. Willie Charles has no history of heart disease.     Renal:  Creatinine results reviewed today.  Mr. Willie Charles does not have any renal disease.    Hepatic:  Liver function tests reviewed today.    Psychosocial:  Mr. Willie Charles is coping well with his diagnosis.     All other questions and concerns were addressed and answered to Mr. Willie Charles's satisfaction.    Today, I spent a total of 40 minutes of face-to-face and non face-to-face time with Mr. Willie Charles. Time spent included review and discussion of diagnostic test results, patient counseling, and coordination of care.    The longitudinal plan of care for the diagnosis(es)/condition(s) as documented were addressed during this visit. Due to the added complexity in care, I will continue to support Willie in the subsequent management and with ongoing continuity of care.    Haroon Ellis MD    Division of Hematology, Oncology and Transplantation  [facility]  P: [phone]

## 2024-09-25 NOTE — NURSING NOTE
Chief Complaint   Patient presents with    Blood Draw     Labs drawn via  by RN in lab.  VS taken       Labs collected from venipuncture by RN. Vitals taken. Checked in for appointment(s).    Kate Pacheco RN

## 2024-09-25 NOTE — LETTER
9/25/2024      Willie Charles  [address]  [facility] [address]      Dear Colleague,    Thank you for referring your patient, Willie Charles, to the [facility]. Please see a copy of my visit note below.    [facility]  HEMATOLOGY & ONCOLOGY  FOLLOW UP VISIT    PATIENT NAME: Willie Charles          MRN # [id number]  YOB: 1960  DATE OF VISIT:  Sep 25, 2024            REFERRING PROVIDER:   Mr. Willie Charles was seen at the request of Arely for further evaluation and/or management.     History of Presenting Illness  Mr. Willie Charles is a pleasant 63 year old male with a past medical history significant for hyperthyroisidism s/p radioactive iodine, HTN  and MDS dx in 7/2023 after he had fatigue for several months found to have bicytopenia with WBC 3.3 and hemoglobin 7.0 and platelets 308 that led to a bone marrow biopsy on 7/19/2023 that showed hypocellular marrow with 10 to 20% cellularity and 2% blasts with megakaryocytic hyperplasia with dysplasia.  Cytogenetics showed 5 q. deletion and NGS showed BCORL1 with a VAF of 3%.  He was started on lenalidomide 5 mg daily on 8/11/2023.  His anemia worsened after a few days and he was then started on concurrent azacitidine in addition to Revlimid on 8/28/2023.  As per records his counts trended down by 9/2023 and his Revlimid was discontinued.  He was continued on 2 more cycles of azacitidine until October 2023.  He did see Dr. Squires at [facility] in 11/2023 and was recommended to restart Revlimid starting at 2.5 mg dosing every other day for 3 weeks on and 1 week off.  He was started on 2.5 mg 2 times a week  and has been tolerating that for the last 3 months.He has continued to require about 2-4 prbcs in a month.  Denies any fevers or chills or ongoing bleeding from anywhere.    He has now established with the [facility] clinic and has now been increased to lenalidomide 5mg daily for 3 weeks on 1 week off.      Subjective  Patient is here with his wife Monica and son. He completed another cycle of  lenalidomide 10mg daily for 3 weeks on and 1 week off by tomorrow 9/26.  Says that energy remained the same, however he felt like he had brain fog and had trouble with many tasks though he was more active this month.  Patient denies any major side effects from the lenalidomide except a few days of rash that resolved on its own and some stomach upset. Denies any bleeding from anywhere including BRBPR/Melena.     Past Medical History  No past medical history on file.    Family History  No family history on file.    Social History  Smoking: Never  Alcohol use: occasionally drinks alcohol  Status:   Children/grandchildren: Did not ask.   Occupation: Housing  , .     [employer]  Current Outpatient Medications  Current Outpatient Medications   Medication Sig Dispense Refill     acyclovir (ZOVIRAX) 400 MG tablet Take 400 mg by mouth 2 times daily       aspirin 81 MG EC tablet Take 81 mg by mouth daily       cyanocobalamin (VITAMIN B-12) 1000 MCG tablet Take 1,000 mcg by mouth daily       diltiazem ER COATED BEADS (CARDIZEM CD/CARTIA XT) 180 MG 24 hr capsule Take 180 mg by mouth daily       fluticasone (FLONASE) 50 MCG/ACT nasal spray Spray 1 spray into both nostrils daily       folic acid (FOLVITE) 1 MG tablet Take 1 mg by mouth daily       levothyroxine (SYNTHROID/LEVOTHROID) 150 MCG tablet TAKE ONE TABLET BY MOUTH ONCE DAILY. DO NOT TAKE WITH IRON,ALUMINUM,MAGNESIUM,OR CALCIUM       loratadine (CLARITIN) 10 MG tablet Take 10 mg by mouth daily       metFORMIN (GLUCOPHAGE XR) 500 MG 24 hr tablet Take 500 mg by mouth daily (with dinner)       metoprolol succinate ER (TOPROL XL) 50 MG 24 hr tablet Take 1 tablet by mouth daily       montelukast (SINGULAIR) 10 MG tablet Take 10 mg by mouth at bedtime       REVLIMID 5 MG CAPS capsule Take 5 mg by mouth daily       rosuvastatin (CRESTOR) 10 MG tablet Take 10 mg  by mouth daily       senna-docusate (SENNA S) 8.6-50 MG tablet Take 2 tablets by mouth daily as needed for constipation 60 tablet 2     sildenafil (REVATIO) 20 MG tablet Take 1 tab on empty stomach one hour prior to sexual activity; may increase to a max of 5 tabs       timolol maleate (TIMOPTIC) 0.5 % ophthalmic solution Place 1 drop into both eyes 2 times daily       triamcinolone (KENALOG) 0.1 % external cream Apply topically 2 times daily 453.6 g 0     amLODIPine (NORVASC) 10 MG tablet Take 1 tablet by mouth daily (Patient not taking: Reported on 2024)       dexAMETHasone (DECADRON) 0.5 MG/5ML elixir Take 0.1 mg by mouth daily (Patient not taking: Reported on 2024)       ondansetron (ZOFRAN) 8 MG tablet Take 8 mg by mouth every 8 hours as needed (Patient not taking: Reported on 2024)       prochlorperazine (COMPAZINE) 10 MG tablet Take 10 mg by mouth every 4 hours as needed (Patient not taking: Reported on 2024)         Allergies  Allergies   Allergen Reactions     Benazepril Other (See Comments)     Critical     Penicillin V Rash       Review of systems  A complete ROS was performed and was negative except as mentioned in HPI    Physical Exam  Vitals:    24 1141   BP: 133/62   Pulse: 72   Resp: 16   Temp: 98.5  F (36.9  C)   TempSrc: Oral   SpO2: 99%   Weight: 88.5 kg (195 lb)       Wt Readings from Last 3 Encounters:   24 88.5 kg (195 lb)   24 90.7 kg (200 lb)   24 90.9 kg (200 lb 8 oz)     ECO   male sitting in chair in NAD  HEET: NC/AT, EOMI w/ PERRL, anicteric sclera. MMM. No mouth sores.   Neck: supple no JVP  CV: normal S1,S2 with RRR no m/r/g  Resp: good air movement b/l. No wheezes or crackles  Abd: soft, NTND, no organomegaly or masses. BS normoactive.   Ext: WWP no edema or cyanosis  Neuro: A&Ox4, no lateralizing sx. Grossly nonfocal. Strength appears preserved.   Lymph: No cervical, supraclavicular, axillary or inguinal LAD  Skin: no concerning  "lesions or rashes or petechiae    Labs and Imaging    Sodium   Date Value Ref Range Status   09/25/2024 139 135 - 145 mmol/L Final    ,   Potassium   Date Value Ref Range Status   09/25/2024 4.1 3.4 - 5.3 mmol/L Final      Creatinine   Date Value Ref Range Status   09/25/2024 0.81 0.67 - 1.17 mg/dL Final       No results found for: \"LDH\"      Uric Acid   Date Value Ref Range Status   09/25/2024 3.9 3.4 - 7.0 mg/dL Final       WBC Count   Date Value Ref Range Status   09/25/2024 1.9 (L) 4.0 - 11.0 10e3/uL Final   ,   Hemoglobin   Date Value Ref Range Status   09/25/2024 7.6 (L) 13.3 - 17.7 g/dL Final   ],   Platelet Count   Date Value Ref Range Status   09/25/2024 117 (L) 150 - 450 10e3/uL Final   ,    MCV   Date Value Ref Range Status   09/25/2024 85 78 - 100 fL Final         EPO level was 1213 on 9/2023.        Bone marrow biopsy 7/19/2023  FINAL DIAGNOSIS   Peripheral blood, bone marrow aspirate, biopsy and clot sections ([id number]; 07/19/2023):     1. Myelodysplastic syndrome with isolated del(5q).     2. Two small monotypic B-cell populations (3% and 11% of total events) of uncertain significance, reportedly   detected by flow cytometric analysis. See comment.     COMMENT   The significance of the small monotypic B-cell populations identified by flow cytometric analysis is uncertain, as   diagnostic features of lymphoma are not seen by morphology or demonstrated by immunohistochemistry. These findings may   represent monoclonal B-cell lymphocytosis, although minimal bone marrow involvement by B-cell lymphoma cannot be   excluded. Clinical and radiographic correlation is recommended.  A lymph node or other extramedullary tissue   biopsy is suggested if lymphoma is clinically and/or radiographically suspected.   Cytogenetics 5q deletion.   NGS BCORL1 VAF 3%      Bone marrow 2/21/2024  Peripheral blood, bone marrow aspirate, touch imprint, core biopsy, and clot:     -  Pancytopenia.     -  Inadequate bone " marrow aspirate, core biopsy and clot sections.      -  The flow cytometry ([id number]) of the bone marrow shows approximately 3% monotypic B-cells positive for CD5, CD19, CD20, CD23,  and lambda, approximately 14% monotypic B-cells positive for CD5 (variable), CD19, CD20 (dim to negative), CD23 (dim), CD38 (parital) and , with no definitive light chain expression, and no increased blast population identified.   The marrow aspirate is hemodilute. The clot sections show no marrow tissue. The biopsy core shows a tiny area (<5% core sections) with a cellular (~10%) bone marrow showing tri-lineage cells including clusters of small hypolobated megakaryocytes. The immunohistochemistry on the biopsy sections appears to show increased megakaryocytes and no definitive diagnostic features of lymphoma involvement. The presence of increased small hypolobated megakaryocytes suggests persistent disease of patient's known MDS with 5q deletion. Chromosome studies show that although 20 metaphases are routinely examined, only 1 was identified and appeared normal.   The significance of the two populations of monotypic B-cells identified by flow cytometry is uncertain, as diagnostic features of lymphoma are not seen by morphology or immunohistochemistry in this inadequate marrow biopsy specimen. These findings may represent monoclonal B-cell lymphocytosis of undetermined significance or involvement of the peripheral blood by a B-cell lymphoma, although bone marrow involvement by B-cell lymphoma cannot be excluded.     Assessment and Plan  Mr. Willie Charles is a 64 year old male who is here for further evaluation and/or management of MDS.    Oncology:  MDS with 5q deletion  WHO Classification: MDS w low blasts and 5q deletion  Date of diagnosis: 7/19/2023  Revised IPSS score: 2.5 Low  Molecular IPSS: -0.42 Moderate low  Bone Marrow Blast %: 2  Cytogenetics: 5q del  Molecular: BCORL1 VAF 3%  Past Treatment: lenalidomide  5mg on 8/11/2023 to 9/2023 with addition of azacitidine since 8/28/2023 until 10/2023.   Current Treatment: Restarted lenalidomide 2.5mg twice a week for 3 weeks on 1 week off since 11/2023. Off since 3/12. Last cycle lenalidomide 10mg starting 8/28. Next cycle lenalidomide 10mg on 9/30.     Had an admission 4/18 from 4/22 and stopped lenalidomide on 4/17. Completed 3 weeks on and 1 week of of lenalidomide and restarted on 6/1 for a new cycle.     I discussed the pathophysiology and natural history of MDS with Mr. Willie MACE Melvin today. We went over the current prognostic models and scoring systems that are used in clinical practice as well as the potential for disease evolution into acute myeloid leukemia. We went over the current FDA approved treatments for myelodysplastic syndromes and covered that the only curative option is through an allogeneic hematopoietic cell transplantation. His disease is  Moderate low  risk and currently BMT is not recommended.     We discussed that MDS with 5 q. responds well to lenalidomide and that he is not received adequate amount of treatment with lenalidomide.  Given that he is tolerating lenalidomide at this very low minimal dose I recommended that he increase the lenalidomide to 2.5 mg 5 times a week for 3 weeks on and then 1 week off.  If he continues to tolerate this well we can continue increasing the dose to eventually reach 10 mg 3 weeks on and 1 week off.  He can possibly be started on other agents if his anemia is not responding to lenalidomide including agents like Luspatercept.    He had a recent admission for neutropenic fever s/p antibiotics without blood cultures showing anything.  He did have diffuse ST elevations concerning for stefan/pericarditis.  Course was complicated by A-fib with RVR.  Not sure if the troponin leak was secondary to the RVR.  Though there are rare cases of arrhythmias with lenalidomide doubt if this was related to it.    He has now tolerated a  full cycle of lenalidomide 2.5mg daily for 3 weeks and 1 week off.  We increased his lenalidomide to 5mg he has tolerated for 2 cycles with minimal rash that disappeared on its own without any treatment.     He tolerated the lenalidomide 10 mg for 3 weeks on and 1 week off starting 8/28. He did not have significant cytopenias with increased dose. His ANC today is 0.4 . We will start next cycle on 9/30. He will send me a message ~day 10 to let me know how he is tolerating the lenalidomide to decide if he should STOP lenalidomide at day 14 and do 2 weeks off vs continue for 21 days and also determine whether to dose reduce lenalidomide to 7.5mg for next cycle.      His ANC has been slowly improving and he has not had any infections. If ANC continues to be below 0.5 for more than 7 days and he continues to have recurrent infections then we can consider prophylactic antibiotics.     We will plan to pursue a bone marrow biopsy after 2 cycles of 10mg lenalidomide if no response is seen.     #Elevated ferritin 1676  - STOPPED Deferasirox.   - He has very minimal elevated ferritin and without quantification of liver iron stores would not start a chelating agent. Especially deferasirox which can cause agranulocytosis and liver function issues and kidney issues.   - His rash has now resolved since he stopped the deferasirix.     Plan:  - c/w lenalidomide to 10mg q day for 3 weeks on and 1 wek off starting 9/30.  - RTC with me on 10/25 as video visit.   - c/w twice weekly labs and as needed transfusions at local oncologist.  - If ANC trends below 500 consistently for more than 1 week can start levofloxacin as prophylaxis.     Hematology:  Anemia/Thrombocytopenia:   Transfuse leukocyte reduced and irradiated blood products: 1 unit pRBC if hgb is 7.0-7.9, 2 units pRBCs if Hgb 6.0-6.9 g/dl, 1 unit platelets if PLT count is <20,000 or <50,000 with clinical bleeding.    Immunocompromised:  As a result of his disease and/or previous  treatment. Mr. Willie Charles is immunocompromised. his last ANC is >500 and there is no need for prophylactic antibiotics at this time.     Cardiac:  Mr. Willie Charles has no history of heart disease.     Renal:  Creatinine results reviewed today. Mr. Willie Charles does not have any renal disease.    Hepatic:  Liver function tests reviewed today.    Psychosocial:  Mr. Willie Charles is coping well with his diagnosis.     All other questions and concerns were addressed and answered to Mr. Willie Charles's satisfaction.    Today, I spent a total of 40 minutes of face-to-face and non face-to-face time with Mr. Willie Charles. Time spent included review and discussion of diagnostic test results, patient counseling, and coordination of care.    The longitudinal plan of care for the diagnosis(es)/condition(s) as documented were addressed during this visit. Due to the added complexity in care, I will continue to support Willie in the subsequent management and with ongoing continuity of care.    Haroon Ellis MD    Division of Hematology, Oncology and Transplantation  [facility]  P: [phone]      Again, thank you for allowing me to participate in the care of your patient.        Sincerely,        Haroon Ellis MD

## 2024-09-25 NOTE — NURSING NOTE
"Oncology Rooming Note    September 25, 2024 11:57 AM   Willie Charles is a 64 year old male who presents for:    Chief Complaint   Patient presents with    Blood Draw     Labs drawn via  by RN in lab.  VS taken    Oncology Clinic Visit     Myelodysplastic syndrome     Initial Vitals: /62   Pulse 72   Temp 98.5  F (36.9  C) (Oral)   Resp 16   Wt 88.5 kg (195 lb)   SpO2 99%   BMI 29.65 kg/m   Estimated body mass index is 29.65 kg/m  as calculated from the following:    Height as of 8/20/24: 1.727 m (5' 8\").    Weight as of this encounter: 88.5 kg (195 lb). Body surface area is 2.06 meters squared.  No Pain (0) Comment: Data Unavailable   No LMP for male patient.  Allergies reviewed: Yes  Medications reviewed: Yes    Medications: Medication refills not needed today.  Pharmacy name entered into [employer] [id number] - [place], [place] - [address]    Frailty Screening:   Is the patient here for a new oncology consult visit in cancer care? 2. No      Clinical concerns: Wondering about dexamethasone- he is not taking it and was not aware of it being on medication list       Toyin Goff              "

## 2024-10-23 DIAGNOSIS — K59.00 CONSTIPATION, UNSPECIFIED CONSTIPATION TYPE: ICD-10-CM

## 2024-10-23 RX ORDER — DOCUSATE SODIUM 50 MG AND SENNOSIDES 8.6 MG 8.6; 5 MG/1; MG/1
2 TABLET, FILM COATED ORAL DAILY PRN
Qty: 200 TABLET | Refills: 0 | Status: SHIPPED | OUTPATIENT
Start: 2024-10-23

## 2024-10-23 NOTE — TELEPHONE ENCOUNTER
Senoxot Refill   Last prescribing provider: DR Ellis     Last clinic visit date: 9/25/24 DR Ellis     Recommendations for requested medication (if none, N/A): Copied from chart note   senna-docusate (SENNA S) 8.6-50 MG tablet Take 2 tablets by mouth daily as needed for constipation 60 tablet 2     Any other pertinent information (if none, N/A): N/A    Refilled: Y/N, if NO, why?

## 2024-10-30 ENCOUNTER — VIRTUAL VISIT (OUTPATIENT)
Dept: ONCOLOGY | Facility: CLINIC | Age: 64
End: 2024-10-30
Attending: STUDENT IN AN ORGANIZED HEALTH CARE EDUCATION/TRAINING PROGRAM
Payer: COMMERCIAL

## 2024-10-30 VITALS — HEIGHT: 68 IN | WEIGHT: 195 LBS | BODY MASS INDEX: 29.55 KG/M2

## 2024-10-30 DIAGNOSIS — D46.9 MDS (MYELODYSPLASTIC SYNDROME) (H): Primary | ICD-10-CM

## 2024-10-30 PROCEDURE — 99215 OFFICE O/P EST HI 40 MIN: CPT | Mod: 95 | Performed by: STUDENT IN AN ORGANIZED HEALTH CARE EDUCATION/TRAINING PROGRAM

## 2024-10-30 PROCEDURE — G2211 COMPLEX E/M VISIT ADD ON: HCPCS | Mod: 95 | Performed by: STUDENT IN AN ORGANIZED HEALTH CARE EDUCATION/TRAINING PROGRAM

## 2024-10-30 ASSESSMENT — PAIN SCALES - GENERAL: PAINLEVEL_OUTOF10: NO PAIN (0)

## 2024-10-30 NOTE — NURSING NOTE
Current patient location: [address] [place] [id number]    Is the patient currently in the state of [place]? YES    Visit mode:VIDEO    If the visit is dropped, the patient can be reconnected by: VIDEO VISIT: Text to cell phone:   Telephone Information:   Mobile [phone]       Will anyone else be joining the visit? NO  (If patient encounters technical issues they should call [phone]    Are changes needed to the allergy or medication list? Pt stated no changes to allergies and Pt stated no med changes    Are refills needed on medications prescribed by this physician? NO    Rooming Documentation:  Questionnaire completed    Reason for visit: RECHDIANE LOPEZ

## 2024-10-30 NOTE — PROGRESS NOTES
Virtual Visit Details    Type of service:  Video Visit   Video Start Time:  1:34  Video End Time: 2:02    Originating Location (pt. Location): Home    Distant Location (provider location):  On-site  Platform used for Video Visit: [facility]  HEMATOLOGY & ONCOLOGY  FOLLOW UP VISIT    PATIENT NAME: Willie Charles          MRN # [id number]  YOB: 1960  DATE OF VISIT:  Oct 30, 2024            REFERRING PROVIDER:   Mr. Willie Charles was seen at the request of Arely for further evaluation and/or management.     History of Presenting Illness  Mr. Willie Charles is a pleasant 63 year old male with a past medical history significant for hyperthyroisidism s/p radioactive iodine, HTN  and MDS dx in 7/2023 after he had fatigue for several months found to have bicytopenia with WBC 3.3 and hemoglobin 7.0 and platelets 308 that led to a bone marrow biopsy on 7/19/2023 that showed hypocellular marrow with 10 to 20% cellularity and 2% blasts with megakaryocytic hyperplasia with dysplasia.  Cytogenetics showed 5 q. deletion and NGS showed BCORL1 with a VAF of 3%.  He was started on lenalidomide 5 mg daily on 8/11/2023.  His anemia worsened after a few days and he was then started on concurrent azacitidine in addition to Revlimid on 8/28/2023.  As per records his counts trended down by 9/2023 and his Revlimid was discontinued.  He was continued on 2 more cycles of azacitidine until October 2023.  He did see Dr. Squires at [place] in 11/2023 and was recommended to restart Revlimid starting at 2.5 mg dosing every other day for 3 weeks on and 1 week off.  He was started on 2.5 mg 2 times a week  and has been tolerating that for the last 3 months.He has continued to require about 2-4 prbcs in a month.  Denies any fevers or chills or ongoing bleeding from anywhere.    He has now established with the [facility] and has now been increased to lenalidomide 5mg daily for 3 weeks on 1 week off.      Subjective  Patient is on video alone. He completed another cycle of lenalidomide 10mg daily for 3 weeks on and 1 week off.  Tolerated this well, no rashes or brain fog as before. Still needed 4 rounds of transfusion.  Denies any bleeding from anywhere including BRBPR/Melena.     Past Medical History  No past medical history on file.    Family History  No family history on file.    Social History  Smoking: Never  Alcohol use: occasionally drinks alcohol  Status:   Children/grandchildren: Did not ask.   Occupation: Housing  , .     [employer]  Current Outpatient Medications  Current Outpatient Medications   Medication Sig Dispense Refill    acyclovir (ZOVIRAX) 400 MG tablet Take 400 mg by mouth 2 times daily      amLODIPine (NORVASC) 10 MG tablet Take 1 tablet by mouth daily (Patient not taking: Reported on 9/25/2024)      aspirin 81 MG EC tablet Take 81 mg by mouth daily      cyanocobalamin (VITAMIN B-12) 1000 MCG tablet Take 1,000 mcg by mouth daily      dexAMETHasone (DECADRON) 0.5 MG/5ML elixir Take 0.1 mg by mouth daily (Patient not taking: Reported on 9/25/2024)      diltiazem ER COATED BEADS (CARDIZEM CD/CARTIA XT) 180 MG 24 hr capsule Take 180 mg by mouth daily      fluticasone (FLONASE) 50 MCG/ACT nasal spray Spray 1 spray into both nostrils daily      folic acid (FOLVITE) 1 MG tablet Take 1 mg by mouth daily      levothyroxine (SYNTHROID/LEVOTHROID) 150 MCG tablet TAKE ONE TABLET BY MOUTH ONCE DAILY. DO NOT TAKE WITH IRON,ALUMINUM,MAGNESIUM,OR CALCIUM      loratadine (CLARITIN) 10 MG tablet Take 10 mg by mouth daily      metFORMIN (GLUCOPHAGE XR) 500 MG 24 hr tablet Take 500 mg by mouth daily (with dinner)      metoprolol succinate ER (TOPROL XL) 50 MG 24 hr tablet Take 1 tablet by mouth daily      montelukast (SINGULAIR) 10 MG tablet Take 10 mg by mouth at bedtime      ondansetron (ZOFRAN) 8 MG tablet Take 8 mg by mouth every 8 hours as needed (Patient not taking: Reported  "on 2024)      prochlorperazine (COMPAZINE) 10 MG tablet Take 10 mg by mouth every 4 hours as needed (Patient not taking: Reported on 2024)      REVLIMID 5 MG CAPS capsule Take 5 mg by mouth daily      rosuvastatin (CRESTOR) 10 MG tablet Take 10 mg by mouth daily      sildenafil (REVATIO) 20 MG tablet Take 1 tab on empty stomach one hour prior to sexual activity; may increase to a max of 5 tabs      STIMULANT LAXATIVE 8.6-50 MG tablet Take 2 tablets by mouth daily as needed for constipation 200 tablet 0    timolol maleate (TIMOPTIC) 0.5 % ophthalmic solution Place 1 drop into both eyes 2 times daily      triamcinolone (KENALOG) 0.1 % external cream Apply topically 2 times daily 453.6 g 0       Allergies  Allergies   Allergen Reactions    Benazepril Other (See Comments)     Critical    Penicillin V Rash       Review of systems  A complete ROS was performed and was negative except as mentioned in HPI    Physical Exam  Vitals:    10/30/24 1302   Weight: 88.5 kg (195 lb)   Height: 1.727 m (5' 8\")       Wt Readings from Last 3 Encounters:   10/30/24 88.5 kg (195 lb)   24 88.5 kg (195 lb)   24 90.7 kg (200 lb)     ECO   male sitting in chair in NAD  Video visit limited exam.   Sitting comfortably and breathing with no acceszo    Labs and Imaging    Sodium   Date Value Ref Range Status   2024 139 135 - 145 mmol/L Final    ,   Potassium   Date Value Ref Range Status   2024 4.1 3.4 - 5.3 mmol/L Final      Creatinine   Date Value Ref Range Status   2024 0.81 0.67 - 1.17 mg/dL Final       No results found for: \"LDH\"      Uric Acid   Date Value Ref Range Status   2024 3.9 3.4 - 7.0 mg/dL Final       WBC Count   Date Value Ref Range Status   2024 1.9 (L) 4.0 - 11.0 10e3/uL Final   ,   Hemoglobin   Date Value Ref Range Status   2024 7.6 (L) 13.3 - 17.7 g/dL Final   ],   Platelet Count   Date Value Ref Range Status   2024 117 (L) 150 - 450 10e3/uL Final "   ,    MCV   Date Value Ref Range Status   09/25/2024 85 78 - 100 fL Final         EPO level was 1213 on 9/2023.        Bone marrow biopsy 7/19/2023  FINAL DIAGNOSIS   Peripheral blood, bone marrow aspirate, biopsy and clot sections ([id number]; 07/19/2023):     1. Myelodysplastic syndrome with isolated del(5q).     2. Two small monotypic B-cell populations (3% and 11% of total events) of uncertain significance, reportedly   detected by flow cytometric analysis. See comment.     COMMENT   The significance of the small monotypic B-cell populations identified by flow cytometric analysis is uncertain, as   diagnostic features of lymphoma are not seen by morphology or demonstrated by immunohistochemistry. These findings may   represent monoclonal B-cell lymphocytosis, although minimal bone marrow involvement by B-cell lymphoma cannot be   excluded. Clinical and radiographic correlation is recommended.  A lymph node or other extramedullary tissue   biopsy is suggested if lymphoma is clinically and/or radiographically suspected.   Cytogenetics 5q deletion.   NGS BCORL1 VAF 3%      Bone marrow 2/21/2024  Peripheral blood, bone marrow aspirate, touch imprint, core biopsy, and clot:     -  Pancytopenia.     -  Inadequate bone marrow aspirate, core biopsy and clot sections.      -  The flow cytometry ([id number]) of the bone marrow shows approximately 3% monotypic B-cells positive for CD5, CD19, CD20, CD23,  and lambda, approximately 14% monotypic B-cells positive for CD5 (variable), CD19, CD20 (dim to negative), CD23 (dim), CD38 (parital) and , with no definitive light chain expression, and no increased blast population identified.   The marrow aspirate is hemodilute. The clot sections show no marrow tissue. The biopsy core shows a tiny area (<5% core sections) with a cellular (~10%) bone marrow showing tri-lineage cells including clusters of small hypolobated megakaryocytes. The immunohistochemistry on the  biopsy sections appears to show increased megakaryocytes and no definitive diagnostic features of lymphoma involvement. The presence of increased small hypolobated megakaryocytes suggests persistent disease of patient's known MDS with 5q deletion. Chromosome studies show that although 20 metaphases are routinely examined, only 1 was identified and appeared normal.   The significance of the two populations of monotypic B-cells identified by flow cytometry is uncertain, as diagnostic features of lymphoma are not seen by morphology or immunohistochemistry in this inadequate marrow biopsy specimen. These findings may represent monoclonal B-cell lymphocytosis of undetermined significance or involvement of the peripheral blood by a B-cell lymphoma, although bone marrow involvement by B-cell lymphoma cannot be excluded.     Assessment and Plan  Mr. Willie Charles is a 64 year old male who is here for further evaluation and/or management of MDS.    Oncology:  MDS with 5q deletion  WHO Classification: MDS w low blasts and 5q deletion  Date of diagnosis: 7/19/2023  Revised IPSS score: 2.5 Low  Molecular IPSS: -0.42 Moderate low  Bone Marrow Blast %: 2  Cytogenetics: 5q del  Molecular: BCORL1 VAF 3%  Past Treatment: lenalidomide 5mg on 8/11/2023 to 9/2023 with addition of azacitidine since 8/28/2023 until 10/2023.   Current Treatment: Restarted lenalidomide 2.5mg twice a week for 3 weeks on 1 week off since 11/2023. Off since 3/12. Last cycle lenalidomide 10mg starting 8/28. Next cycle lenalidomide 10mg on 9/30.     Had an admission 4/18 from 4/22 and stopped lenalidomide on 4/17. Completed 3 weeks on and 1 week of of lenalidomide and restarted on 6/1 for a new cycle.     I discussed the pathophysiology and natural history of MDS with Mr. Willie Charles today. We went over the current prognostic models and scoring systems that are used in clinical practice as well as the potential for disease evolution into acute myeloid  leukemia. We went over the current FDA approved treatments for myelodysplastic syndromes and covered that the only curative option is through an allogeneic hematopoietic cell transplantation. His disease is  Moderate low  risk and currently BMT is not recommended.     We discussed that MDS with 5 q. responds well to lenalidomide and that he is not received adequate amount of treatment with lenalidomide.  Given that he is tolerating lenalidomide at this very low minimal dose I recommended that he increase the lenalidomide to 2.5 mg 5 times a week for 3 weeks on and then 1 week off.  If he continues to tolerate this well we can continue increasing the dose to eventually reach 10 mg 3 weeks on and 1 week off.  He can possibly be started on other agents if his anemia is not responding to lenalidomide including agents like Luspatercept.    He had a recent admission for neutropenic fever s/p antibiotics without blood cultures showing anything.  He did have diffuse ST elevations concerning for stefan/pericarditis.  Course was complicated by A-fib with RVR.  Not sure if the troponin leak was secondary to the RVR.  Though there are rare cases of arrhythmias with lenalidomide doubt if this was related to it.    He has now tolerated a full cycle of lenalidomide 2.5mg daily for 3 weeks and 1 week off.  We increased his lenalidomide to 5mg he has tolerated for 2 cycles with minimal rash that disappeared on its own without any treatment.     He tolerated the lenalidomide 10 mg for 3 weeks on and 1 week off starting 8/28. He has now finished another cycle that started on 9/30. He will restart next cycle on 11/4 and he will see me on 11/29 to plan a bone marrow biopsy likely after one more cycle if he continuing to tolerate it well. Last ANC was 800 on 10/28 and he has not had any infectious complications.     If ANC continues to be below 0.5 for more than 7 days and he continues to have recurrent infections then we can consider  prophylactic antibiotics.     We will plan to pursue a bone marrow biopsy after 2 cycles of 10mg lenalidomide if no response is seen.     #Elevated ferritin 1676  - STOPPED Deferasirox.   - He has very minimal elevated ferritin and without quantification of liver iron stores would not start a chelating agent. Especially deferasirox which can cause agranulocytosis and liver function issues and kidney issues.   - His rash has now resolved since he stopped the deferasirix.     Plan:  - c/w lenalidomide to 10mg q day for 3 weeks on and 2 wek off starting 11/04.  - RTC with me on 11/29 as video visit.   - c/w twice weekly labs and as needed transfusions at local oncologist.  - If ANC trends below 500 consistently for more than 1 week can start levofloxacin as prophylaxis.     Hematology:  Anemia/Thrombocytopenia:   Transfuse leukocyte reduced and irradiated blood products: 1 unit pRBC if hgb is 7.0-7.9, 2 units pRBCs if Hgb 6.0-6.9 g/dl, 1 unit platelets if PLT count is <20,000 or <50,000 with clinical bleeding.    Immunocompromised:  As a result of his disease and/or previous treatment. Mr. Willie Charles is immunocompromised. his last ANC is >500 and there is no need for prophylactic antibiotics at this time.     Cardiac:  Mr. Willie Charles has no history of heart disease.     Renal:  Creatinine results reviewed today. Mr. Willie Charles does not have any renal disease.    Hepatic:  Liver function tests reviewed today.    Psychosocial:  Mr. Willie Charles is coping well with his diagnosis.     All other questions and concerns were addressed and answered to Mr. Willie Charles's satisfaction.    Today, I spent a total of 60 minutes of face-to-face and non face-to-face time with Mr. Willie Charles. Time spent included review and discussion of diagnostic test results, patient counseling, and coordination of care.    The longitudinal plan of care for the diagnosis(es)/condition(s) as documented were  addressed during this visit. Due to the added complexity in care, I will continue to support Willie in the subsequent management and with ongoing continuity of care.    Haroon Ellis MD    Division of Hematology, Oncology and Transplantation  [facility]  P: [phone]

## 2024-10-30 NOTE — LETTER
10/30/2024      Willie Charles  [address]  [place] [address]      Dear Colleague,    Thank you for referring your patient, Willie Chrales, to the [facility]. Please see a copy of my visit note below.    Virtual Visit Details    Type of service:  Video Visit   Video Start Time:  1:34  Video End Time: 2:02    Originating Location (pt. Location): Home    Distant Location (provider location):  On-site  Platform used for Video Visit: [facility]  HEMATOLOGY & ONCOLOGY  FOLLOW UP VISIT    PATIENT NAME: Willie Charles          MRN # [id number]  YOB: 1960  DATE OF VISIT:  Oct 30, 2024            REFERRING PROVIDER:   Mr. Willie Charles was seen at the request of Arely for further evaluation and/or management.     History of Presenting Illness  Mr. Willie Charles is a pleasant 63 year old male with a past medical history significant for hyperthyroisidism s/p radioactive iodine, HTN  and MDS dx in 7/2023 after he had fatigue for several months found to have bicytopenia with WBC 3.3 and hemoglobin 7.0 and platelets 308 that led to a bone marrow biopsy on 7/19/2023 that showed hypocellular marrow with 10 to 20% cellularity and 2% blasts with megakaryocytic hyperplasia with dysplasia.  Cytogenetics showed 5 q. deletion and NGS showed BCORL1 with a VAF of 3%.  He was started on lenalidomide 5 mg daily on 8/11/2023.  His anemia worsened after a few days and he was then started on concurrent azacitidine in addition to Revlimid on 8/28/2023.  As per records his counts trended down by 9/2023 and his Revlimid was discontinued.  He was continued on 2 more cycles of azacitidine until October 2023.  He did see Dr. Squires at [place] in 11/2023 and was recommended to restart Revlimid starting at 2.5 mg dosing every other day for 3 weeks on and 1 week off.  He was started on 2.5 mg 2 times a week  and has been tolerating that for the last 3 months.He has  continued to require about 2-4 prbcs in a month.  Denies any fevers or chills or ongoing bleeding from anywhere.    He has now established with the [facility] and has now been increased to lenalidomide 5mg daily for 3 weeks on 1 week off.     Subjective  Patient is on video alone. He completed another cycle of lenalidomide 10mg daily for 3 weeks on and 1 week off.  Tolerated this well, no rashes or brain fog as before. Still needed 4 rounds of transfusion.  Denies any bleeding from anywhere including BRBPR/Melena.     Past Medical History  No past medical history on file.    Family History  No family history on file.    Social History  Smoking: Never  Alcohol use: occasionally drinks alcohol  Status:   Children/grandchildren: Did not ask.   Occupation: Housing  , .     [employer]  Current Outpatient Medications  Current Outpatient Medications   Medication Sig Dispense Refill     acyclovir (ZOVIRAX) 400 MG tablet Take 400 mg by mouth 2 times daily       amLODIPine (NORVASC) 10 MG tablet Take 1 tablet by mouth daily (Patient not taking: Reported on 9/25/2024)       aspirin 81 MG EC tablet Take 81 mg by mouth daily       cyanocobalamin (VITAMIN B-12) 1000 MCG tablet Take 1,000 mcg by mouth daily       dexAMETHasone (DECADRON) 0.5 MG/5ML elixir Take 0.1 mg by mouth daily (Patient not taking: Reported on 9/25/2024)       diltiazem ER COATED BEADS (CARDIZEM CD/CARTIA XT) 180 MG 24 hr capsule Take 180 mg by mouth daily       fluticasone (FLONASE) 50 MCG/ACT nasal spray Spray 1 spray into both nostrils daily       folic acid (FOLVITE) 1 MG tablet Take 1 mg by mouth daily       levothyroxine (SYNTHROID/LEVOTHROID) 150 MCG tablet TAKE ONE TABLET BY MOUTH ONCE DAILY. DO NOT TAKE WITH IRON,ALUMINUM,MAGNESIUM,OR CALCIUM       loratadine (CLARITIN) 10 MG tablet Take 10 mg by mouth daily       metFORMIN (GLUCOPHAGE XR) 500 MG 24 hr tablet Take 500 mg by mouth daily (with dinner)       metoprolol  "succinate ER (TOPROL XL) 50 MG 24 hr tablet Take 1 tablet by mouth daily       montelukast (SINGULAIR) 10 MG tablet Take 10 mg by mouth at bedtime       ondansetron (ZOFRAN) 8 MG tablet Take 8 mg by mouth every 8 hours as needed (Patient not taking: Reported on 2024)       prochlorperazine (COMPAZINE) 10 MG tablet Take 10 mg by mouth every 4 hours as needed (Patient not taking: Reported on 2024)       REVLIMID 5 MG CAPS capsule Take 5 mg by mouth daily       rosuvastatin (CRESTOR) 10 MG tablet Take 10 mg by mouth daily       sildenafil (REVATIO) 20 MG tablet Take 1 tab on empty stomach one hour prior to sexual activity; may increase to a max of 5 tabs       STIMULANT LAXATIVE 8.6-50 MG tablet Take 2 tablets by mouth daily as needed for constipation 200 tablet 0     timolol maleate (TIMOPTIC) 0.5 % ophthalmic solution Place 1 drop into both eyes 2 times daily       triamcinolone (KENALOG) 0.1 % external cream Apply topically 2 times daily 453.6 g 0       Allergies  Allergies   Allergen Reactions     Benazepril Other (See Comments)     Critical     Penicillin V Rash       Review of systems  A complete ROS was performed and was negative except as mentioned in HPI    Physical Exam  Vitals:    10/30/24 1302   Weight: 88.5 kg (195 lb)   Height: 1.727 m (5' 8\")       Wt Readings from Last 3 Encounters:   10/30/24 88.5 kg (195 lb)   24 88.5 kg (195 lb)   24 90.7 kg (200 lb)     ECO   male sitting in chair in NAD  Video visit limited exam.   Sitting comfortably and breathing with no acceszo    Labs and Imaging    Sodium   Date Value Ref Range Status   2024 139 135 - 145 mmol/L Final    ,   Potassium   Date Value Ref Range Status   2024 4.1 3.4 - 5.3 mmol/L Final      Creatinine   Date Value Ref Range Status   2024 0.81 0.67 - 1.17 mg/dL Final       No results found for: \"LDH\"      Uric Acid   Date Value Ref Range Status   2024 3.9 3.4 - 7.0 mg/dL Final       WBC " Count   Date Value Ref Range Status   09/25/2024 1.9 (L) 4.0 - 11.0 10e3/uL Final   ,   Hemoglobin   Date Value Ref Range Status   09/25/2024 7.6 (L) 13.3 - 17.7 g/dL Final   ],   Platelet Count   Date Value Ref Range Status   09/25/2024 117 (L) 150 - 450 10e3/uL Final   ,    MCV   Date Value Ref Range Status   09/25/2024 85 78 - 100 fL Final         EPO level was 1213 on 9/2023.        Bone marrow biopsy 7/19/2023  FINAL DIAGNOSIS   Peripheral blood, bone marrow aspirate, biopsy and clot sections ([id number]; 07/19/2023):     1. Myelodysplastic syndrome with isolated del(5q).     2. Two small monotypic B-cell populations (3% and 11% of total events) of uncertain significance, reportedly   detected by flow cytometric analysis. See comment.     COMMENT   The significance of the small monotypic B-cell populations identified by flow cytometric analysis is uncertain, as   diagnostic features of lymphoma are not seen by morphology or demonstrated by immunohistochemistry. These findings may   represent monoclonal B-cell lymphocytosis, although minimal bone marrow involvement by B-cell lymphoma cannot be   excluded. Clinical and radiographic correlation is recommended.  A lymph node or other extramedullary tissue   biopsy is suggested if lymphoma is clinically and/or radiographically suspected.   Cytogenetics 5q deletion.   NGS BCORL1 VAF 3%      Bone marrow 2/21/2024  Peripheral blood, bone marrow aspirate, touch imprint, core biopsy, and clot:     -  Pancytopenia.     -  Inadequate bone marrow aspirate, core biopsy and clot sections.      -  The flow cytometry ([id number]) of the bone marrow shows approximately 3% monotypic B-cells positive for CD5, CD19, CD20, CD23,  and lambda, approximately 14% monotypic B-cells positive for CD5 (variable), CD19, CD20 (dim to negative), CD23 (dim), CD38 (parital) and , with no definitive light chain expression, and no increased blast population identified.   The  marrow aspirate is hemodilute. The clot sections show no marrow tissue. The biopsy core shows a tiny area (<5% core sections) with a cellular (~10%) bone marrow showing tri-lineage cells including clusters of small hypolobated megakaryocytes. The immunohistochemistry on the biopsy sections appears to show increased megakaryocytes and no definitive diagnostic features of lymphoma involvement. The presence of increased small hypolobated megakaryocytes suggests persistent disease of patient's known MDS with 5q deletion. Chromosome studies show that although 20 metaphases are routinely examined, only 1 was identified and appeared normal.   The significance of the two populations of monotypic B-cells identified by flow cytometry is uncertain, as diagnostic features of lymphoma are not seen by morphology or immunohistochemistry in this inadequate marrow biopsy specimen. These findings may represent monoclonal B-cell lymphocytosis of undetermined significance or involvement of the peripheral blood by a B-cell lymphoma, although bone marrow involvement by B-cell lymphoma cannot be excluded.     Assessment and Plan  Mr. Willie Charles is a 64 year old male who is here for further evaluation and/or management of MDS.    Oncology:  MDS with 5q deletion  WHO Classification: MDS w low blasts and 5q deletion  Date of diagnosis: 7/19/2023  Revised IPSS score: 2.5 Low  Molecular IPSS: -0.42 Moderate low  Bone Marrow Blast %: 2  Cytogenetics: 5q del  Molecular: BCORL1 VAF 3%  Past Treatment: lenalidomide 5mg on 8/11/2023 to 9/2023 with addition of azacitidine since 8/28/2023 until 10/2023.   Current Treatment: Restarted lenalidomide 2.5mg twice a week for 3 weeks on 1 week off since 11/2023. Off since 3/12. Last cycle lenalidomide 10mg starting 8/28. Next cycle lenalidomide 10mg on 9/30.     Had an admission 4/18 from 4/22 and stopped lenalidomide on 4/17. Completed 3 weeks on and 1 week of of lenalidomide and restarted on 6/1  for a new cycle.     I discussed the pathophysiology and natural history of MDS with Mr. Willie Charles today. We went over the current prognostic models and scoring systems that are used in clinical practice as well as the potential for disease evolution into acute myeloid leukemia. We went over the current FDA approved treatments for myelodysplastic syndromes and covered that the only curative option is through an allogeneic hematopoietic cell transplantation. His disease is  Moderate low  risk and currently BMT is not recommended.     We discussed that MDS with 5 q. responds well to lenalidomide and that he is not received adequate amount of treatment with lenalidomide.  Given that he is tolerating lenalidomide at this very low minimal dose I recommended that he increase the lenalidomide to 2.5 mg 5 times a week for 3 weeks on and then 1 week off.  If he continues to tolerate this well we can continue increasing the dose to eventually reach 10 mg 3 weeks on and 1 week off.  He can possibly be started on other agents if his anemia is not responding to lenalidomide including agents like Luspatercept.    He had a recent admission for neutropenic fever s/p antibiotics without blood cultures showing anything.  He did have diffuse ST elevations concerning for stefan/pericarditis.  Course was complicated by A-fib with RVR.  Not sure if the troponin leak was secondary to the RVR.  Though there are rare cases of arrhythmias with lenalidomide doubt if this was related to it.    He has now tolerated a full cycle of lenalidomide 2.5mg daily for 3 weeks and 1 week off.  We increased his lenalidomide to 5mg he has tolerated for 2 cycles with minimal rash that disappeared on its own without any treatment.     He tolerated the lenalidomide 10 mg for 3 weeks on and 1 week off starting 8/28. He has now finished another cycle that started on 9/30. He will restart next cycle on 11/4 and he will see me on 11/29 to plan a bone marrow  biopsy likely after one more cycle if he continuing to tolerate it well. Last ANC was 800 on 10/28 and he has not had any infectious complications.     If ANC continues to be below 0.5 for more than 7 days and he continues to have recurrent infections then we can consider prophylactic antibiotics.     We will plan to pursue a bone marrow biopsy after 2 cycles of 10mg lenalidomide if no response is seen.     #Elevated ferritin 1676  - STOPPED Deferasirox.   - He has very minimal elevated ferritin and without quantification of liver iron stores would not start a chelating agent. Especially deferasirox which can cause agranulocytosis and liver function issues and kidney issues.   - His rash has now resolved since he stopped the deferasirix.     Plan:  - c/w lenalidomide to 10mg q day for 3 weeks on and 2 wek off starting 11/04.  - RTC with me on 11/29 as video visit.   - c/w twice weekly labs and as needed transfusions at local oncologist.  - If ANC trends below 500 consistently for more than 1 week can start levofloxacin as prophylaxis.     Hematology:  Anemia/Thrombocytopenia:   Transfuse leukocyte reduced and irradiated blood products: 1 unit pRBC if hgb is 7.0-7.9, 2 units pRBCs if Hgb 6.0-6.9 g/dl, 1 unit platelets if PLT count is <20,000 or <50,000 with clinical bleeding.    Immunocompromised:  As a result of his disease and/or previous treatment. Mr. Willie Charles is immunocompromised. his last ANC is >500 and there is no need for prophylactic antibiotics at this time.     Cardiac:  Mr. Willie Charles has no history of heart disease.     Renal:  Creatinine results reviewed today. Mr. Willie Charles does not have any renal disease.    Hepatic:  Liver function tests reviewed today.    Psychosocial:  Mr. Willie Charles is coping well with his diagnosis.     All other questions and concerns were addressed and answered to Mr. Willie Charles's satisfaction.    Today, I spent a total of 60 minutes of  face-to-face and non face-to-face time with Mr. Willie MACE Melvin. Time spent included review and discussion of diagnostic test results, patient counseling, and coordination of care.    The longitudinal plan of care for the diagnosis(es)/condition(s) as documented were addressed during this visit. Due to the added complexity in care, I will continue to support Willie in the subsequent management and with ongoing continuity of care.    Haroon Ellis MD    Division of Hematology, Oncology and Transplantation  [facility]  P: [phone]      Again, thank you for allowing me to participate in the care of your patient.        Sincerely,        Haroon Ellis MD

## 2024-11-29 ENCOUNTER — VIRTUAL VISIT (OUTPATIENT)
Dept: ONCOLOGY | Facility: CLINIC | Age: 64
End: 2024-11-29
Attending: STUDENT IN AN ORGANIZED HEALTH CARE EDUCATION/TRAINING PROGRAM
Payer: COMMERCIAL

## 2024-11-29 VITALS — BODY MASS INDEX: 28.64 KG/M2 | WEIGHT: 189 LBS | HEIGHT: 68 IN

## 2024-11-29 DIAGNOSIS — D46.9 MDS (MYELODYSPLASTIC SYNDROME) (H): Primary | ICD-10-CM

## 2024-11-29 PROCEDURE — G2211 COMPLEX E/M VISIT ADD ON: HCPCS | Mod: 95 | Performed by: STUDENT IN AN ORGANIZED HEALTH CARE EDUCATION/TRAINING PROGRAM

## 2024-11-29 PROCEDURE — 99215 OFFICE O/P EST HI 40 MIN: CPT | Mod: 95 | Performed by: STUDENT IN AN ORGANIZED HEALTH CARE EDUCATION/TRAINING PROGRAM

## 2024-11-29 RX ORDER — FOLIC ACID 1 MG/1
1 TABLET ORAL DAILY
Qty: 30 TABLET | Refills: 5 | Status: SHIPPED | OUTPATIENT
Start: 2024-11-29

## 2024-11-29 ASSESSMENT — PAIN SCALES - GENERAL: PAINLEVEL_OUTOF10: NO PAIN (0)

## 2024-11-29 NOTE — NURSING NOTE
Current patient location: [address] [place] [id number]    Is the patient currently in the state of [place]? YES    Visit mode:VIDEO    If the visit is dropped, the patient can be reconnected by:VIDEO VISIT: Text to cell phone:   Telephone Information:   Mobile [phone]       Will anyone else be joining the visit? NO  (If patient encounters technical issues they should call [phone]    Are changes needed to the allergy or medication list? Pt stated no changes to allergies and Pt stated no med changes    Are refills needed on medications prescribed by this physician? NO    Rooming Documentation:  Questionnaire(s) not done per department protocol    Reason for visit: DURAN NAVARRETEF

## 2024-11-29 NOTE — LETTER
11/29/2024      Willie Charles  [address]  [place] [address]      Dear Colleague,    Thank you for referring your patient, Willie Charles, to the [facility]. Please see a copy of my visit note below.    Virtual Visit Details    Type of service:  Video Visit   Video Start Time:  1:30  Video End Time:2:00 PM    Originating Location (pt. Location): Home    Distant Location (provider location):  On-site  Platform used for Video Visit: [facility]  HEMATOLOGY & ONCOLOGY  FOLLOW UP VISIT    PATIENT NAME: Willie Charles          MRN # [id number]  YOB: 1960  DATE OF VISIT:  Nov 29, 2024            REFERRING PROVIDER:   Mr. Willie Charles was seen at the request of Arely for further evaluation and/or management.     History of Presenting Illness  Mr. Willie Charles is a pleasant 63 year old male with a past medical history significant for hyperthyroisidism s/p radioactive iodine, HTN  and MDS dx in 7/2023 after he had fatigue for several months found to have bicytopenia with WBC 3.3 and hemoglobin 7.0 and platelets 308 that led to a bone marrow biopsy on 7/19/2023 that showed hypocellular marrow with 10 to 20% cellularity and 2% blasts with megakaryocytic hyperplasia with dysplasia.  Cytogenetics showed 5 q. deletion and NGS showed BCORL1 with a VAF of 3%.  He was started on lenalidomide 5 mg daily on 8/11/2023.  His anemia worsened after a few days and he was then started on concurrent azacitidine in addition to Revlimid on 8/28/2023.  As per records his counts trended down by 9/2023 and his Revlimid was discontinued.  He was continued on 2 more cycles of azacitidine until October 2023.  He did see Dr. Squires at [place] in 11/2023 and was recommended to restart Revlimid starting at 2.5 mg dosing every other day for 3 weeks on and 1 week off.  He was started on 2.5 mg 2 times a week  and has been tolerating that for the last 3 months.He has  continued to require about 2-4 prbcs in a month.  Denies any fevers or chills or ongoing bleeding from anywhere.    He has now established with the [facility] and has now been increased to lenalidomide 5mg daily for 3 weeks on 1 week off.     Subjective  Patient is on video alone. He completed another cycle of lenalidomide 10mg daily for 3 weeks on. Tolerated this well, no rashes or brain fog as before. This cycle he still got 4 units of transfusion.  Denies any bleeding from anywhere including BRBPR/Melena.     Past Medical History  No past medical history on file.    Family History  No family history on file.    Social History  Smoking: Never  Alcohol use: occasionally drinks alcohol  Status:   Children/grandchildren: Did not ask.   Occupation: Housing  , .     [employer]  Current Outpatient Medications  Current Outpatient Medications   Medication Sig Dispense Refill     acyclovir (ZOVIRAX) 400 MG tablet Take 400 mg by mouth 2 times daily       amLODIPine (NORVASC) 10 MG tablet Take 1 tablet by mouth daily (Patient not taking: Reported on 9/25/2024)       aspirin 81 MG EC tablet Take 81 mg by mouth daily       cyanocobalamin (VITAMIN B-12) 1000 MCG tablet Take 1,000 mcg by mouth daily       dexAMETHasone (DECADRON) 0.5 MG/5ML elixir Take 0.1 mg by mouth daily (Patient not taking: Reported on 9/25/2024)       diltiazem ER COATED BEADS (CARDIZEM CD/CARTIA XT) 180 MG 24 hr capsule Take 180 mg by mouth daily       fluticasone (FLONASE) 50 MCG/ACT nasal spray Spray 1 spray into both nostrils daily       folic acid (FOLVITE) 1 MG tablet Take 1 mg by mouth daily       levothyroxine (SYNTHROID/LEVOTHROID) 150 MCG tablet TAKE ONE TABLET BY MOUTH ONCE DAILY. DO NOT TAKE WITH IRON,ALUMINUM,MAGNESIUM,OR CALCIUM       loratadine (CLARITIN) 10 MG tablet Take 10 mg by mouth daily       metFORMIN (GLUCOPHAGE XR) 500 MG 24 hr tablet Take 500 mg by mouth daily (with dinner)       metoprolol succinate  "ER (TOPROL XL) 50 MG 24 hr tablet Take 1 tablet by mouth daily       montelukast (SINGULAIR) 10 MG tablet Take 10 mg by mouth at bedtime       ondansetron (ZOFRAN) 8 MG tablet Take 8 mg by mouth every 8 hours as needed (Patient not taking: Reported on 2024)       prochlorperazine (COMPAZINE) 10 MG tablet Take 10 mg by mouth every 4 hours as needed (Patient not taking: Reported on 2024)       REVLIMID 5 MG CAPS capsule Take 5 mg by mouth daily       rosuvastatin (CRESTOR) 10 MG tablet Take 10 mg by mouth daily       sildenafil (REVATIO) 20 MG tablet Take 1 tab on empty stomach one hour prior to sexual activity; may increase to a max of 5 tabs       STIMULANT LAXATIVE 8.6-50 MG tablet Take 2 tablets by mouth daily as needed for constipation 200 tablet 0     timolol maleate (TIMOPTIC) 0.5 % ophthalmic solution Place 1 drop into both eyes 2 times daily       triamcinolone (KENALOG) 0.1 % external cream Apply topically 2 times daily 453.6 g 0       Allergies  Allergies   Allergen Reactions     Benazepril Other (See Comments)     Critical     Penicillin V Rash       Review of systems  A complete ROS was performed and was negative except as mentioned in HPI    Physical Exam  Vitals:    24 1319   Weight: 85.7 kg (189 lb)   Height: 1.727 m (5' 8\")       Wt Readings from Last 3 Encounters:   24 85.7 kg (189 lb)   10/30/24 88.5 kg (195 lb)   24 88.5 kg (195 lb)     ECO   male sitting in chair in NAD  Video visit limited exam.   Sitting comfortably and breathing with no acceszo    Labs and Imaging    Sodium   Date Value Ref Range Status   2024 139 135 - 145 mmol/L Final    ,   Potassium   Date Value Ref Range Status   2024 4.1 3.4 - 5.3 mmol/L Final      Creatinine   Date Value Ref Range Status   2024 0.81 0.67 - 1.17 mg/dL Final       No results found for: \"LDH\"      Uric Acid   Date Value Ref Range Status   2024 3.9 3.4 - 7.0 mg/dL Final       WBC Count   Date " Value Ref Range Status   09/25/2024 1.9 (L) 4.0 - 11.0 10e3/uL Final   ,   Hemoglobin   Date Value Ref Range Status   09/25/2024 7.6 (L) 13.3 - 17.7 g/dL Final   ],   Platelet Count   Date Value Ref Range Status   09/25/2024 117 (L) 150 - 450 10e3/uL Final   ,    MCV   Date Value Ref Range Status   09/25/2024 85 78 - 100 fL Final         EPO level was 1213 on 9/2023.        Bone marrow biopsy 7/19/2023  FINAL DIAGNOSIS   Peripheral blood, bone marrow aspirate, biopsy and clot sections ([id number]; 07/19/2023):     1. Myelodysplastic syndrome with isolated del(5q).     2. Two small monotypic B-cell populations (3% and 11% of total events) of uncertain significance, reportedly   detected by flow cytometric analysis. See comment.     COMMENT   The significance of the small monotypic B-cell populations identified by flow cytometric analysis is uncertain, as   diagnostic features of lymphoma are not seen by morphology or demonstrated by immunohistochemistry. These findings may   represent monoclonal B-cell lymphocytosis, although minimal bone marrow involvement by B-cell lymphoma cannot be   excluded. Clinical and radiographic correlation is recommended.  A lymph node or other extramedullary tissue   biopsy is suggested if lymphoma is clinically and/or radiographically suspected.   Cytogenetics 5q deletion.   NGS BCORL1 VAF 3%      Bone marrow 2/21/2024  Peripheral blood, bone marrow aspirate, touch imprint, core biopsy, and clot:     -  Pancytopenia.     -  Inadequate bone marrow aspirate, core biopsy and clot sections.      -  The flow cytometry ([id number]) of the bone marrow shows approximately 3% monotypic B-cells positive for CD5, CD19, CD20, CD23,  and lambda, approximately 14% monotypic B-cells positive for CD5 (variable), CD19, CD20 (dim to negative), CD23 (dim), CD38 (parital) and , with no definitive light chain expression, and no increased blast population identified.   The marrow aspirate  is hemodilute. The clot sections show no marrow tissue. The biopsy core shows a tiny area (<5% core sections) with a cellular (~10%) bone marrow showing tri-lineage cells including clusters of small hypolobated megakaryocytes. The immunohistochemistry on the biopsy sections appears to show increased megakaryocytes and no definitive diagnostic features of lymphoma involvement. The presence of increased small hypolobated megakaryocytes suggests persistent disease of patient's known MDS with 5q deletion. Chromosome studies show that although 20 metaphases are routinely examined, only 1 was identified and appeared normal.   The significance of the two populations of monotypic B-cells identified by flow cytometry is uncertain, as diagnostic features of lymphoma are not seen by morphology or immunohistochemistry in this inadequate marrow biopsy specimen. These findings may represent monoclonal B-cell lymphocytosis of undetermined significance or involvement of the peripheral blood by a B-cell lymphoma, although bone marrow involvement by B-cell lymphoma cannot be excluded.     Assessment and Plan  Mr. Willie Charles is a 64 year old male who is here for further evaluation and/or management of MDS.    Oncology:  MDS with 5q deletion  WHO Classification: MDS w low blasts and 5q deletion  Date of diagnosis: 7/19/2023  Revised IPSS score: 2.5 Low  Molecular IPSS: -0.42 Moderate low  Bone Marrow Blast %: 2  Cytogenetics: 5q del  Molecular: BCORL1 VAF 3%  Past Treatment: lenalidomide 5mg on 8/11/2023 to 9/2023 with addition of azacitidine since 8/28/2023 until 10/2023.   Current Treatment: Restarted lenalidomide 2.5mg twice a week for 3 weeks on 1 week off since 11/2023. Off since 3/12. Last cycle lenalidomide 10mg starting 8/28. Next cycle lenalidomide 10mg on 9/30. Frank 10mg on 11/4.     Had an admission 4/18 from 4/22 and stopped lenalidomide on 4/17. Completed 3 weeks on and 1 week of of lenalidomide and restarted on 6/1  for a new cycle.     I discussed the pathophysiology and natural history of MDS with Mr. Willie Charles today. We went over the current prognostic models and scoring systems that are used in clinical practice as well as the potential for disease evolution into acute myeloid leukemia. We went over the current FDA approved treatments for myelodysplastic syndromes and covered that the only curative option is through an allogeneic hematopoietic cell transplantation. His disease is  Moderate low  risk and currently BMT is not recommended.     We discussed that MDS with 5 q. responds well to lenalidomide and that he is not received adequate amount of treatment with lenalidomide.  Given that he is tolerating lenalidomide at this very low minimal dose I recommended that he increase the lenalidomide to 2.5 mg 5 times a week for 3 weeks on and then 1 week off.  If he continues to tolerate this well we can continue increasing the dose to eventually reach 10 mg 3 weeks on and 1 week off.  He can possibly be started on other agents if his anemia is not responding to lenalidomide including agents like Luspatercept.    He had a recent admission for neutropenic fever s/p antibiotics without blood cultures showing anything.  He did have diffuse ST elevations concerning for stefan/pericarditis.  Course was complicated by A-fib with RVR.  Not sure if the troponin leak was secondary to the RVR.  Though there are rare cases of arrhythmias with lenalidomide doubt if this was related to it.    He has now tolerated a full cycle of lenalidomide 2.5mg daily for 3 weeks and 1 week off.  We increased his lenalidomide to 5mg he has tolerated for 2 cycles with minimal rash that disappeared on its own without any treatment.     He tolerated the lenalidomide 10 mg for 3 weeks on and 1 week off starting 8/28. He has now finished another cycle that started on 9/30 and another cycle from 11/4   Last ANC was 700 on 11/29 and he has not had any  infectious complications. We will delay his next cycle to 12/9. Bone marrow around 1/9. RTC with me on 1/17 with labs prior.    If ANC continues to be below 0.5 for more than 7 days and he continues to have recurrent infections then we can consider prophylactic antibiotics.     Next cycle on 12/9. Will do 21 days of david 10 and 2 weeks off.     #Elevated ferritin 1676  - STOPPED Deferasirox.   - He has very minimal elevated ferritin and without quantification of liver iron stores would not start a chelating agent. Especially deferasirox which can cause agranulocytosis and liver function issues and kidney issues.   - His rash has now resolved since he stopped the deferasirix.     Plan:  - c/w lenalidomide to 10mg q day for 3 weeks on and 2 week off starting 12/09.   - Bone marrow around 1/9.   - RTC with me on 1/17 with labs prior.  - c/w twice weekly labs and as needed transfusions at local oncologist.  - If ANC trends below 500 consistently for more than 1 week can start levofloxacin as prophylaxis.     Hematology:  Anemia/Thrombocytopenia:   Transfuse leukocyte reduced and irradiated blood products: 1 unit pRBC if hgb is 7.0-7.9, 2 units pRBCs if Hgb 6.0-6.9 g/dl, 1 unit platelets if PLT count is <20,000 or <50,000 with clinical bleeding.    Immunocompromised:  As a result of his disease and/or previous treatment. Mr. Willie Charles is immunocompromised. his last ANC is >500 and there is no need for prophylactic antibiotics at this time.     Cardiac:  Mr. Willie Charles has no history of heart disease.     Renal:  Creatinine results reviewed today. Mr. Willie Charles does not have any renal disease.    Hepatic:  Liver function tests reviewed today.    Psychosocial:  Mr. Willie Charles is coping well with his diagnosis.     All other questions and concerns were addressed and answered to Mr. Willie Charles's satisfaction.    Today, I spent a total of 40 minutes of face-to-face and non face-to-face time  with Mr. Willie Charles. Time spent included review and discussion of diagnostic test results, patient counseling, and coordination of care.    The longitudinal plan of care for the diagnosis(es)/condition(s) as documented were addressed during this visit. Due to the added complexity in care, I will continue to support Willie in the subsequent management and with ongoing continuity of care.    Haroon Ellis MD    Division of Hematology, Oncology and Transplantation  [facility]  P: [phone]      Again, thank you for allowing me to participate in the care of your patient.        Sincerely,        Haroon Ellis MD   No

## 2024-11-29 NOTE — PROGRESS NOTES
Virtual Visit Details    Type of service:  Video Visit   Video Start Time:  1:30  Video End Time:2:00 PM    Originating Location (pt. Location): Home    Distant Location (provider location):  On-site  Platform used for Video Visit: [facility]  HEMATOLOGY & ONCOLOGY  FOLLOW UP VISIT    PATIENT NAME: Willie Charles          MRN # [id number]  YOB: 1960  DATE OF VISIT:  Nov 29, 2024            REFERRING PROVIDER:   Mr. Willie Charles was seen at the request of Arely for further evaluation and/or management.     History of Presenting Illness  Mr. Willie Charles is a pleasant 63 year old male with a past medical history significant for hyperthyroisidism s/p radioactive iodine, HTN  and MDS dx in 7/2023 after he had fatigue for several months found to have bicytopenia with WBC 3.3 and hemoglobin 7.0 and platelets 308 that led to a bone marrow biopsy on 7/19/2023 that showed hypocellular marrow with 10 to 20% cellularity and 2% blasts with megakaryocytic hyperplasia with dysplasia.  Cytogenetics showed 5 q. deletion and NGS showed BCORL1 with a VAF of 3%.  He was started on lenalidomide 5 mg daily on 8/11/2023.  His anemia worsened after a few days and he was then started on concurrent azacitidine in addition to Revlimid on 8/28/2023.  As per records his counts trended down by 9/2023 and his Revlimid was discontinued.  He was continued on 2 more cycles of azacitidine until October 2023.  He did see Dr. Squires at [facility] in 11/2023 and was recommended to restart Revlimid starting at 2.5 mg dosing every other day for 3 weeks on and 1 week off.  He was started on 2.5 mg 2 times a week  and has been tolerating that for the last 3 months.He has continued to require about 2-4 prbcs in a month.  Denies any fevers or chills or ongoing bleeding from anywhere.    He has now established with the [facility] and has now been increased to lenalidomide 5mg daily for 3 weeks on 1 week off.      Subjective  Patient is on video alone. He completed another cycle of lenalidomide 10mg daily for 3 weeks on. Tolerated this well, no rashes or brain fog as before. This cycle he still got 4 units of transfusion.  Denies any bleeding from anywhere including BRBPR/Melena.     Past Medical History  No past medical history on file.    Family History  No family history on file.    Social History  Smoking: Never  Alcohol use: occasionally drinks alcohol  Status:   Children/grandchildren: Did not ask.   Occupation: Housing  , .     [employer]  Current Outpatient Medications  Current Outpatient Medications   Medication Sig Dispense Refill    acyclovir (ZOVIRAX) 400 MG tablet Take 400 mg by mouth 2 times daily      amLODIPine (NORVASC) 10 MG tablet Take 1 tablet by mouth daily (Patient not taking: Reported on 9/25/2024)      aspirin 81 MG EC tablet Take 81 mg by mouth daily      cyanocobalamin (VITAMIN B-12) 1000 MCG tablet Take 1,000 mcg by mouth daily      dexAMETHasone (DECADRON) 0.5 MG/5ML elixir Take 0.1 mg by mouth daily (Patient not taking: Reported on 9/25/2024)      diltiazem ER COATED BEADS (CARDIZEM CD/CARTIA XT) 180 MG 24 hr capsule Take 180 mg by mouth daily      fluticasone (FLONASE) 50 MCG/ACT nasal spray Spray 1 spray into both nostrils daily      folic acid (FOLVITE) 1 MG tablet Take 1 mg by mouth daily      levothyroxine (SYNTHROID/LEVOTHROID) 150 MCG tablet TAKE ONE TABLET BY MOUTH ONCE DAILY. DO NOT TAKE WITH IRON,ALUMINUM,MAGNESIUM,OR CALCIUM      loratadine (CLARITIN) 10 MG tablet Take 10 mg by mouth daily      metFORMIN (GLUCOPHAGE XR) 500 MG 24 hr tablet Take 500 mg by mouth daily (with dinner)      metoprolol succinate ER (TOPROL XL) 50 MG 24 hr tablet Take 1 tablet by mouth daily      montelukast (SINGULAIR) 10 MG tablet Take 10 mg by mouth at bedtime      ondansetron (ZOFRAN) 8 MG tablet Take 8 mg by mouth every 8 hours as needed (Patient not taking: Reported on  "2024)      prochlorperazine (COMPAZINE) 10 MG tablet Take 10 mg by mouth every 4 hours as needed (Patient not taking: Reported on 2024)      REVLIMID 5 MG CAPS capsule Take 5 mg by mouth daily      rosuvastatin (CRESTOR) 10 MG tablet Take 10 mg by mouth daily      sildenafil (REVATIO) 20 MG tablet Take 1 tab on empty stomach one hour prior to sexual activity; may increase to a max of 5 tabs      STIMULANT LAXATIVE 8.6-50 MG tablet Take 2 tablets by mouth daily as needed for constipation 200 tablet 0    timolol maleate (TIMOPTIC) 0.5 % ophthalmic solution Place 1 drop into both eyes 2 times daily      triamcinolone (KENALOG) 0.1 % external cream Apply topically 2 times daily 453.6 g 0       Allergies  Allergies   Allergen Reactions    Benazepril Other (See Comments)     Critical    Penicillin V Rash       Review of systems  A complete ROS was performed and was negative except as mentioned in HPI    Physical Exam  Vitals:    24 1319   Weight: 85.7 kg (189 lb)   Height: 1.727 m (5' 8\")       Wt Readings from Last 3 Encounters:   24 85.7 kg (189 lb)   10/30/24 88.5 kg (195 lb)   24 88.5 kg (195 lb)     ECO   male sitting in chair in NAD  Video visit limited exam.   Sitting comfortably and breathing with no acceszo    Labs and Imaging    Sodium   Date Value Ref Range Status   2024 139 135 - 145 mmol/L Final    ,   Potassium   Date Value Ref Range Status   2024 4.1 3.4 - 5.3 mmol/L Final      Creatinine   Date Value Ref Range Status   2024 0.81 0.67 - 1.17 mg/dL Final       No results found for: \"LDH\"      Uric Acid   Date Value Ref Range Status   2024 3.9 3.4 - 7.0 mg/dL Final       WBC Count   Date Value Ref Range Status   2024 1.9 (L) 4.0 - 11.0 10e3/uL Final   ,   Hemoglobin   Date Value Ref Range Status   2024 7.6 (L) 13.3 - 17.7 g/dL Final   ],   Platelet Count   Date Value Ref Range Status   2024 117 (L) 150 - 450 10e3/uL Final   ,  "   MCV   Date Value Ref Range Status   09/25/2024 85 78 - 100 fL Final         EPO level was 1213 on 9/2023.        Bone marrow biopsy 7/19/2023  FINAL DIAGNOSIS   Peripheral blood, bone marrow aspirate, biopsy and clot sections ([id number]; 07/19/2023):     1. Myelodysplastic syndrome with isolated del(5q).     2. Two small monotypic B-cell populations (3% and 11% of total events) of uncertain significance, reportedly   detected by flow cytometric analysis. See comment.     COMMENT   The significance of the small monotypic B-cell populations identified by flow cytometric analysis is uncertain, as   diagnostic features of lymphoma are not seen by morphology or demonstrated by immunohistochemistry. These findings may   represent monoclonal B-cell lymphocytosis, although minimal bone marrow involvement by B-cell lymphoma cannot be   excluded. Clinical and radiographic correlation is recommended.  A lymph node or other extramedullary tissue   biopsy is suggested if lymphoma is clinically and/or radiographically suspected.   Cytogenetics 5q deletion.   NGS BCORL1 VAF 3%      Bone marrow 2/21/2024  Peripheral blood, bone marrow aspirate, touch imprint, core biopsy, and clot:     -  Pancytopenia.     -  Inadequate bone marrow aspirate, core biopsy and clot sections.      -  The flow cytometry ([id number]) of the bone marrow shows approximately 3% monotypic B-cells positive for CD5, CD19, CD20, CD23,  and lambda, approximately 14% monotypic B-cells positive for CD5 (variable), CD19, CD20 (dim to negative), CD23 (dim), CD38 (parital) and , with no definitive light chain expression, and no increased blast population identified.   The marrow aspirate is hemodilute. The clot sections show no marrow tissue. The biopsy core shows a tiny area (<5% core sections) with a cellular (~10%) bone marrow showing tri-lineage cells including clusters of small hypolobated megakaryocytes. The immunohistochemistry on the  biopsy sections appears to show increased megakaryocytes and no definitive diagnostic features of lymphoma involvement. The presence of increased small hypolobated megakaryocytes suggests persistent disease of patient's known MDS with 5q deletion. Chromosome studies show that although 20 metaphases are routinely examined, only 1 was identified and appeared normal.   The significance of the two populations of monotypic B-cells identified by flow cytometry is uncertain, as diagnostic features of lymphoma are not seen by morphology or immunohistochemistry in this inadequate marrow biopsy specimen. These findings may represent monoclonal B-cell lymphocytosis of undetermined significance or involvement of the peripheral blood by a B-cell lymphoma, although bone marrow involvement by B-cell lymphoma cannot be excluded.     Assessment and Plan  Mr. Willie Charles is a 64 year old male who is here for further evaluation and/or management of MDS.    Oncology:  MDS with 5q deletion  WHO Classification: MDS w low blasts and 5q deletion  Date of diagnosis: 7/19/2023  Revised IPSS score: 2.5 Low  Molecular IPSS: -0.42 Moderate low  Bone Marrow Blast %: 2  Cytogenetics: 5q del  Molecular: BCORL1 VAF 3%  Past Treatment: lenalidomide 5mg on 8/11/2023 to 9/2023 with addition of azacitidine since 8/28/2023 until 10/2023.   Current Treatment: Restarted lenalidomide 2.5mg twice a week for 3 weeks on 1 week off since 11/2023. Off since 3/12. Last cycle lenalidomide 10mg starting 8/28. Next cycle lenalidomide 10mg on 9/30. Frank 10mg on 11/4.     Had an admission 4/18 from 4/22 and stopped lenalidomide on 4/17. Completed 3 weeks on and 1 week of of lenalidomide and restarted on 6/1 for a new cycle.     I discussed the pathophysiology and natural history of MDS with Mr. Willie Charles today. We went over the current prognostic models and scoring systems that are used in clinical practice as well as the potential for disease evolution  into acute myeloid leukemia. We went over the current FDA approved treatments for myelodysplastic syndromes and covered that the only curative option is through an allogeneic hematopoietic cell transplantation. His disease is  Moderate low  risk and currently BMT is not recommended.     We discussed that MDS with 5 q. responds well to lenalidomide and that he is not received adequate amount of treatment with lenalidomide.  Given that he is tolerating lenalidomide at this very low minimal dose I recommended that he increase the lenalidomide to 2.5 mg 5 times a week for 3 weeks on and then 1 week off.  If he continues to tolerate this well we can continue increasing the dose to eventually reach 10 mg 3 weeks on and 1 week off.  He can possibly be started on other agents if his anemia is not responding to lenalidomide including agents like Luspatercept.    He had a recent admission for neutropenic fever s/p antibiotics without blood cultures showing anything.  He did have diffuse ST elevations concerning for stefan/pericarditis.  Course was complicated by A-fib with RVR.  Not sure if the troponin leak was secondary to the RVR.  Though there are rare cases of arrhythmias with lenalidomide doubt if this was related to it.    He has now tolerated a full cycle of lenalidomide 2.5mg daily for 3 weeks and 1 week off.  We increased his lenalidomide to 5mg he has tolerated for 2 cycles with minimal rash that disappeared on its own without any treatment.     He tolerated the lenalidomide 10 mg for 3 weeks on and 1 week off starting 8/28. He has now finished another cycle that started on 9/30 and another cycle from 11/4   Last ANC was 700 on 11/29 and he has not had any infectious complications. We will delay his next cycle to 12/9. Bone marrow around 1/9. RTC with me on 1/17 with labs prior.    If ANC continues to be below 0.5 for more than 7 days and he continues to have recurrent infections then we can consider prophylactic  antibiotics.     Next cycle on 12/9. Will do 21 days of david 10 and 2 weeks off.     #Elevated ferritin 1676  - STOPPED Deferasirox.   - He has very minimal elevated ferritin and without quantification of liver iron stores would not start a chelating agent. Especially deferasirox which can cause agranulocytosis and liver function issues and kidney issues.   - His rash has now resolved since he stopped the deferasirix.     Plan:  - c/w lenalidomide to 10mg q day for 3 weeks on and 2 week off starting 12/09.   - Bone marrow around 1/9.   - RTC with me on 1/17 with labs prior.  - c/w twice weekly labs and as needed transfusions at local oncologist.  - If ANC trends below 500 consistently for more than 1 week can start levofloxacin as prophylaxis.     Hematology:  Anemia/Thrombocytopenia:   Transfuse leukocyte reduced and irradiated blood products: 1 unit pRBC if hgb is 7.0-7.9, 2 units pRBCs if Hgb 6.0-6.9 g/dl, 1 unit platelets if PLT count is <20,000 or <50,000 with clinical bleeding.    Immunocompromised:  As a result of his disease and/or previous treatment. Mr. Willie Charles is immunocompromised. his last ANC is >500 and there is no need for prophylactic antibiotics at this time.     Cardiac:  Mr. Willie Charles has no history of heart disease.     Renal:  Creatinine results reviewed today. Mr. Willie Charles does not have any renal disease.    Hepatic:  Liver function tests reviewed today.    Psychosocial:  Mr. Willie Charles is coping well with his diagnosis.     All other questions and concerns were addressed and answered to Mr. Willie Charles's satisfaction.    Today, I spent a total of 40 minutes of face-to-face and non face-to-face time with Mr. Willie Charles. Time spent included review and discussion of diagnostic test results, patient counseling, and coordination of care.    The longitudinal plan of care for the diagnosis(es)/condition(s) as documented were addressed during this visit.  Due to the added complexity in care, I will continue to support Willie in the subsequent management and with ongoing continuity of care.    Haroon Ellis MD    Division of Hematology, Oncology and Transplantation  [facility]  P: [phone]

## 2025-01-09 ENCOUNTER — OFFICE VISIT (OUTPATIENT)
Dept: ONCOLOGY | Facility: CLINIC | Age: 65
End: 2025-01-09
Attending: STUDENT IN AN ORGANIZED HEALTH CARE EDUCATION/TRAINING PROGRAM
Payer: COMMERCIAL

## 2025-01-09 ENCOUNTER — APPOINTMENT (OUTPATIENT)
Dept: LAB | Facility: CLINIC | Age: 65
End: 2025-01-09
Attending: STUDENT IN AN ORGANIZED HEALTH CARE EDUCATION/TRAINING PROGRAM
Payer: COMMERCIAL

## 2025-01-09 VITALS
TEMPERATURE: 97.8 F | SYSTOLIC BLOOD PRESSURE: 133 MMHG | WEIGHT: 193.4 LBS | HEART RATE: 68 BPM | OXYGEN SATURATION: 97 % | RESPIRATION RATE: 18 BRPM | BODY MASS INDEX: 29.41 KG/M2 | DIASTOLIC BLOOD PRESSURE: 74 MMHG

## 2025-01-09 DIAGNOSIS — D46.9 MDS (MYELODYSPLASTIC SYNDROME) (H): ICD-10-CM

## 2025-01-09 LAB
ALBUMIN SERPL BCG-MCNC: 3.7 G/DL (ref 3.5–5.2)
ALP SERPL-CCNC: 102 U/L (ref 40–150)
ALT SERPL W P-5'-P-CCNC: 112 U/L (ref 0–70)
ANION GAP SERPL CALCULATED.3IONS-SCNC: 7 MMOL/L (ref 7–15)
AST SERPL W P-5'-P-CCNC: 50 U/L (ref 0–45)
BASOPHILS # BLD AUTO: 0 10E3/UL (ref 0–0.2)
BASOPHILS NFR BLD AUTO: 1 %
BILIRUB SERPL-MCNC: 0.2 MG/DL
BUN SERPL-MCNC: 22.3 MG/DL (ref 8–23)
CALCIUM SERPL-MCNC: 8.7 MG/DL (ref 8.8–10.4)
CHLORIDE SERPL-SCNC: 107 MMOL/L (ref 98–107)
CREAT SERPL-MCNC: 0.79 MG/DL (ref 0.67–1.17)
EGFRCR SERPLBLD CKD-EPI 2021: >90 ML/MIN/1.73M2
EOSINOPHIL # BLD AUTO: 0 10E3/UL (ref 0–0.7)
EOSINOPHIL NFR BLD AUTO: 1 %
ERYTHROCYTE [DISTWIDTH] IN BLOOD BY AUTOMATED COUNT: 15.9 % (ref 10–15)
GLUCOSE SERPL-MCNC: 225 MG/DL (ref 70–99)
HCO3 SERPL-SCNC: 25 MMOL/L (ref 22–29)
HCT VFR BLD AUTO: 25 % (ref 40–53)
HGB BLD-MCNC: 8.5 G/DL (ref 13.3–17.7)
IMM GRANULOCYTES # BLD: 0 10E3/UL
IMM GRANULOCYTES NFR BLD: 1 %
LYMPHOCYTES # BLD AUTO: 1.2 10E3/UL (ref 0.8–5.3)
LYMPHOCYTES NFR BLD AUTO: 54 %
MCH RBC QN AUTO: 29.5 PG (ref 26.5–33)
MCHC RBC AUTO-ENTMCNC: 34 G/DL (ref 31.5–36.5)
MCV RBC AUTO: 87 FL (ref 78–100)
MONOCYTES # BLD AUTO: 0.2 10E3/UL (ref 0–1.3)
MONOCYTES NFR BLD AUTO: 11 %
NEUTROPHILS # BLD AUTO: 0.7 10E3/UL (ref 1.6–8.3)
NEUTROPHILS NFR BLD AUTO: 34 %
NRBC # BLD AUTO: 0 10E3/UL
NRBC BLD AUTO-RTO: 0 /100
PLATELET # BLD AUTO: 215 10E3/UL (ref 150–450)
POTASSIUM SERPL-SCNC: 4.6 MMOL/L (ref 3.4–5.3)
PROT SERPL-MCNC: 6.8 G/DL (ref 6.4–8.3)
RBC # BLD AUTO: 2.88 10E6/UL (ref 4.4–5.9)
SODIUM SERPL-SCNC: 139 MMOL/L (ref 135–145)
URATE SERPL-MCNC: 3.8 MG/DL (ref 3.4–7)
WBC # BLD AUTO: 2.2 10E3/UL (ref 4–11)

## 2025-01-09 PROCEDURE — 84132 ASSAY OF SERUM POTASSIUM: CPT | Performed by: REGISTERED NURSE

## 2025-01-09 PROCEDURE — 38222 DX BONE MARROW BX & ASPIR: CPT | Performed by: REGISTERED NURSE

## 2025-01-09 PROCEDURE — 84550 ASSAY OF BLOOD/URIC ACID: CPT | Performed by: REGISTERED NURSE

## 2025-01-09 PROCEDURE — 88275 CYTOGENETICS 100-300: CPT | Performed by: REGISTERED NURSE

## 2025-01-09 PROCEDURE — 36591 DRAW BLOOD OFF VENOUS DEVICE: CPT | Performed by: REGISTERED NURSE

## 2025-01-09 PROCEDURE — 85004 AUTOMATED DIFF WBC COUNT: CPT | Performed by: REGISTERED NURSE

## 2025-01-09 PROCEDURE — 88280 CHROMOSOME KARYOTYPE STUDY: CPT | Performed by: REGISTERED NURSE

## 2025-01-09 PROCEDURE — 250N000011 HC RX IP 250 OP 636: Performed by: REGISTERED NURSE

## 2025-01-09 PROCEDURE — 88237 TISSUE CULTURE BONE MARROW: CPT | Performed by: REGISTERED NURSE

## 2025-01-09 PROCEDURE — G0452 MOLECULAR PATHOLOGY INTERPR: HCPCS | Mod: 26 | Performed by: PATHOLOGY

## 2025-01-09 PROCEDURE — 81450 HL NEO GSAP 5-50DNA/DNA&RNA: CPT | Performed by: STUDENT IN AN ORGANIZED HEALTH CARE EDUCATION/TRAINING PROGRAM

## 2025-01-09 PROCEDURE — 85018 HEMOGLOBIN: CPT | Performed by: REGISTERED NURSE

## 2025-01-09 PROCEDURE — 88271 CYTOGENETICS DNA PROBE: CPT | Performed by: REGISTERED NURSE

## 2025-01-09 RX ORDER — HEPARIN SODIUM (PORCINE) LOCK FLUSH IV SOLN 100 UNIT/ML 100 UNIT/ML
5 SOLUTION INTRAVENOUS ONCE
Status: COMPLETED | OUTPATIENT
Start: 2025-01-09 | End: 2025-01-09

## 2025-01-09 RX ADMIN — Medication 5 ML: at 11:20

## 2025-01-09 RX ADMIN — MIDAZOLAM 1 MG: 1 INJECTION INTRAMUSCULAR; INTRAVENOUS at 10:08

## 2025-01-09 NOTE — Clinical Note
1/9/2025      Willie Charles  [address]  [place] [address]      Dear Colleague,    Thank you for referring your patient, Willie Charles, to the [facility]. Please see a copy of my visit note below.    BMT ONC Adult Bone Marrow Biopsy Procedure Note  January 9, 2025  BP (!) 144/71 (BP Location: Left arm)   Pulse 71   Temp 97.8  F (36.6  C) (Oral)   Resp 18   Wt 87.7 kg (193 lb 6.4 oz)   SpO2 98%   BMI 29.41 kg/m     DIAGNOSIS: MDS     PROCEDURE: Unilateral Bone Marrow Biopsy and Unilateral Aspirate    [place] [facility] [place]    Patient s identification was positively verified by verbal identification and invasive procedure safety checklist was completed. Informed consent was obtained. Following the administration of Midazolam 1 mg as pre-medication, patient was placed in the prone position and prepped and draped in a sterile manner. Approximately 10 cc of 1% Lidocaine was used over the left posterior iliac spine. Following this a 3 mm incision was made. Trephine bone marrow core(s) was (were) obtained from the [facility]. Bone marrow aspirates were obtained from the [facility]. Aspirates were sent for morphology, immunophenotyping, cytogenetics, and molecular diagnostics myeloid malignancy panel. A total of approximately 20 ml of marrow was aspirated. Following this procedure a sterile dressing was applied to the bone marrow biopsy site(s). The patient was placed in the supine position to maintain pressure on the biopsy site. Post-procedure wound care instructions were given.     Complications: NO    Pre-procedural pain: 0 out of 10 on the numeric pain rating scale.     Procedural pain: 0 out of 10 on the numeric pain rating scale.     Post-procedural pain assessment: 0 out of 10 on the numeric pain rating scale.     Interventions: NO    Length of procedure:20 minutes or less      Procedure performed by: Ban Keenan CNP          Again, thank you for allowing me to participate in  the care of your patient.        Sincerely,        Ban Keenan CNP    Electronically signed

## 2025-01-09 NOTE — NURSING NOTE
"Oncology Rooming Note    January 9, 2025 9:50 AM   Willie Charles is a 64 year old male who presents for:    Chief Complaint   Patient presents with    Oncology Clinic Visit     BMBX related to MDS     Initial Vitals: BP (!) 144/71 (BP Location: Left arm)   Pulse 71   Temp 97.8  F (36.6  C) (Oral)   Resp 18   Wt 87.7 kg (193 lb 6.4 oz)   SpO2 98%   BMI 29.41 kg/m   Estimated body mass index is 29.41 kg/m  as calculated from the following:    Height as of 11/29/24: 1.727 m (5' 8\").    Weight as of this encounter: 87.7 kg (193 lb 6.4 oz). Body surface area is 2.05 meters squared.  No Pain (0) Comment: Data Unavailable   No LMP for male patient.  Allergies reviewed: Yes  Medications reviewed: Yes    Medications: Medication refills not needed today.  Pharmacy name entered into [employer]: [id number] [place] [id number] [facility]    Frailty Screening:   Is the patient here for a new oncology consult visit in cancer care? 2. No      Clinical concerns: N/A       Sanjay Tomlinson RN    Provider order received to administer Versed 1mg IVP as premed for BMBX. Procedural consent discussed and pt's signature obtained.  Allergies reviewed.  PT currently alert and oriented to plan of care.  Pt lying prone in stretcher.  Call light w/in reach.  Provider and  at bedside. Pt voices understanding of no driving, operating heavy machinery, or making important decisions for the next 24 hours. Pt also is aware that its important to have someone with them for the next 24 hours as well as they recover from the sedative.         BMBX Teaching and Assessment       Teaching concerns addressed: Bone marrow biopsy and infection prevention.     Person(s) involved in teaching: Patient  Motivation Level  Asks Questions: Yes  Eager to Learn: Yes  Cooperative: Yes  Receptive (willing/able to accept information): Yes    Patient demonstrates understanding of the following:     Reason for the appointment, diagnosis and " treatment plan: Yes  Knowledge of proper use of medications and conditions for which they are ordered (with special attention to potential side effects or drug interactions): Yes  Which situations necessitate calling provider and whom to contact: Yes    Teaching concerns addressed:   Reviewed activity restrictions if received premeds, potential for bleeding and actions to take if develops any of the issues below    Pain management techniques: Yes  Patient instructed on hand hygiene: Yes  How and/when to access community resources: Yes    Infection Control:  Patient demonstrates understanding of the following:   Bone marrow procedure site care taught: Yes. Keep dressing on for 24 hours, no showering for 24 hours, no submerging biopsy site under water until completely healed. If bleeding noted, lay on rolled up towel for 15 minutes-if it continues to bleed, seek medical assessment immediately.   Signs and symptoms of infection taught: Yes       Instructional Materials Used/Given: Patient tolerated 30 minute bedrest. Pt instructed to keep bmbx site clean and dry for 24hrs. Pt educated to monitor site for signs of infection such as redness, rash, oozing, puss, bleeding, pain, and elevated temp. Pt instructed to go to call the [facility] triage line or go to the ER if any signs of infection should occur. Pt educated to not operate machinery if receiving versed. Pt and wife verbalize understanding.     Pre-procedure labs drawn via port. Post procedure: Patient vital signs stable, ambulating, site is clean, dry and intact prior to discharge and line removed. Pt discharged with wife as .

## 2025-01-09 NOTE — PATIENT INSTRUCTIONS
[facility] Triage and after hours / weekends / holidays:  [phone]    Please call the triage or after hours line if you experience a temperature greater than or equal to 100.4, shaking chills, have uncontrolled nausea, vomiting and/or diarrhea, dizziness, shortness of breath, chest pain, bleeding, unexplained bruising, or if you have any other new/concerning symptoms, questions or concerns.      If you are having any concerning symptoms or wish to speak to a provider before your next infusion visit, please call triage to notify them so we can adequately serve you.     If you need a refill on a narcotic prescription or other medication, please call before your infusion appointment.               January 2025 Sunday Monday Tuesday Wednesday Thursday Friday Saturday                  1     2     3     4       5     6     7     8     9    LAB PERIPHERAL   9:00 AM   (15 min.)   [facility]   [facility]    UMP BONE MARROW BIOPSY   9:45 AM   (90 min.)   Ban Keenan CNP   [facility] 10     11       12     13     14     15     16     17    LAB PERIPHERAL  11:00 AM   (15 min.)   [facility]   [facility]    RETURN CCSL  11:15 AM   (30 min.)   Haroon Ellis MD   [facility] 18       19     20     21     22     23     24     25       26     27     28     29     30     31                      February 2025 Sunday Monday Tuesday Wednesday Thursday Friday Saturday                                 1       2     3     4     5     6     7     8       9     10     11     12     13     14     15       16     17     18     19     20     21     22       23     24     25     26     27     28                          Lab Results:  Recent Results (from the past 12 hours)   Uric acid    Collection Time: 01/09/25  9:25 AM   Result Value Ref Range    Uric Acid 3.8 3.4 - 7.0 mg/dL   Comprehensive  metabolic panel    Collection Time: 01/09/25  9:25 AM   Result Value Ref Range    Sodium 139 135 - 145 mmol/L    Potassium 4.6 3.4 - 5.3 mmol/L    Carbon Dioxide (CO2) 25 22 - 29 mmol/L    Anion Gap 7 7 - 15 mmol/L    Urea Nitrogen 22.3 8.0 - 23.0 mg/dL    Creatinine 0.79 0.67 - 1.17 mg/dL    GFR Estimate >90 >60 mL/min/1.73m2    Calcium 8.7 (L) 8.8 - 10.4 mg/dL    Chloride 107 98 - 107 mmol/L    Glucose 225 (H) 70 - 99 mg/dL    Alkaline Phosphatase 102 40 - 150 U/L    AST 50 (H) 0 - 45 U/L     (H) 0 - 70 U/L    Protein Total 6.8 6.4 - 8.3 g/dL    Albumin 3.7 3.5 - 5.2 g/dL    Bilirubin Total 0.2 <=1.2 mg/dL   CBC with platelets and differential    Collection Time: 01/09/25  9:25 AM   Result Value Ref Range    WBC Count 2.2 (L) 4.0 - 11.0 10e3/uL    RBC Count 2.88 (L) 4.40 - 5.90 10e6/uL    Hemoglobin 8.5 (L) 13.3 - 17.7 g/dL    Hematocrit 25.0 (L) 40.0 - 53.0 %    MCV 87 78 - 100 fL    MCH 29.5 26.5 - 33.0 pg    MCHC 34.0 31.5 - 36.5 g/dL    RDW 15.9 (H) 10.0 - 15.0 %    Platelet Count 215 150 - 450 10e3/uL    % Neutrophils 34 %    % Lymphocytes 54 %    % Monocytes 11 %    % Eosinophils 1 %    % Basophils 1 %    % Immature Granulocytes 1 %    NRBCs per 100 WBC 0 <1 /100    Absolute Neutrophils 0.7 (L) 1.6 - 8.3 10e3/uL    Absolute Lymphocytes 1.2 0.8 - 5.3 10e3/uL    Absolute Monocytes 0.2 0.0 - 1.3 10e3/uL    Absolute Eosinophils 0.0 0.0 - 0.7 10e3/uL    Absolute Basophils 0.0 0.0 - 0.2 10e3/uL    Absolute Immature Granulocytes 0.0 <=0.4 10e3/uL    Absolute NRBCs 0.0 10e3/uL

## 2025-01-09 NOTE — PROGRESS NOTES
BMT ONC Adult Bone Marrow Biopsy Procedure Note  January 9, 2025  BP (!) 144/71 (BP Location: Left arm)   Pulse 71   Temp 97.8  F (36.6  C) (Oral)   Resp 18   Wt 87.7 kg (193 lb 6.4 oz)   SpO2 98%   BMI 29.41 kg/m     DIAGNOSIS: MDS     PROCEDURE: Unilateral Bone Marrow Biopsy and Unilateral Aspirate    [place]ATION: [facility] [place] 2nd [facility]    Patient s identification was positively verified by verbal identification and invasive procedure safety checklist was completed. Informed consent was obtained. Following the administration of Midazolam 1 mg as pre-medication, patient was placed in the prone position and prepped and draped in a sterile manner. Approximately 10 cc of 1% Lidocaine was used over the left posterior iliac spine. Following this a 3 mm incision was made. Trephine bone marrow core(s) was (were) obtained from the [facility]. Bone marrow aspirates were obtained from the IC. Aspirates were sent for morphology, immunophenotyping, cytogenetics, and molecular diagnostics myeloid malignancy panel. A total of approximately 20 ml of marrow was aspirated. Following this procedure a sterile dressing was applied to the bone marrow biopsy site(s). The patient was placed in the supine position to maintain pressure on the biopsy site. Post-procedure wound care instructions were given.     Complications: NO    Pre-procedural pain: 0 out of 10 on the numeric pain rating scale.     Procedural pain: 0 out of 10 on the numeric pain rating scale.     Post-procedural pain assessment: 0 out of 10 on the numeric pain rating scale.     Interventions: NO    Length of procedure:20 minutes or less      Procedure performed by: Ban Keenan CNP

## 2025-01-13 LAB
PATH REPORT.COMMENTS IMP SPEC: ABNORMAL
PATH REPORT.COMMENTS IMP SPEC: YES
PATH REPORT.FINAL DX SPEC: ABNORMAL
PATH REPORT.MICROSCOPIC SPEC OTHER STN: ABNORMAL
PATH REPORT.RELEVANT HX SPEC: ABNORMAL

## 2025-01-14 LAB
PATH REPORT.COMMENTS IMP SPEC: ABNORMAL
PATH REPORT.COMMENTS IMP SPEC: YES
PATH REPORT.FINAL DX SPEC: ABNORMAL
PATH REPORT.GROSS SPEC: ABNORMAL
PATH REPORT.MICROSCOPIC SPEC OTHER STN: ABNORMAL
PATH REPORT.MICROSCOPIC SPEC OTHER STN: ABNORMAL
PATH REPORT.RELEVANT HX SPEC: ABNORMAL

## 2025-01-15 LAB — INTERPRETATION: NORMAL

## 2025-01-17 ENCOUNTER — ONCOLOGY VISIT (OUTPATIENT)
Dept: ONCOLOGY | Facility: CLINIC | Age: 65
End: 2025-01-17
Attending: STUDENT IN AN ORGANIZED HEALTH CARE EDUCATION/TRAINING PROGRAM
Payer: COMMERCIAL

## 2025-01-17 ENCOUNTER — APPOINTMENT (OUTPATIENT)
Dept: LAB | Facility: CLINIC | Age: 65
End: 2025-01-17
Attending: STUDENT IN AN ORGANIZED HEALTH CARE EDUCATION/TRAINING PROGRAM
Payer: COMMERCIAL

## 2025-01-17 VITALS
DIASTOLIC BLOOD PRESSURE: 65 MMHG | RESPIRATION RATE: 18 BRPM | SYSTOLIC BLOOD PRESSURE: 137 MMHG | WEIGHT: 195.1 LBS | HEART RATE: 64 BPM | BODY MASS INDEX: 29.66 KG/M2 | TEMPERATURE: 97.7 F | OXYGEN SATURATION: 97 %

## 2025-01-17 DIAGNOSIS — D46.9 MDS (MYELODYSPLASTIC SYNDROME) (H): Primary | ICD-10-CM

## 2025-01-17 LAB
ALBUMIN SERPL BCG-MCNC: 3.8 G/DL (ref 3.5–5.2)
ALP SERPL-CCNC: 106 U/L (ref 40–150)
ALT SERPL W P-5'-P-CCNC: 91 U/L (ref 0–70)
ANION GAP SERPL CALCULATED.3IONS-SCNC: 7 MMOL/L (ref 7–15)
AST SERPL W P-5'-P-CCNC: 36 U/L (ref 0–45)
BASOPHILS # BLD AUTO: 0 10E3/UL (ref 0–0.2)
BASOPHILS NFR BLD AUTO: 2 %
BILIRUB SERPL-MCNC: 0.3 MG/DL
BUN SERPL-MCNC: 16.1 MG/DL (ref 8–23)
CALCIUM SERPL-MCNC: 8.9 MG/DL (ref 8.8–10.4)
CHLORIDE SERPL-SCNC: 105 MMOL/L (ref 98–107)
CREAT SERPL-MCNC: 0.8 MG/DL (ref 0.67–1.17)
EGFRCR SERPLBLD CKD-EPI 2021: >90 ML/MIN/1.73M2
EOSINOPHIL # BLD AUTO: 0.1 10E3/UL (ref 0–0.7)
EOSINOPHIL NFR BLD AUTO: 3 %
ERYTHROCYTE [DISTWIDTH] IN BLOOD BY AUTOMATED COUNT: 15.8 % (ref 10–15)
FERRITIN SERPL-MCNC: 5372 NG/ML (ref 31–409)
GLUCOSE SERPL-MCNC: 122 MG/DL (ref 70–99)
HCO3 SERPL-SCNC: 26 MMOL/L (ref 22–29)
HCT VFR BLD AUTO: 23.5 % (ref 40–53)
HGB BLD-MCNC: 7.9 G/DL (ref 13.3–17.7)
IMM GRANULOCYTES # BLD: 0 10E3/UL
IMM GRANULOCYTES NFR BLD: 1 %
LYMPHOCYTES # BLD AUTO: 1 10E3/UL (ref 0.8–5.3)
LYMPHOCYTES NFR BLD AUTO: 46 %
MCH RBC QN AUTO: 29.6 PG (ref 26.5–33)
MCHC RBC AUTO-ENTMCNC: 33.6 G/DL (ref 31.5–36.5)
MCV RBC AUTO: 88 FL (ref 78–100)
MONOCYTES # BLD AUTO: 0.2 10E3/UL (ref 0–1.3)
MONOCYTES NFR BLD AUTO: 7 %
NEUTROPHILS # BLD AUTO: 0.9 10E3/UL (ref 1.6–8.3)
NEUTROPHILS NFR BLD AUTO: 42 %
NRBC # BLD AUTO: 0 10E3/UL
NRBC BLD AUTO-RTO: 0 /100
PLATELET # BLD AUTO: 126 10E3/UL (ref 150–450)
POTASSIUM SERPL-SCNC: 4.2 MMOL/L (ref 3.4–5.3)
PROT SERPL-MCNC: 7 G/DL (ref 6.4–8.3)
RBC # BLD AUTO: 2.67 10E6/UL (ref 4.4–5.9)
SODIUM SERPL-SCNC: 138 MMOL/L (ref 135–145)
URATE SERPL-MCNC: 4.1 MG/DL (ref 3.4–7)
WBC # BLD AUTO: 2.3 10E3/UL (ref 4–11)

## 2025-01-17 PROCEDURE — 84550 ASSAY OF BLOOD/URIC ACID: CPT | Performed by: STUDENT IN AN ORGANIZED HEALTH CARE EDUCATION/TRAINING PROGRAM

## 2025-01-17 PROCEDURE — 82728 ASSAY OF FERRITIN: CPT | Performed by: STUDENT IN AN ORGANIZED HEALTH CARE EDUCATION/TRAINING PROGRAM

## 2025-01-17 PROCEDURE — 84460 ALANINE AMINO (ALT) (SGPT): CPT | Performed by: STUDENT IN AN ORGANIZED HEALTH CARE EDUCATION/TRAINING PROGRAM

## 2025-01-17 PROCEDURE — 36415 COLL VENOUS BLD VENIPUNCTURE: CPT | Performed by: STUDENT IN AN ORGANIZED HEALTH CARE EDUCATION/TRAINING PROGRAM

## 2025-01-17 PROCEDURE — 85025 COMPLETE CBC W/AUTO DIFF WBC: CPT | Performed by: STUDENT IN AN ORGANIZED HEALTH CARE EDUCATION/TRAINING PROGRAM

## 2025-01-17 PROCEDURE — G0463 HOSPITAL OUTPT CLINIC VISIT: HCPCS | Performed by: STUDENT IN AN ORGANIZED HEALTH CARE EDUCATION/TRAINING PROGRAM

## 2025-01-17 PROCEDURE — 82310 ASSAY OF CALCIUM: CPT | Performed by: STUDENT IN AN ORGANIZED HEALTH CARE EDUCATION/TRAINING PROGRAM

## 2025-01-17 RX ORDER — BUPROPION HYDROCHLORIDE 150 MG/1
150 TABLET, EXTENDED RELEASE ORAL DAILY
COMMUNITY
Start: 2024-12-12

## 2025-01-17 RX ORDER — LEVOTHYROXINE SODIUM 125 UG/1
125 TABLET ORAL DAILY
COMMUNITY
Start: 2024-12-19

## 2025-01-17 RX ORDER — LENALIDOMIDE 10 MG/1
10 CAPSULE ORAL DAILY
COMMUNITY
Start: 2025-01-06

## 2025-01-17 ASSESSMENT — PAIN SCALES - GENERAL: PAINLEVEL_OUTOF10: NO PAIN (0)

## 2025-01-17 NOTE — PROGRESS NOTES
[facility]  HEMATOLOGY & ONCOLOGY  FOLLOW UP VISIT    PATIENT NAME: Willie Charles          MRN # [id number]  YOB: 1960  DATE OF VISIT:  Jan 17, 2025            REFERRING PROVIDER:   Mr. Willie Charles was seen at the request of Arely for further evaluation and/or management.     History of Presenting Illness  Mr. Willie Charles is a pleasant 63 year old male with a past medical history significant for hyperthyroisidism s/p radioactive iodine, HTN  and MDS dx in 7/2023 after he had fatigue for several months found to have bicytopenia with WBC 3.3 and hemoglobin 7.0 and platelets 308 that led to a bone marrow biopsy on 7/19/2023 that showed hypocellular marrow with 10 to 20% cellularity and 2% blasts with megakaryocytic hyperplasia with dysplasia.  Cytogenetics showed 5 q. deletion and NGS showed BCORL1 with a VAF of 3%.  He was started on lenalidomide 5 mg daily on 8/11/2023.  His anemia worsened after a few days and he was then started on concurrent azacitidine in addition to Revlimid on 8/28/2023.  As per records his counts trended down by 9/2023 and his Revlimid was discontinued.  He was continued on 2 more cycles of azacitidine until October 2023.  He did see Dr. Squires at [place] in 11/2023 and was recommended to restart Revlimid starting at 2.5 mg dosing every other day for 3 weeks on and 1 week off.  He was started on 2.5 mg 2 times a week  and has been tolerating that for the last 3 months.He has continued to require about 2-4 prbcs in a month.  Denies any fevers or chills or ongoing bleeding from anywhere.    He has now established with the [facility] and has now been increased to lenalidomide 5mg daily for 3 weeks on 1 week off.     Subjective  Patient is here with wife and son. He completed another cycle of lenalidomide 10mg daily for 3 weeks on 1 week off. This was his 5th cycle of full dose lenalidomide. Tolerated this well, no rashes or brain fog as before. This  cycle he still got only 3  units of transfusion.  Denies any bleeding from anywhere including BRBPR/Melena. Says bone marrow biopsy went well.     Past Medical History  No past medical history on file.    Family History  No family history on file.    Social History  Smoking: Never  Alcohol use: occasionally drinks alcohol  Status:   Children/grandchildren: Did not ask.   Occupation: Housing  , .     [employer]  Current Outpatient Medications  Current Outpatient Medications   Medication Sig Dispense Refill    acyclovir (ZOVIRAX) 400 MG tablet Take 400 mg by mouth 2 times daily      aspirin 81 MG EC tablet Take 81 mg by mouth daily      cyanocobalamin (VITAMIN B-12) 1000 MCG tablet Take 1,000 mcg by mouth daily      diltiazem ER COATED BEADS (CARDIZEM CD/CARTIA XT) 180 MG 24 hr capsule Take 180 mg by mouth daily      folic acid (FOLVITE) 1 MG tablet Take 1 tablet (1 mg) by mouth daily. 30 tablet 5    levothyroxine (SYNTHROID/LEVOTHROID) 150 MCG tablet 125 mcg.      loratadine (CLARITIN) 10 MG tablet Take 10 mg by mouth daily      metFORMIN (GLUCOPHAGE XR) 500 MG 24 hr tablet Take 500 mg by mouth daily (with dinner)      metoprolol succinate ER (TOPROL XL) 50 MG 24 hr tablet Take 100 mg by mouth daily.      montelukast (SINGULAIR) 10 MG tablet Take 10 mg by mouth at bedtime      REVLIMID 5 MG CAPS capsule Take 5 mg by mouth daily      sildenafil (REVATIO) 20 MG tablet Take 1 tab on empty stomach one hour prior to sexual activity; may increase to a max of 5 tabs         Allergies  Allergies   Allergen Reactions    Benazepril Other (See Comments)     Critical    Penicillin V Rash       Review of systems  A complete ROS was performed and was negative except as mentioned in HPI    Physical Exam  Vitals:    01/17/25 1046   BP: 137/65   BP Location: Right arm   Patient Position: Sitting   Cuff Size: Adult Large   Pulse: 64   Resp: 18   Temp: 97.7  F (36.5  C)   TempSrc: Oral   SpO2: 97%   Weight:  "88.5 kg (195 lb 1.6 oz)       Wt Readings from Last 3 Encounters:   25 88.5 kg (195 lb 1.6 oz)   25 87.7 kg (193 lb 6.4 oz)   24 85.7 kg (189 lb)     ECO   male sitting in chair in NAD  Video visit limited exam.   Sitting comfortably and breathing with no acceszo    Labs and Imaging    Sodium   Date Value Ref Range Status   2025 138 135 - 145 mmol/L Final    ,   Potassium   Date Value Ref Range Status   2025 4.2 3.4 - 5.3 mmol/L Final      Creatinine   Date Value Ref Range Status   2025 0.80 0.67 - 1.17 mg/dL Final       No results found for: \"LDH\"      Uric Acid   Date Value Ref Range Status   2025 4.1 3.4 - 7.0 mg/dL Final       WBC Count   Date Value Ref Range Status   2025 2.3 (L) 4.0 - 11.0 10e3/uL Final   ,   Hemoglobin   Date Value Ref Range Status   2025 7.9 (L) 13.3 - 17.7 g/dL Final   ],   Platelet Count   Date Value Ref Range Status   2025 126 (L) 150 - 450 10e3/uL Final   ,    MCV   Date Value Ref Range Status   2025 88 78 - 100 fL Final         EPO level was 1213 on 2023.        Bone marrow biopsy 2023  FINAL DIAGNOSIS   Peripheral blood, bone marrow aspirate, biopsy and clot sections ([id number]; 2023):     1. Myelodysplastic syndrome with isolated del(5q).     2. Two small monotypic B-cell populations (3% and 11% of total events) of uncertain significance, reportedly   detected by flow cytometric analysis. See comment.     COMMENT   The significance of the small monotypic B-cell populations identified by flow cytometric analysis is uncertain, as   diagnostic features of lymphoma are not seen by morphology or demonstrated by immunohistochemistry. These findings may   represent monoclonal B-cell lymphocytosis, although minimal bone marrow involvement by B-cell lymphoma cannot be   excluded. Clinical and radiographic correlation is recommended.  A lymph node or other extramedullary tissue   biopsy is " suggested if lymphoma is clinically and/or radiographically suspected.   Cytogenetics 5q deletion.   NGS BCORL1 VAF 3%      Bone marrow 2/21/2024  Peripheral blood, bone marrow aspirate, touch imprint, core biopsy, and clot:     -  Pancytopenia.     -  Inadequate bone marrow aspirate, core biopsy and clot sections.      -  The flow cytometry ([id number]) of the bone marrow shows approximately 3% monotypic B-cells positive for CD5, CD19, CD20, CD23,  and lambda, approximately 14% monotypic B-cells positive for CD5 (variable), CD19, CD20 (dim to negative), CD23 (dim), CD38 (parital) and , with no definitive light chain expression, and no increased blast population identified.   The marrow aspirate is hemodilute. The clot sections show no marrow tissue. The biopsy core shows a tiny area (<5% core sections) with a cellular (~10%) bone marrow showing tri-lineage cells including clusters of small hypolobated megakaryocytes. The immunohistochemistry on the biopsy sections appears to show increased megakaryocytes and no definitive diagnostic features of lymphoma involvement. The presence of increased small hypolobated megakaryocytes suggests persistent disease of patient's known MDS with 5q deletion. Chromosome studies show that although 20 metaphases are routinely examined, only 1 was identified and appeared normal.   The significance of the two populations of monotypic B-cells identified by flow cytometry is uncertain, as diagnostic features of lymphoma are not seen by morphology or immunohistochemistry in this inadequate marrow biopsy specimen. These findings may represent monoclonal B-cell lymphocytosis of undetermined significance or involvement of the peripheral blood by a B-cell lymphoma, although bone marrow involvement by B-cell lymphoma cannot be excluded.     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 Willie Charles is a 64 year old male who is here for further evaluation and/or management of MDS.    Oncology:  MDS with 5q deletion  WHO Classification: MDS w low blasts and 5q deletion  Date of diagnosis: 7/19/2023  Revised IPSS score: 2.5 Low  Molecular IPSS: -0.42 Moderate low  Bone Marrow Blast %: 2  Cytogenetics: 5q del  Molecular: BCORL1 VAF 3%  Past Treatment: lenalidomide 5mg on 8/11/2023 to 9/2023 with addition of azacitidine since 8/28/2023 until 10/2023.   Current Treatment: Restarted lenalidomide 2.5mg twice a week for 3 weeks on 1 week off since 11/2023. Off since 3/12. Last cycle lenalidomide 10mg starting 8/28. Next cycle lenalidomide 10mg on 9/30. Frank 10mg on 11/4. Frank 10 on 12/30 and Frank 10mg on 1/14    Had an admission 4/18 from 4/22 and stopped lenalidomide on 4/17. Completed 3 weeks on and 1 week of of lenalidomide and restarted on 6/1 for a new cycle.     I discussed the pathophysiology and natural history of MDS with Mr. Willie Charles today. We went over the current prognostic models and scoring systems that are used in clinical practice as well as the potential for disease evolution into acute myeloid leukemia. We went over the current FDA approved treatments for myelodysplastic syndromes and covered that the only curative option is through an allogeneic hematopoietic cell transplantation. His disease is  Moderate low  risk and currently BMT is not recommended.     We discussed that MDS with 5 q. responds well to lenalidomide and that he is not received adequate amount of treatment with lenalidomide.  Given that he is tolerating lenalidomide at this very low minimal dose I recommended that he increase the lenalidomide to 2.5 mg 5 times a week for 3 weeks on and then 1 week off.  If he  continues to tolerate this well we can continue increasing the dose to eventually reach 10 mg 3 weeks on and 1 week off.  He can possibly be started on other agents if his anemia is not responding to lenalidomide including agents like Luspatercept.    He had a recent admission for neutropenic fever s/p antibiotics without blood cultures showing anything.  He did have diffuse ST elevations concerning for stefan/pericarditis.  Course was complicated by A-fib with RVR.  Not sure if the troponin leak was secondary to the RVR.  Though there are rare cases of arrhythmias with lenalidomide doubt if this was related to it.    He has now tolerated a full cycle of lenalidomide 2.5mg daily for 3 weeks and 1 week off.  We increased his lenalidomide to 5mg he has tolerated for 2 cycles with minimal rash that disappeared on its own without any treatment.     He tolerated the lenalidomide 10 mg for 3 weeks on and 1 week off starting 8/28. He has now finished another cycle that started on 9/30 and another cycle from 11/4   Last ANC was 700 on 11/29 and he has not had any infectious complications. We will delay his next cycle to 12/9. Bone marrow around 1/9. He marrow shows improvement in cellularity to normal levels and less dysplasia, however not directly compared as his previous slides were not read by our pathologists. We will await cytogenetic results as as many as >50% people had cytogenetic response in the original MDS lenalidomide study. People had hematologic responses early but there were responders upto >40 weeks into treatment. That said last cycle there was only 3 RBC transfusions that he received  compared to 4 otherwise. NCCN recommends atleast 3-6 months of lenalidomide. We will document transfusion requirement for this cycle and decide discontinuation vs addition of EPO. Or switch to Imetelstat vs luspatercept.  Given last EPO level in 3/2024 was high at >3k my preference would be to start imetelstat.     If ANC  continues to be below 0.5 for more than 7 days and he continues to have recurrent infections then we can consider prophylactic antibiotics.     Next cycle on 12/9. Will do 21 days of david 10 and 2 weeks off.     #Elevated ferritin 1676------> 5372  - STOPPED Deferasirox.   - Given further elevation of ferritin will obtain a quantification of liver iron with MRI-STIR  sequencing ( ferriscan) of liver   - Once this is done can consider deferoxamine after a through discussion with patient that there is no overall survival benefit and possible liver and kidney toxicity.     Plan:  - c/w lenalidomide to 10mg q day for 3 weeks on and 1 week off. On going cycle since 1/14.   - RTC with me on 2/7 with labs prior. Okay to take NP slot.   - c/w twice weekly labs and as needed transfusions at local oncologist.  - If ANC trends below 500 consistently for more than 1 week can start levofloxacin as prophylaxis.     Hematology:  Anemia/Thrombocytopenia:   Transfuse leukocyte reduced and irradiated blood products: 1 unit pRBC if hgb is 7.0-7.9, 2 units pRBCs if Hgb 6.0-6.9 g/dl, 1 unit platelets if PLT count is <20,000 or <50,000 with clinical bleeding.    Immunocompromised:  As a result of his disease and/or previous treatment. Mr. Willie Charles is immunocompromised. his last ANC is >500 and there is no need for prophylactic antibiotics at this time.     Cardiac:  Mr. Willie Charles has no history of heart disease.     Renal:  Creatinine results reviewed today. Mr. Willie Charles does not have any renal disease.    Hepatic:  Liver function tests reviewed today.    Psychosocial:  Mr. Willie Charles is coping well with his diagnosis.     All other questions and concerns were addressed and answered to Mr. Willie Charles's satisfaction.    Today, I spent a total of 55 minutes of face-to-face and non face-to-face time with Mr. Willie Charles. Time spent included review and discussion of diagnostic test results,  patient counseling, and coordination of care.    The longitudinal plan of care for the diagnosis(es)/condition(s) as documented were addressed during this visit. Due to the added complexity in care, I will continue to support Willie in the subsequent management and with ongoing continuity of care.    Haroon Ellis MD    Division of Hematology, Oncology and Transplantation  [facility]  P: [phone]

## 2025-01-17 NOTE — NURSING NOTE
"Oncology Rooming Note    January 17, 2025 11:24 AM   Willie Charles is a 64 year old male who presents for:    Chief Complaint   Patient presents with    Blood Draw     Labs drawn via  by RN. VS taken.    Oncology Clinic Visit     Myelodysplastic syndrome       Initial Vitals: /65 (BP Location: Right arm, Patient Position: Sitting, Cuff Size: Adult Large)   Pulse 64   Temp 97.7  F (36.5  C) (Oral)   Resp 18   Wt 88.5 kg (195 lb 1.6 oz)   SpO2 97%   BMI 29.66 kg/m   Estimated body mass index is 29.66 kg/m  as calculated from the following:    Height as of 11/29/24: 1.727 m (5' 8\").    Weight as of this encounter: 88.5 kg (195 lb 1.6 oz). Body surface area is 2.06 meters squared.  No Pain (0) Comment: Data Unavailable   No LMP for male patient.  Allergies reviewed: Yes  Medications reviewed: Yes    Medications: Medication refills not needed today.  Pharmacy name entered into [employer]: [facility] - [place] - [address]    Frailty Screening:   Is the patient here for a new oncology consult visit in cancer care? 2. No      Clinical concerns:  Pt would like to discuss current iron count .       Cristina Gallegos              "

## 2025-01-17 NOTE — LETTER
1/17/2025      Willie Charles  [address]  [facility] [address]      Dear Colleague,    Thank you for referring your patient, Willie Charles, to the [facility]. Please see a copy of my visit note below.    [facility]  HEMATOLOGY & ONCOLOGY  FOLLOW UP VISIT    PATIENT NAME: Willie Charles          MRN # [id number]  YOB: 1960  DATE OF VISIT:  Jan 17, 2025            REFERRING PROVIDER:   Mr. Willie Charles was seen at the request of Arely for further evaluation and/or management.     History of Presenting Illness  Mr. Willie Charles is a pleasant 63 year old male with a past medical history significant for hyperthyroisidism s/p radioactive iodine, HTN  and MDS dx in 7/2023 after he had fatigue for several months found to have bicytopenia with WBC 3.3 and hemoglobin 7.0 and platelets 308 that led to a bone marrow biopsy on 7/19/2023 that showed hypocellular marrow with 10 to 20% cellularity and 2% blasts with megakaryocytic hyperplasia with dysplasia.  Cytogenetics showed 5 q. deletion and NGS showed BCORL1 with a VAF of 3%.  He was started on lenalidomide 5 mg daily on 8/11/2023.  His anemia worsened after a few days and he was then started on concurrent azacitidine in addition to Revlimid on 8/28/2023.  As per records his counts trended down by 9/2023 and his Revlimid was discontinued.  He was continued on 2 more cycles of azacitidine until October 2023.  He did see Dr. Squires at [place] in 11/2023 and was recommended to restart Revlimid starting at 2.5 mg dosing every other day for 3 weeks on and 1 week off.  He was started on 2.5 mg 2 times a week  and has been tolerating that for the last 3 months.He has continued to require about 2-4 prbcs in a month.  Denies any fevers or chills or ongoing bleeding from anywhere.    He has now established with the [facility] clinic and has now been increased to lenalidomide 5mg daily for 3 weeks on 1 week off.      Subjective  Patient is here with wife and son. He completed another cycle of lenalidomide 10mg daily for 3 weeks on 1 week off. This was his 5th cycle of full dose lenalidomide. Tolerated this well, no rashes or brain fog as before. This cycle he still got only 3  units of transfusion.  Denies any bleeding from anywhere including BRBPR/Melena. Says bone marrow biopsy went well.     Past Medical History  No past medical history on file.    Family History  No family history on file.    Social History  Smoking: Never  Alcohol use: occasionally drinks alcohol  Status:   Children/grandchildren: Did not ask.   Occupation: Housing  , .     [employer]  Current Outpatient Medications  Current Outpatient Medications   Medication Sig Dispense Refill     acyclovir (ZOVIRAX) 400 MG tablet Take 400 mg by mouth 2 times daily       aspirin 81 MG EC tablet Take 81 mg by mouth daily       cyanocobalamin (VITAMIN B-12) 1000 MCG tablet Take 1,000 mcg by mouth daily       diltiazem ER COATED BEADS (CARDIZEM CD/CARTIA XT) 180 MG 24 hr capsule Take 180 mg by mouth daily       folic acid (FOLVITE) 1 MG tablet Take 1 tablet (1 mg) by mouth daily. 30 tablet 5     levothyroxine (SYNTHROID/LEVOTHROID) 150 MCG tablet 125 mcg.       loratadine (CLARITIN) 10 MG tablet Take 10 mg by mouth daily       metFORMIN (GLUCOPHAGE XR) 500 MG 24 hr tablet Take 500 mg by mouth daily (with dinner)       metoprolol succinate ER (TOPROL XL) 50 MG 24 hr tablet Take 100 mg by mouth daily.       montelukast (SINGULAIR) 10 MG tablet Take 10 mg by mouth at bedtime       REVLIMID 5 MG CAPS capsule Take 5 mg by mouth daily       sildenafil (REVATIO) 20 MG tablet Take 1 tab on empty stomach one hour prior to sexual activity; may increase to a max of 5 tabs         Allergies  Allergies   Allergen Reactions     Benazepril Other (See Comments)     Critical     Penicillin V Rash       Review of systems  A complete ROS was performed and was  "negative except as mentioned in HPI    Physical Exam  Vitals:    25 1046   BP: 137/65   BP Location: Right arm   Patient Position: Sitting   Cuff Size: Adult Large   Pulse: 64   Resp: 18   Temp: 97.7  F (36.5  C)   TempSrc: Oral   SpO2: 97%   Weight: 88.5 kg (195 lb 1.6 oz)       Wt Readings from Last 3 Encounters:   25 88.5 kg (195 lb 1.6 oz)   25 87.7 kg (193 lb 6.4 oz)   24 85.7 kg (189 lb)     ECO   male sitting in chair in NAD  Video visit limited exam.   Sitting comfortably and breathing with no acceszo    Labs and Imaging    Sodium   Date Value Ref Range Status   2025 138 135 - 145 mmol/L Final    ,   Potassium   Date Value Ref Range Status   2025 4.2 3.4 - 5.3 mmol/L Final      Creatinine   Date Value Ref Range Status   2025 0.80 0.67 - 1.17 mg/dL Final       No results found for: \"LDH\"      Uric Acid   Date Value Ref Range Status   2025 4.1 3.4 - 7.0 mg/dL Final       WBC Count   Date Value Ref Range Status   2025 2.3 (L) 4.0 - 11.0 10e3/uL Final   ,   Hemoglobin   Date Value Ref Range Status   2025 7.9 (L) 13.3 - 17.7 g/dL Final   ],   Platelet Count   Date Value Ref Range Status   2025 126 (L) 150 - 450 10e3/uL Final   ,    MCV   Date Value Ref Range Status   2025 88 78 - 100 fL Final         EPO level was 1213 on 2023.        Bone marrow biopsy 2023  FINAL DIAGNOSIS   Peripheral blood, bone marrow aspirate, biopsy and clot sections ([id number]; 2023):     1. Myelodysplastic syndrome with isolated del(5q).     2. Two small monotypic B-cell populations (3% and 11% of total events) of uncertain significance, reportedly   detected by flow cytometric analysis. See comment.     COMMENT   The significance of the small monotypic B-cell populations identified by flow cytometric analysis is uncertain, as   diagnostic features of lymphoma are not seen by morphology or demonstrated by immunohistochemistry. These " findings may   represent monoclonal B-cell lymphocytosis, although minimal bone marrow involvement by B-cell lymphoma cannot be   excluded. Clinical and radiographic correlation is recommended.  A lymph node or other extramedullary tissue   biopsy is suggested if lymphoma is clinically and/or radiographically suspected.   Cytogenetics 5q deletion.   NGS BCORL1 VAF 3%      Bone marrow 2/21/2024  Peripheral blood, bone marrow aspirate, touch imprint, core biopsy, and clot:     -  Pancytopenia.     -  Inadequate bone marrow aspirate, core biopsy and clot sections.      -  The flow cytometry ([id number]) of the bone marrow shows approximately 3% monotypic B-cells positive for CD5, CD19, CD20, CD23,  and lambda, approximately 14% monotypic B-cells positive for CD5 (variable), CD19, CD20 (dim to negative), CD23 (dim), CD38 (parital) and , with no definitive light chain expression, and no increased blast population identified.   The marrow aspirate is hemodilute. The clot sections show no marrow tissue. The biopsy core shows a tiny area (<5% core sections) with a cellular (~10%) bone marrow showing tri-lineage cells including clusters of small hypolobated megakaryocytes. The immunohistochemistry on the biopsy sections appears to show increased megakaryocytes and no definitive diagnostic features of lymphoma involvement. The presence of increased small hypolobated megakaryocytes suggests persistent disease of patient's known MDS with 5q deletion. Chromosome studies show that although 20 metaphases are routinely examined, only 1 was identified and appeared normal.   The significance of the two populations of monotypic B-cells identified by flow cytometry is uncertain, as diagnostic features of lymphoma are not seen by morphology or immunohistochemistry in this inadequate marrow biopsy specimen. These findings may represent monoclonal B-cell lymphocytosis of undetermined significance or involvement of the  peripheral blood by a B-cell lymphoma, although bone marrow involvement by B-cell lymphoma cannot be excluded.     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 Willie Charles is a 64 year old male who is here for further evaluation and/or management of MDS.    Oncology:  MDS with 5q deletion  WHO Classification: MDS w low blasts and 5q deletion  Date of diagnosis: 7/19/2023  Revised IPSS score: 2.5 Low  Molecular IPSS: -0.42 Moderate low  Bone Marrow Blast %: 2  Cytogenetics: 5q del  Molecular: BCORL1 VAF 3%  Past Treatment: lenalidomide 5mg on 8/11/2023 to 9/2023 with addition of azacitidine since 8/28/2023 until 10/2023.   Current Treatment: Restarted lenalidomide 2.5mg twice a week for 3 weeks on 1 week off since 11/2023. Off since 3/12. Last cycle lenalidomide 10mg starting 8/28. Next cycle lenalidomide 10mg on 9/30. Frank 10mg on 11/4. Frank 10 on 12/30 and Frank 10mg on 1/14    Had an admission 4/18 from 4/22 and stopped lenalidomide on 4/17. Completed 3 weeks on and 1 week of of lenalidomide and restarted on 6/1 for a new cycle.     I discussed the pathophysiology and natural history of MDS with Mr. Willie Charles today. We went over the current prognostic models and scoring systems that are used in clinical practice as well as the potential for disease evolution into acute myeloid leukemia. We went over the current FDA approved treatments for myelodysplastic syndromes and covered that the only curative option is through an allogeneic hematopoietic cell transplantation. His disease is  Moderate low  risk and currently BMT is not recommended.     We discussed that MDS with 5 q. responds well to lenalidomide and that he is  not received adequate amount of treatment with lenalidomide.  Given that he is tolerating lenalidomide at this very low minimal dose I recommended that he increase the lenalidomide to 2.5 mg 5 times a week for 3 weeks on and then 1 week off.  If he continues to tolerate this well we can continue increasing the dose to eventually reach 10 mg 3 weeks on and 1 week off.  He can possibly be started on other agents if his anemia is not responding to lenalidomide including agents like Luspatercept.    He had a recent admission for neutropenic fever s/p antibiotics without blood cultures showing anything.  He did have diffuse ST elevations concerning for stefan/pericarditis.  Course was complicated by A-fib with RVR.  Not sure if the troponin leak was secondary to the RVR.  Though there are rare cases of arrhythmias with lenalidomide doubt if this was related to it.    He has now tolerated a full cycle of lenalidomide 2.5mg daily for 3 weeks and 1 week off.  We increased his lenalidomide to 5mg he has tolerated for 2 cycles with minimal rash that disappeared on its own without any treatment.     He tolerated the lenalidomide 10 mg for 3 weeks on and 1 week off starting 8/28. He has now finished another cycle that started on 9/30 and another cycle from 11/4   Last ANC was 700 on 11/29 and he has not had any infectious complications. We will delay his next cycle to 12/9. Bone marrow around 1/9. He marrow shows improvement in cellularity to normal levels and less dysplasia, however not directly compared as his previous slides were not read by our pathologists. We will await cytogenetic results as as many as >50% people had cytogenetic response in the original MDS lenalidomide study. People had hematologic responses early but there were responders upto >40 weeks into treatment. That said last cycle there was only 3 RBC transfusions that he received  compared to 4 otherwise. NCCN recommends atleast 3-6 months of lenalidomide. We  will document transfusion requirement for this cycle and decide discontinuation vs addition of EPO. Or switch to Imetelstat vs luspatercept.  Given last EPO level in 3/2024 was high at >3k my preference would be to start imetelstat.     If ANC continues to be below 0.5 for more than 7 days and he continues to have recurrent infections then we can consider prophylactic antibiotics.     Next cycle on 12/9. Will do 21 days of david 10 and 2 weeks off.     #Elevated ferritin 1676------> 5372  - STOPPED Deferasirox.   - Given further elevation of ferritin will obtain a quantification of liver iron with MRI-STIR  sequencing ( ferriscan) of liver   - Once this is done can consider deferoxamine after a through discussion with patient that there is no overall survival benefit and possible liver and kidney toxicity.     Plan:  - c/w lenalidomide to 10mg q day for 3 weeks on and 1 week off. On going cycle since 1/14.   - RTC with me on 2/7 with labs prior. Okay to take NP slot.   - c/w twice weekly labs and as needed transfusions at local oncologist.  - If ANC trends below 500 consistently for more than 1 week can start levofloxacin as prophylaxis.     Hematology:  Anemia/Thrombocytopenia:   Transfuse leukocyte reduced and irradiated blood products: 1 unit pRBC if hgb is 7.0-7.9, 2 units pRBCs if Hgb 6.0-6.9 g/dl, 1 unit platelets if PLT count is <20,000 or <50,000 with clinical bleeding.    Immunocompromised:  As a result of his disease and/or previous treatment. Mr. Willie Charles is immunocompromised. his last ANC is >500 and there is no need for prophylactic antibiotics at this time.     Cardiac:  Mr. Willie Charles has no history of heart disease.     Renal:  Creatinine results reviewed today. Mr. Willie Charles does not have any renal disease.    Hepatic:  Liver function tests reviewed today.    Psychosocial:  Mr. Willie Charles is coping well with his diagnosis.     All other questions and concerns were  addressed and answered to Mr. Willie Charles's satisfaction.    Today, I spent a total of 55 minutes of face-to-face and non face-to-face time with Mr. Willie Charles. Time spent included review and discussion of diagnostic test results, patient counseling, and coordination of care.    The longitudinal plan of care for the diagnosis(es)/condition(s) as documented were addressed during this visit. Due to the added complexity in care, I will continue to support Willie in the subsequent management and with ongoing continuity of care.    Haroon Ellis MD    Division of Hematology, Oncology and Transplantation  [facility]  P: [phone]      Again, thank you for allowing me to participate in the care of your patient.        Sincerely,        Haroon Ellis MD    Electronically signed

## 2025-01-17 NOTE — NURSING NOTE
Chief Complaint   Patient presents with    Blood Draw     Labs drawn via  by RN. VS taken.     Labs collected from venipuncture by RN. Vitals taken. Checked in for appointment(s).     Shima Jaime RN

## 2025-01-20 ENCOUNTER — TELEPHONE (OUTPATIENT)
Dept: ONCOLOGY | Facility: CLINIC | Age: 65
End: 2025-01-20
Payer: COMMERCIAL

## 2025-01-20 NOTE — TELEPHONE ENCOUNTER
Pathology slides ordered from [facility] for      Address :   [address] [place] [address]    2024 case [id number]  2023 case [id number]    [employer]    Brenda Syed

## 2025-01-21 DIAGNOSIS — D46.C MDS (MYELODYSPLASTIC SYNDROME) WITH 5Q DELETION (H): Primary | ICD-10-CM

## 2025-01-21 RX ORDER — ALBUTEROL SULFATE 0.83 MG/ML
2.5 SOLUTION RESPIRATORY (INHALATION)
OUTPATIENT
Start: 2025-02-07

## 2025-01-21 RX ORDER — DIPHENHYDRAMINE HYDROCHLORIDE 50 MG/ML
25 INJECTION INTRAMUSCULAR; INTRAVENOUS
Start: 2025-02-07

## 2025-01-21 RX ORDER — DIPHENHYDRAMINE HCL 25 MG
50 CAPSULE ORAL ONCE
Start: 2025-02-07

## 2025-01-21 RX ORDER — HEPARIN SODIUM (PORCINE) LOCK FLUSH IV SOLN 100 UNIT/ML 100 UNIT/ML
5 SOLUTION INTRAVENOUS
OUTPATIENT
Start: 2025-02-07

## 2025-01-21 RX ORDER — EPINEPHRINE 1 MG/ML
0.3 INJECTION, SOLUTION INTRAMUSCULAR; SUBCUTANEOUS EVERY 5 MIN PRN
OUTPATIENT
Start: 2025-02-07

## 2025-01-21 RX ORDER — METHYLPREDNISOLONE SODIUM SUCCINATE 40 MG/ML
40 INJECTION INTRAMUSCULAR; INTRAVENOUS
Start: 2025-02-07

## 2025-01-21 RX ORDER — HEPARIN SODIUM,PORCINE 10 UNIT/ML
5-20 VIAL (ML) INTRAVENOUS DAILY PRN
OUTPATIENT
Start: 2025-02-07

## 2025-01-21 RX ORDER — LORAZEPAM 2 MG/ML
0.5 INJECTION INTRAMUSCULAR EVERY 4 HOURS PRN
OUTPATIENT
Start: 2025-02-07

## 2025-01-21 RX ORDER — ALBUTEROL SULFATE 90 UG/1
1-2 INHALANT RESPIRATORY (INHALATION)
Start: 2025-02-07

## 2025-01-21 RX ORDER — HYDROCORTISONE SODIUM SUCCINATE 100 MG/2ML
100 INJECTION INTRAMUSCULAR; INTRAVENOUS ONCE
OUTPATIENT
Start: 2025-02-07 | End: 2025-02-07

## 2025-01-21 RX ORDER — DIPHENHYDRAMINE HYDROCHLORIDE 50 MG/ML
50 INJECTION INTRAMUSCULAR; INTRAVENOUS
Start: 2025-02-07

## 2025-01-21 RX ORDER — MEPERIDINE HYDROCHLORIDE 25 MG/ML
25 INJECTION INTRAMUSCULAR; INTRAVENOUS; SUBCUTANEOUS
OUTPATIENT
Start: 2025-02-07

## 2025-01-23 ENCOUNTER — PRE VISIT (OUTPATIENT)
Dept: ONCOLOGY | Facility: CLINIC | Age: 65
End: 2025-01-23
Payer: COMMERCIAL

## 2025-01-23 NOTE — TELEPHONE ENCOUNTER
Action January 23, 2025 2:55 PM    Action Taken Slides from [facility] received and taken to 5th floor path lab for review.    2/21/24: [id number]  7/19/23: [id number]

## 2025-01-24 ENCOUNTER — LAB (OUTPATIENT)
Dept: LAB | Facility: CLINIC | Age: 65
End: 2025-01-24
Payer: COMMERCIAL

## 2025-01-24 DIAGNOSIS — D46.9 MYELODYSPLASTIC SYNDROME, UNSPECIFIED (H): Primary | ICD-10-CM

## 2025-01-27 LAB
CULTURE HARVEST COMPLETE DATE: NORMAL
INTERPRETATION: NORMAL
ISCN: NORMAL
METHODS: NORMAL

## 2025-01-29 LAB
PATH REPORT.COMMENTS IMP SPEC: ABNORMAL
PATH REPORT.COMMENTS IMP SPEC: YES
PATH REPORT.FINAL DX SPEC: ABNORMAL
PATH REPORT.GROSS SPEC: ABNORMAL
PATH REPORT.MICROSCOPIC SPEC OTHER STN: ABNORMAL
PATH REPORT.RELEVANT HX SPEC: ABNORMAL
PATH REPORT.RELEVANT HX SPEC: ABNORMAL
PATH REPORT.SITE OF ORIGIN SPEC: ABNORMAL

## 2025-02-07 ENCOUNTER — ONCOLOGY VISIT (OUTPATIENT)
Dept: ONCOLOGY | Facility: CLINIC | Age: 65
End: 2025-02-07
Attending: STUDENT IN AN ORGANIZED HEALTH CARE EDUCATION/TRAINING PROGRAM
Payer: COMMERCIAL

## 2025-02-07 ENCOUNTER — PATIENT OUTREACH (OUTPATIENT)
Dept: ONCOLOGY | Facility: CLINIC | Age: 65
End: 2025-02-07

## 2025-02-07 ENCOUNTER — APPOINTMENT (OUTPATIENT)
Dept: LAB | Facility: CLINIC | Age: 65
End: 2025-02-07
Attending: STUDENT IN AN ORGANIZED HEALTH CARE EDUCATION/TRAINING PROGRAM
Payer: COMMERCIAL

## 2025-02-07 VITALS
WEIGHT: 201 LBS | TEMPERATURE: 97.8 F | BODY MASS INDEX: 30.56 KG/M2 | RESPIRATION RATE: 16 BRPM | DIASTOLIC BLOOD PRESSURE: 72 MMHG | SYSTOLIC BLOOD PRESSURE: 158 MMHG | HEART RATE: 70 BPM | OXYGEN SATURATION: 99 %

## 2025-02-07 DIAGNOSIS — D46.9 MDS (MYELODYSPLASTIC SYNDROME) (H): ICD-10-CM

## 2025-02-07 LAB
ALBUMIN SERPL BCG-MCNC: 3.7 G/DL (ref 3.5–5.2)
ALP SERPL-CCNC: 100 U/L (ref 40–150)
ALT SERPL W P-5'-P-CCNC: 49 U/L (ref 0–70)
ANION GAP SERPL CALCULATED.3IONS-SCNC: 6 MMOL/L (ref 7–15)
AST SERPL W P-5'-P-CCNC: 25 U/L (ref 0–45)
BASOPHILS # BLD AUTO: 0 10E3/UL (ref 0–0.2)
BASOPHILS NFR BLD AUTO: 1 %
BILIRUB SERPL-MCNC: 0.3 MG/DL
BUN SERPL-MCNC: 14.5 MG/DL (ref 8–23)
CALCIUM SERPL-MCNC: 8.6 MG/DL (ref 8.8–10.4)
CHLORIDE SERPL-SCNC: 108 MMOL/L (ref 98–107)
CREAT SERPL-MCNC: 0.88 MG/DL (ref 0.67–1.17)
DACRYOCYTES BLD QL SMEAR: SLIGHT
EGFRCR SERPLBLD CKD-EPI 2021: >90 ML/MIN/1.73M2
EOSINOPHIL # BLD AUTO: 0 10E3/UL (ref 0–0.7)
EOSINOPHIL NFR BLD AUTO: 2 %
ERYTHROCYTE [DISTWIDTH] IN BLOOD BY AUTOMATED COUNT: 16.6 % (ref 10–15)
GLUCOSE SERPL-MCNC: 181 MG/DL (ref 70–99)
HCO3 SERPL-SCNC: 26 MMOL/L (ref 22–29)
HCT VFR BLD AUTO: 24.3 % (ref 40–53)
HGB BLD-MCNC: 8 G/DL (ref 13.3–17.7)
IMM GRANULOCYTES # BLD: 0 10E3/UL
IMM GRANULOCYTES NFR BLD: 1 %
LYMPHOCYTES # BLD AUTO: 1 10E3/UL (ref 0.8–5.3)
LYMPHOCYTES NFR BLD AUTO: 49 %
MCH RBC QN AUTO: 28.4 PG (ref 26.5–33)
MCHC RBC AUTO-ENTMCNC: 32.9 G/DL (ref 31.5–36.5)
MCV RBC AUTO: 86 FL (ref 78–100)
MONOCYTES # BLD AUTO: 0.2 10E3/UL (ref 0–1.3)
MONOCYTES NFR BLD AUTO: 11 %
NEUTROPHILS # BLD AUTO: 0.8 10E3/UL (ref 1.6–8.3)
NEUTROPHILS NFR BLD AUTO: 37 %
NRBC # BLD AUTO: 0 10E3/UL
NRBC BLD AUTO-RTO: 0 /100
PLAT MORPH BLD: ABNORMAL
PLATELET # BLD AUTO: 134 10E3/UL (ref 150–450)
POTASSIUM SERPL-SCNC: 4.1 MMOL/L (ref 3.4–5.3)
PROT SERPL-MCNC: 6.9 G/DL (ref 6.4–8.3)
RBC # BLD AUTO: 2.82 10E6/UL (ref 4.4–5.9)
RBC MORPH BLD: ABNORMAL
SODIUM SERPL-SCNC: 140 MMOL/L (ref 135–145)
URATE SERPL-MCNC: 4.1 MG/DL (ref 3.4–7)
WBC # BLD AUTO: 2.1 10E3/UL (ref 4–11)

## 2025-02-07 PROCEDURE — 99215 OFFICE O/P EST HI 40 MIN: CPT | Performed by: STUDENT IN AN ORGANIZED HEALTH CARE EDUCATION/TRAINING PROGRAM

## 2025-02-07 PROCEDURE — 82040 ASSAY OF SERUM ALBUMIN: CPT | Performed by: STUDENT IN AN ORGANIZED HEALTH CARE EDUCATION/TRAINING PROGRAM

## 2025-02-07 PROCEDURE — 85025 COMPLETE CBC W/AUTO DIFF WBC: CPT | Performed by: STUDENT IN AN ORGANIZED HEALTH CARE EDUCATION/TRAINING PROGRAM

## 2025-02-07 PROCEDURE — 82728 ASSAY OF FERRITIN: CPT | Performed by: STUDENT IN AN ORGANIZED HEALTH CARE EDUCATION/TRAINING PROGRAM

## 2025-02-07 PROCEDURE — G0463 HOSPITAL OUTPT CLINIC VISIT: HCPCS | Performed by: STUDENT IN AN ORGANIZED HEALTH CARE EDUCATION/TRAINING PROGRAM

## 2025-02-07 PROCEDURE — G2211 COMPLEX E/M VISIT ADD ON: HCPCS | Performed by: STUDENT IN AN ORGANIZED HEALTH CARE EDUCATION/TRAINING PROGRAM

## 2025-02-07 PROCEDURE — 82668 ASSAY OF ERYTHROPOIETIN: CPT | Performed by: STUDENT IN AN ORGANIZED HEALTH CARE EDUCATION/TRAINING PROGRAM

## 2025-02-07 PROCEDURE — 36415 COLL VENOUS BLD VENIPUNCTURE: CPT | Performed by: STUDENT IN AN ORGANIZED HEALTH CARE EDUCATION/TRAINING PROGRAM

## 2025-02-07 PROCEDURE — 84550 ASSAY OF BLOOD/URIC ACID: CPT | Performed by: STUDENT IN AN ORGANIZED HEALTH CARE EDUCATION/TRAINING PROGRAM

## 2025-02-07 RX ORDER — HEPARIN SODIUM (PORCINE) LOCK FLUSH IV SOLN 100 UNIT/ML 100 UNIT/ML
3 SOLUTION INTRAVENOUS ONCE
Status: DISCONTINUED | OUTPATIENT
Start: 2025-02-07 | End: 2025-02-09 | Stop reason: HOSPADM

## 2025-02-07 RX ORDER — TAMSULOSIN HYDROCHLORIDE 0.4 MG/1
0.4 CAPSULE ORAL
COMMUNITY
Start: 2025-01-22

## 2025-02-07 ASSESSMENT — PAIN SCALES - GENERAL: PAINLEVEL_OUTOF10: NO PAIN (0)

## 2025-02-07 NOTE — NURSING NOTE
"Oncology Rooming Note    February 7, 2025 11:32 AM   Willie Charles is a 64 year old male who presents for:    Chief Complaint   Patient presents with    Blood Draw     Labs drawn from venipuncture by RN    Oncology Clinic Visit     Myelodysplastic syndrome     Initial Vitals: BP (!) 158/72 (BP Location: Right arm, Patient Position: Sitting, Cuff Size: Adult Regular)   Pulse 70   Temp 97.8  F (36.6  C) (Oral)   Resp 16   Wt 91.2 kg (201 lb)   SpO2 99%   BMI 30.56 kg/m   Estimated body mass index is 30.56 kg/m  as calculated from the following:    Height as of 11/29/24: 1.727 m (5' 8\").    Weight as of this encounter: 91.2 kg (201 lb). Body surface area is 2.09 meters squared.  No Pain (0) Comment: Data Unavailable   No LMP for male patient.  Allergies reviewed: Yes  Medications reviewed: Yes    Medications: Medication refills not needed today.  Pharmacy name entered into [facility]: [facility] - [place] - [address]    Frailty Screening:   Is the patient here for a new oncology consult visit in cancer care? 2. No      Clinical concerns: none.       Caitlin Hebert"

## 2025-02-07 NOTE — LETTER
2/7/2025      Willie Charles  [address]  [facility] [address]      Dear Colleague,    Thank you for referring your patient, Willie Charles, to the [facility]. Please see a copy of my visit note below.    [facility]  HEMATOLOGY & ONCOLOGY  FOLLOW UP VISIT    PATIENT NAME: Willie Charles          MRN # [id number]  YOB: 1960  DATE OF VISIT:  Feb 7, 2025            REFERRING PROVIDER:   Mr. Willie Charles was seen at the request of Arely for further evaluation and/or management.     History of Presenting Illness  Mr. Willie Charles is a pleasant 63 year old male with a past medical history significant for hyperthyroisidism s/p radioactive iodine, HTN  and MDS dx in 7/2023 after he had fatigue for several months found to have bicytopenia with WBC 3.3 and hemoglobin 7.0 and platelets 308 that led to a bone marrow biopsy on 7/19/2023 that showed hypocellular marrow with 10 to 20% cellularity and 2% blasts with megakaryocytic hyperplasia with dysplasia.  Cytogenetics showed 5 q. deletion and NGS showed BCORL1 with a VAF of 3%.  He was started on lenalidomide 5 mg daily on 8/11/2023.  His anemia worsened after a few days and he was then started on concurrent azacitidine in addition to Revlimid on 8/28/2023.  As per records his counts trended down by 9/2023 and his Revlimid was discontinued.  He was continued on 2 more cycles of azacitidine until October 2023.  He did see Dr. Squires at [place] in 11/2023 and was recommended to restart Revlimid starting at 2.5 mg dosing every other day for 3 weeks on and 1 week off.  He was started on 2.5 mg 2 times a week  and has been tolerating that for the last 3 months.He has continued to require about 2-4 prbcs in a month.  Denies any fevers or chills or ongoing bleeding from anywhere.    He has now established with the [facility] clinic and has now been increased to lenalidomide 5mg daily for 3 weeks on 1 week off.      Subjective  Patient is here with wife and son. He completed another cycle of lenalidomide 10mg daily for 3 weeks on 2/5/2025. This was his 6th cycle of full dose lenalidomide. Tolerated this well, no rashes or brain fog as before. This cycle he still got only 3 units of transfusion.  Denies any bleeding from anywhere including BRBPR/Melena.  Further molecular studies from his bone marrow biopsy is also back at this point of time.    Past Medical History  No past medical history on file.    Family History  No family history on file.    Social History  Smoking: Never  Alcohol use: occasionally drinks alcohol  Status:   Children/grandchildren: Did not ask.   Occupation: Housing  , .     [employer]  Current Outpatient Medications  Current Outpatient Medications   Medication Sig Dispense Refill     acyclovir (ZOVIRAX) 400 MG tablet Take 400 mg by mouth 2 times daily       aspirin 81 MG EC tablet Take 81 mg by mouth daily       buPROPion (WELLBUTRIN SR) 150 MG 12 hr tablet Take 150 mg by mouth daily.       cyanocobalamin (VITAMIN B-12) 1000 MCG tablet Take 1,000 mcg by mouth daily       diltiazem ER COATED BEADS (CARDIZEM CD/CARTIA XT) 180 MG 24 hr capsule Take 180 mg by mouth daily       folic acid (FOLVITE) 1 MG tablet Take 1 tablet (1 mg) by mouth daily. 30 tablet 5     levothyroxine (SYNTHROID/LEVOTHROID) 125 MCG tablet Take 125 mcg by mouth daily.       loratadine (CLARITIN) 10 MG tablet Take 10 mg by mouth daily       metFORMIN (GLUCOPHAGE XR) 500 MG 24 hr tablet Take 500 mg by mouth daily (with dinner)       metoprolol succinate ER (TOPROL XL) 50 MG 24 hr tablet Take 100 mg by mouth daily.       montelukast (SINGULAIR) 10 MG tablet Take 10 mg by mouth at bedtime       REVLIMID 10 MG CAPS capsule Take 10 mg by mouth daily       sildenafil (REVATIO) 20 MG tablet Take 1 tab on empty stomach one hour prior to sexual activity; may increase to a max of 5 tabs       tamsulosin (FLOMAX) 0.4  "MG capsule Take 0.4 mg by mouth.         Allergies  Allergies   Allergen Reactions     Benazepril Other (See Comments)     Critical     Penicillin V Rash       Review of systems  A complete ROS was performed and was negative except as mentioned in HPI    Physical Exam  Vitals:    25 1105   BP: (!) 158/72   BP Location: Right arm   Patient Position: Sitting   Cuff Size: Adult Regular   Pulse: 70   Resp: 16   Temp: 97.8  F (36.6  C)   TempSrc: Oral   SpO2: 99%   Weight: 91.2 kg (201 lb)       Wt Readings from Last 3 Encounters:   25 91.2 kg (201 lb)   25 88.5 kg (195 lb 1.6 oz)   25 87.7 kg (193 lb 6.4 oz)     ECO   male sitting in chair in NAD  Video visit limited exam.   Sitting comfortably and breathing with no acceszo    Labs and Imaging    Sodium   Date Value Ref Range Status   2025 138 135 - 145 mmol/L Final    ,   Potassium   Date Value Ref Range Status   2025 4.2 3.4 - 5.3 mmol/L Final      Creatinine   Date Value Ref Range Status   2025 0.80 0.67 - 1.17 mg/dL Final       No results found for: \"LDH\"      Uric Acid   Date Value Ref Range Status   2025 4.1 3.4 - 7.0 mg/dL Final       WBC Count   Date Value Ref Range Status   2025 2.1 (L) 4.0 - 11.0 10e3/uL Preliminary   ,   Hemoglobin   Date Value Ref Range Status   2025 8.0 (L) 13.3 - 17.7 g/dL Preliminary   ],   Platelet Count   Date Value Ref Range Status   2025 134 (L) 150 - 450 10e3/uL Preliminary   ,    MCV   Date Value Ref Range Status   2025 86 78 - 100 fL Preliminary         EPO level was 1213 on 2023.        Bone marrow biopsy 2023  FINAL DIAGNOSIS   Peripheral blood, bone marrow aspirate, biopsy and clot sections ([id number]; 2023):     1. Myelodysplastic syndrome with isolated del(5q).     2. Two small monotypic B-cell populations (3% and 11% of total events) of uncertain significance, reportedly   detected by flow cytometric analysis. See comment. "     COMMENT   The significance of the small monotypic B-cell populations identified by flow cytometric analysis is uncertain, as   diagnostic features of lymphoma are not seen by morphology or demonstrated by immunohistochemistry. These findings may   represent monoclonal B-cell lymphocytosis, although minimal bone marrow involvement by B-cell lymphoma cannot be   excluded. Clinical and radiographic correlation is recommended.  A lymph node or other extramedullary tissue   biopsy is suggested if lymphoma is clinically and/or radiographically suspected.   Cytogenetics 5q deletion.   NGS BCORL1 VAF 3%    Bone marrow 2/21/2024  Peripheral blood, bone marrow aspirate, touch imprint, core biopsy, and clot:     -  Pancytopenia.     -  Inadequate bone marrow aspirate, core biopsy and clot sections.      -  The flow cytometry ([id number]) of the bone marrow shows approximately 3% monotypic B-cells positive for CD5, CD19, CD20, CD23,  and lambda, approximately 14% monotypic B-cells positive for CD5 (variable), CD19, CD20 (dim to negative), CD23 (dim), CD38 (parital) and , with no definitive light chain expression, and no increased blast population identified.   The marrow aspirate is hemodilute. The clot sections show no marrow tissue. The biopsy core shows a tiny area (<5% core sections) with a cellular (~10%) bone marrow showing tri-lineage cells including clusters of small hypolobated megakaryocytes. The immunohistochemistry on the biopsy sections appears to show increased megakaryocytes and no definitive diagnostic features of lymphoma involvement. The presence of increased small hypolobated megakaryocytes suggests persistent disease of patient's known MDS with 5q deletion. Chromosome studies show that although 20 metaphases are routinely examined, only 1 was identified and appeared normal.   The significance of the two populations of monotypic B-cells identified by flow cytometry is uncertain, as diagnostic  features of lymphoma are not seen by morphology or immunohistochemistry in this inadequate marrow biopsy specimen. These findings may represent monoclonal B-cell lymphocytosis of undetermined significance or involvement of the peripheral blood by a B-cell lymphoma, although bone marrow involvement by B-cell lymphoma cannot be excluded.     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   The first clone is characterized by 46 chromosomes and a deletion of the long arm of one copy of chromosome 5. The second clone contains the same deletion of chromosome 5q with the addition of a translocation between the short arm of chromosome 1 and the long arm of chromosome 11, and a deletion within the long arm of one copy of chromosome 7.  Taken together these findings are consistent with the reported morphologic and immunophenotpic finding of recurrent/persistent myelodysplastic syndrome ([id number]) and indicate cytogenetic progression.   The following patient's previously characterized BCORL1 pathogenic mutation was not identified in the current bone marrow aspirate.  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     Assessment and Plan  Mr. Willie Charles is a 64 year old male who is here for further evaluation and/or management of MDS.    Oncology:  MDS with 5q deletion  WHO Classification: MDS w low blasts and 5q deletion  Date of diagnosis: 7/19/2023  Revised IPSS score: 2.5 Low  Molecular IPSS: -0.42 Moderate low  Bone Marrow Blast %: 2  Cytogenetics: 5q del  Molecular: BCORL1 VAF 3%  Past Treatment: lenalidomide 5mg on 8/11/2023 to  9/2023 with addition of azacitidine since 8/28/2023 until 10/2023.   Current Treatment: Restarted lenalidomide 2.5mg twice a week for 3 weeks on 1 week off since 11/2023. Off since 3/12. Last cycle lenalidomide 10mg starting 8/28. Next cycle lenalidomide 10mg on 9/30. Frank 10mg on 11/4. Frank 10 on 12/30 and Frank 10mg on 1/14    Had an admission 4/18 from 4/22 and stopped lenalidomide on 4/17. Completed 3 weeks on and 1 week of of lenalidomide and restarted on 6/1 for a new cycle.     I discussed the pathophysiology and natural history of MDS with Mr. Willie MACE Melvin today. We went over the current prognostic models and scoring systems that are used in clinical practice as well as the potential for disease evolution into acute myeloid leukemia. We went over the current FDA approved treatments for myelodysplastic syndromes and covered that the only curative option is through an allogeneic hematopoietic cell transplantation. His disease is  Moderate low  risk and currently BMT is not recommended.     We discussed that MDS with 5 q. responds well to lenalidomide and that he is not received adequate amount of treatment with lenalidomide.  Given that he is tolerating lenalidomide at this very low minimal dose I recommended that he increase the lenalidomide to 2.5 mg 5 times a week for 3 weeks on and then 1 week off.  If he continues to tolerate this well we can continue increasing the dose to eventually reach 10 mg 3 weeks on and 1 week off.  He can possibly be started on other agents if his anemia is not responding to lenalidomide including agents like Luspatercept.    He had a recent admission for neutropenic fever s/p antibiotics without blood cultures showing anything.  He did have diffuse ST elevations concerning for stefan/pericarditis.  Course was complicated by A-fib with RVR.  Not sure if the troponin leak was secondary to the RVR.  Though there are rare cases of arrhythmias with lenalidomide doubt if this was  related to it.    He has now tolerated a full cycle of lenalidomide 2.5mg daily for 3 weeks and 1 week off.  We increased his lenalidomide to 5mg he has tolerated for 2 cycles with minimal rash that disappeared on its own without any treatment.     He tolerated the lenalidomide 10 mg for 3 weeks on and 1 week off starting 8/28. He has now finished another cycle that started on 9/30 and another cycle from 11/4   Last ANC was 700 on 11/29 and he has not had any infectious complications. We will delay his next cycle to 12/9. Bone marrow around 1/9. He marrow shows improvement in cellularity to normal levels and less dysplasia, however not directly compared as his previous slides were not read by our pathologists. His cytogenetic studies have now finalized and they show a second clone with complex karyotype containing 5 q. deletion along with 1p, 11 q. and 7 q. Deletion.  This is highly concerning and consistent with the progression.    The complex karyotype worries me.  However the primary symptom that he is continuing to have is transfusion dependence and as we had previously discussed we will start him on imetelstat.  We will give it at least 3 to 4 months, if no response or any other signs of progression we will then switch to venetoclax and azacitidine as per [place] trial.  I will also send a referral to one of the BMT physicians to get him established with bone marrow transplant.    Will start imetelstat here ASAP.  After couple cycles we can then consider possibly looking at the facility in [place] being able to give I imetelstat there.    If ANC continues to be below 0.5 for more than 7 days and he continues to have recurrent infections then we can consider prophylactic antibiotics.     #Elevated ferritin 1676------> 5372  - STOPPED Deferasirox.   - Given further elevation of ferritin will obtain a quantification of liver iron with MRI-STIR  sequencing ( ferriscan) of liver   - Once this is done can consider  deferoxamine after a through discussion with patient that there is no overall survival benefit and possible liver and kidney toxicity.   - I have send a repeat ferritin again.     Plan:  - RTC with me on 3/12 with cycle 2 planned same day.   - Start Imetelstat infusions ASAP  - Will need MRI liver for iron quantification.   - c/w twice weekly labs and as needed transfusions at local oncologist.  - If ANC trends below 500 consistently for more than 1 week can start levofloxacin as prophylaxis.     Hematology:  Anemia/Thrombocytopenia:   Transfuse leukocyte reduced and irradiated blood products: 1 unit pRBC if hgb is 7.0-7.9, 2 units pRBCs if Hgb 6.0-6.9 g/dl, 1 unit platelets if PLT count is <20,000 or <50,000 with clinical bleeding.    Immunocompromised:  As a result of his disease and/or previous treatment. Mr. Willie Charles is immunocompromised. his last ANC is >500 and there is no need for prophylactic antibiotics at this time.     Cardiac:  Mr. Willie Charles has no history of heart disease.     Renal:  Creatinine results reviewed today. Mr. Willie Charles does not have any renal disease.    Hepatic:  Liver function tests reviewed today.    Psychosocial:  Mr. Willie Charles is coping well with his diagnosis.     All other questions and concerns were addressed and answered to Mr. Willie Charles's satisfaction.    Today, I spent a total of 40 minutes of face-to-face and non face-to-face time with Mr. Willie Charles. Time spent included review and discussion of diagnostic test results, patient counseling, and coordination of care.    The longitudinal plan of care for the diagnosis(es)/condition(s) as documented were addressed during this visit. Due to the added complexity in care, I will continue to support Willie in the subsequent management and with ongoing continuity of care.    Haroon Ellis MD    Division of Hematology, Oncology and Transplantation  [facility]  [place]  P: [phone]      Again, thank you for allowing me to participate in the care of your patient.        Sincerely,        Haroon Ellis MD    Electronically signed

## 2025-02-07 NOTE — NURSING NOTE
Chief Complaint   Patient presents with    Blood Draw     Labs drawn from venipuncture by RN     Port not due to be flushed at this time. Labs collected from venipuncture per patient request by RN. Vitals taken. Checked in for appointment(s).     Grisel Gorman RN

## 2025-02-07 NOTE — PROGRESS NOTES
[facility]: Cancer Care                                                                                          RNCC was asked by Dr. Ellis if patient is scheduled today for his Imetelstat. RNCC stated no, upon chart review a message was never sent to scheduling.     RNCC going to speak with patient and his family. RNCC apologized for the confusion. Patient would like to combine his Ferriscan with a infusion appointment.     RNCC stated an understanding.       Signature:  Sepideh Biswas RN

## 2025-02-07 NOTE — PROGRESS NOTES
[facility]  HEMATOLOGY & ONCOLOGY  FOLLOW UP VISIT    PATIENT NAME: Willie Charles          MRN # [id number]  YOB: 1960  DATE OF VISIT:  Feb 7, 2025            REFERRING PROVIDER:   Mr. Willie Charles was seen at the request of Arely for further evaluation and/or management.     History of Presenting Illness  Mr. Willie Charles is a pleasant 63 year old male with a past medical history significant for hyperthyroisidism s/p radioactive iodine, HTN  and MDS dx in 7/2023 after he had fatigue for several months found to have bicytopenia with WBC 3.3 and hemoglobin 7.0 and platelets 308 that led to a bone marrow biopsy on 7/19/2023 that showed hypocellular marrow with 10 to 20% cellularity and 2% blasts with megakaryocytic hyperplasia with dysplasia.  Cytogenetics showed 5 q. deletion and NGS showed BCORL1 with a VAF of 3%.  He was started on lenalidomide 5 mg daily on 8/11/2023.  His anemia worsened after a few days and he was then started on concurrent azacitidine in addition to Revlimid on 8/28/2023.  As per records his counts trended down by 9/2023 and his Revlimid was discontinued.  He was continued on 2 more cycles of azacitidine until October 2023.  He did see Dr. Squires at [place] in 11/2023 and was recommended to restart Revlimid starting at 2.5 mg dosing every other day for 3 weeks on and 1 week off.  He was started on 2.5 mg 2 times a week  and has been tolerating that for the last 3 months.He has continued to require about 2-4 prbcs in a month.  Denies any fevers or chills or ongoing bleeding from anywhere.    He has now established with the [facility] and has now been increased to lenalidomide 5mg daily for 3 weeks on 1 week off.     Subjective  Patient is here with wife and son. He completed another cycle of lenalidomide 10mg daily for 3 weeks on 2/5/2025. This was his 6th cycle of full dose lenalidomide. Tolerated this well, no rashes or brain fog as before. This cycle  he still got only 3 units of transfusion.  Denies any bleeding from anywhere including BRBPR/Melena.  Further molecular studies from his bone marrow biopsy is also back at this point of time.    Past Medical History  No past medical history on file.    Family History  No family history on file.    Social History  Smoking: Never  Alcohol use: occasionally drinks alcohol  Status:   Children/grandchildren: Did not ask.   Occupation: Housing  , .     [employer]  Current Outpatient Medications  Current Outpatient Medications   Medication Sig Dispense Refill    acyclovir (ZOVIRAX) 400 MG tablet Take 400 mg by mouth 2 times daily      aspirin 81 MG EC tablet Take 81 mg by mouth daily      buPROPion (WELLBUTRIN SR) 150 MG 12 hr tablet Take 150 mg by mouth daily.      cyanocobalamin (VITAMIN B-12) 1000 MCG tablet Take 1,000 mcg by mouth daily      diltiazem ER COATED BEADS (CARDIZEM CD/CARTIA XT) 180 MG 24 hr capsule Take 180 mg by mouth daily      folic acid (FOLVITE) 1 MG tablet Take 1 tablet (1 mg) by mouth daily. 30 tablet 5    levothyroxine (SYNTHROID/LEVOTHROID) 125 MCG tablet Take 125 mcg by mouth daily.      loratadine (CLARITIN) 10 MG tablet Take 10 mg by mouth daily      metFORMIN (GLUCOPHAGE XR) 500 MG 24 hr tablet Take 500 mg by mouth daily (with dinner)      metoprolol succinate ER (TOPROL XL) 50 MG 24 hr tablet Take 100 mg by mouth daily.      montelukast (SINGULAIR) 10 MG tablet Take 10 mg by mouth at bedtime      REVLIMID 10 MG CAPS capsule Take 10 mg by mouth daily      sildenafil (REVATIO) 20 MG tablet Take 1 tab on empty stomach one hour prior to sexual activity; may increase to a max of 5 tabs      tamsulosin (FLOMAX) 0.4 MG capsule Take 0.4 mg by mouth.         Allergies  Allergies   Allergen Reactions    Benazepril Other (See Comments)     Critical    Penicillin V Rash       Review of systems  A complete ROS was performed and was negative except as mentioned in  "HPI    Physical Exam  Vitals:    25 1105   BP: (!) 158/72   BP Location: Right arm   Patient Position: Sitting   Cuff Size: Adult Regular   Pulse: 70   Resp: 16   Temp: 97.8  F (36.6  C)   TempSrc: Oral   SpO2: 99%   Weight: 91.2 kg (201 lb)       Wt Readings from Last 3 Encounters:   25 91.2 kg (201 lb)   25 88.5 kg (195 lb 1.6 oz)   25 87.7 kg (193 lb 6.4 oz)     ECO   male sitting in chair in NAD  Video visit limited exam.   Sitting comfortably and breathing with no acceszo    Labs and Imaging    Sodium   Date Value Ref Range Status   2025 138 135 - 145 mmol/L Final    ,   Potassium   Date Value Ref Range Status   2025 4.2 3.4 - 5.3 mmol/L Final      Creatinine   Date Value Ref Range Status   2025 0.80 0.67 - 1.17 mg/dL Final       No results found for: \"LDH\"      Uric Acid   Date Value Ref Range Status   2025 4.1 3.4 - 7.0 mg/dL Final       WBC Count   Date Value Ref Range Status   2025 2.1 (L) 4.0 - 11.0 10e3/uL Preliminary   ,   Hemoglobin   Date Value Ref Range Status   2025 8.0 (L) 13.3 - 17.7 g/dL Preliminary   ],   Platelet Count   Date Value Ref Range Status   2025 134 (L) 150 - 450 10e3/uL Preliminary   ,    MCV   Date Value Ref Range Status   2025 86 78 - 100 fL Preliminary         EPO level was 1213 on 2023.        Bone marrow biopsy 2023  FINAL DIAGNOSIS   Peripheral blood, bone marrow aspirate, biopsy and clot sections ([id number]; 2023):     1. Myelodysplastic syndrome with isolated del(5q).     2. Two small monotypic B-cell populations (3% and 11% of total events) of uncertain significance, reportedly   detected by flow cytometric analysis. See comment.     COMMENT   The significance of the small monotypic B-cell populations identified by flow cytometric analysis is uncertain, as   diagnostic features of lymphoma are not seen by morphology or demonstrated by immunohistochemistry. These findings " may   represent monoclonal B-cell lymphocytosis, although minimal bone marrow involvement by B-cell lymphoma cannot be   excluded. Clinical and radiographic correlation is recommended.  A lymph node or other extramedullary tissue   biopsy is suggested if lymphoma is clinically and/or radiographically suspected.   Cytogenetics 5q deletion.   NGS BCORL1 VAF 3%    Bone marrow 2/21/2024  Peripheral blood, bone marrow aspirate, touch imprint, core biopsy, and clot:     -  Pancytopenia.     -  Inadequate bone marrow aspirate, core biopsy and clot sections.      -  The flow cytometry ([id number]) of the bone marrow shows approximately 3% monotypic B-cells positive for CD5, CD19, CD20, CD23,  and lambda, approximately 14% monotypic B-cells positive for CD5 (variable), CD19, CD20 (dim to negative), CD23 (dim), CD38 (parital) and , with no definitive light chain expression, and no increased blast population identified.   The marrow aspirate is hemodilute. The clot sections show no marrow tissue. The biopsy core shows a tiny area (<5% core sections) with a cellular (~10%) bone marrow showing tri-lineage cells including clusters of small hypolobated megakaryocytes. The immunohistochemistry on the biopsy sections appears to show increased megakaryocytes and no definitive diagnostic features of lymphoma involvement. The presence of increased small hypolobated megakaryocytes suggests persistent disease of patient's known MDS with 5q deletion. Chromosome studies show that although 20 metaphases are routinely examined, only 1 was identified and appeared normal.   The significance of the two populations of monotypic B-cells identified by flow cytometry is uncertain, as diagnostic features of lymphoma are not seen by morphology or immunohistochemistry in this inadequate marrow biopsy specimen. These findings may represent monoclonal B-cell lymphocytosis of undetermined significance or involvement of the peripheral blood by  a B-cell lymphoma, although bone marrow involvement by B-cell lymphoma cannot be excluded.     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   The first clone is characterized by 46 chromosomes and a deletion of the long arm of one copy of chromosome 5. The second clone contains the same deletion of chromosome 5q with the addition of a translocation between the short arm of chromosome 1 and the long arm of chromosome 11, and a deletion within the long arm of one copy of chromosome 7.  Taken together these findings are consistent with the reported morphologic and immunophenotpic finding of recurrent/persistent myelodysplastic syndrome ([id number]) and indicate cytogenetic progression.   The following patient's previously characterized BCORL1 pathogenic mutation was not identified in the current bone marrow aspirate.  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     Assessment and Plan  Mr. Willie Charles is a 64 year old male who is here for further evaluation and/or management of MDS.    Oncology:  MDS with 5q deletion  WHO Classification: MDS w low blasts and 5q deletion  Date of diagnosis: 7/19/2023  Revised IPSS score: 2.5 Low  Molecular IPSS: -0.42 Moderate low  Bone Marrow Blast %: 2  Cytogenetics: 5q del  Molecular: BCORL1 VAF 3%  Past Treatment: lenalidomide 5mg on 8/11/2023 to 9/2023 with addition of azacitidine since 8/28/2023 until 10/2023.   Current Treatment: Restarted lenalidomide 2.5mg twice a week for 3 weeks on 1 week off since 11/2023. Off since 3/12. Last cycle lenalidomide 10mg starting 8/28. Next cycle  lenalidomide 10mg on 9/30. Frank 10mg on 11/4. Frank 10 on 12/30 and Frank 10mg on 1/14    Had an admission 4/18 from 4/22 and stopped lenalidomide on 4/17. Completed 3 weeks on and 1 week of of lenalidomide and restarted on 6/1 for a new cycle.     I discussed the pathophysiology and natural history of MDS with Mr. Willie Charles today. We went over the current prognostic models and scoring systems that are used in clinical practice as well as the potential for disease evolution into acute myeloid leukemia. We went over the current FDA approved treatments for myelodysplastic syndromes and covered that the only curative option is through an allogeneic hematopoietic cell transplantation. His disease is  Moderate low  risk and currently BMT is not recommended.     We discussed that MDS with 5 q. responds well to lenalidomide and that he is not received adequate amount of treatment with lenalidomide.  Given that he is tolerating lenalidomide at this very low minimal dose I recommended that he increase the lenalidomide to 2.5 mg 5 times a week for 3 weeks on and then 1 week off.  If he continues to tolerate this well we can continue increasing the dose to eventually reach 10 mg 3 weeks on and 1 week off.  He can possibly be started on other agents if his anemia is not responding to lenalidomide including agents like Luspatercept.    He had a recent admission for neutropenic fever s/p antibiotics without blood cultures showing anything.  He did have diffuse ST elevations concerning for stefan/pericarditis.  Course was complicated by A-fib with RVR.  Not sure if the troponin leak was secondary to the RVR.  Though there are rare cases of arrhythmias with lenalidomide doubt if this was related to it.    He has now tolerated a full cycle of lenalidomide 2.5mg daily for 3 weeks and 1 week off.  We increased his lenalidomide to 5mg he has tolerated for 2 cycles with minimal rash that disappeared on its own without any treatment.      He tolerated the lenalidomide 10 mg for 3 weeks on and 1 week off starting 8/28. He has now finished another cycle that started on 9/30 and another cycle from 11/4   Last ANC was 700 on 11/29 and he has not had any infectious complications. We will delay his next cycle to 12/9. Bone marrow around 1/9. He marrow shows improvement in cellularity to normal levels and less dysplasia, however not directly compared as his previous slides were not read by our pathologists. His cytogenetic studies have now finalized and they show a second clone with complex karyotype containing 5 q. deletion along with 1p, 11 q. and 7 q. Deletion.  This is highly concerning and consistent with the progression.    The complex karyotype worries me.  However the primary symptom that he is continuing to have is transfusion dependence and as we had previously discussed we will start him on imetelstat.  We will give it at least 3 to 4 months, if no response or any other signs of progression we will then switch to venetoclax and azacitidine as per Tyler trial.  I will also send a referral to one of the BMT physicians to get him established with bone marrow transplant.    Will start imetelstat here ASAP.  After couple cycles we can then consider possibly looking at the facility in [place] being able to give I imetelstat there.    If ANC continues to be below 0.5 for more than 7 days and he continues to have recurrent infections then we can consider prophylactic antibiotics.     #Elevated ferritin 1676------> 5372  - STOPPED Deferasirox.   - Given further elevation of ferritin will obtain a quantification of liver iron with MRI-STIR  sequencing ( ferriscan) of liver   - Once this is done can consider deferoxamine after a through discussion with patient that there is no overall survival benefit and possible liver and kidney toxicity.   - I have send a repeat ferritin again.     Plan:  - RTC with me on 3/12 with cycle 2 planned same day.   -  Start Imetelstat infusions ASAP  - Will need MRI liver for iron quantification.   - c/w twice weekly labs and as needed transfusions at local oncologist.  - If ANC trends below 500 consistently for more than 1 week can start levofloxacin as prophylaxis.     Hematology:  Anemia/Thrombocytopenia:   Transfuse leukocyte reduced and irradiated blood products: 1 unit pRBC if hgb is 7.0-7.9, 2 units pRBCs if Hgb 6.0-6.9 g/dl, 1 unit platelets if PLT count is <20,000 or <50,000 with clinical bleeding.    Immunocompromised:  As a result of his disease and/or previous treatment. Mr. Willie Charles is immunocompromised. his last ANC is >500 and there is no need for prophylactic antibiotics at this time.     Cardiac:  Mr. Willie Charles has no history of heart disease.     Renal:  Creatinine results reviewed today. Mr. Willie Charles does not have any renal disease.    Hepatic:  Liver function tests reviewed today.    Psychosocial:  Mr. Willie Charles is coping well with his diagnosis.     All other questions and concerns were addressed and answered to Mr. Willie Charles's satisfaction.    Today, I spent a total of 40 minutes of face-to-face and non face-to-face time with Mr. Willie Charles. Time spent included review and discussion of diagnostic test results, patient counseling, and coordination of care.    The longitudinal plan of care for the diagnosis(es)/condition(s) as documented were addressed during this visit. Due to the added complexity in care, I will continue to support Willie in the subsequent management and with ongoing continuity of care.    Haroon Ellis MD    Division of Hematology, Oncology and Transplantation  [facility]  P: [phone]

## 2025-02-08 LAB — EPO SERPL-ACNC: 2504 MU/ML

## 2025-02-09 LAB — FERRITIN SERPL-MCNC: 4341 NG/ML (ref 31–409)

## 2025-02-11 DIAGNOSIS — D46.C MDS (MYELODYSPLASTIC SYNDROME) WITH 5Q DELETION (H): Primary | ICD-10-CM

## 2025-02-19 ENCOUNTER — INFUSION THERAPY VISIT (OUTPATIENT)
Dept: ONCOLOGY | Facility: CLINIC | Age: 65
End: 2025-02-19
Attending: STUDENT IN AN ORGANIZED HEALTH CARE EDUCATION/TRAINING PROGRAM
Payer: COMMERCIAL

## 2025-02-19 ENCOUNTER — APPOINTMENT (OUTPATIENT)
Dept: LAB | Facility: CLINIC | Age: 65
End: 2025-02-19
Attending: STUDENT IN AN ORGANIZED HEALTH CARE EDUCATION/TRAINING PROGRAM
Payer: COMMERCIAL

## 2025-02-19 VITALS
OXYGEN SATURATION: 99 % | HEIGHT: 69 IN | SYSTOLIC BLOOD PRESSURE: 144 MMHG | BODY MASS INDEX: 28.99 KG/M2 | RESPIRATION RATE: 18 BRPM | TEMPERATURE: 97.9 F | HEART RATE: 73 BPM | DIASTOLIC BLOOD PRESSURE: 80 MMHG | WEIGHT: 195.7 LBS

## 2025-02-19 DIAGNOSIS — D46.C MDS (MYELODYSPLASTIC SYNDROME) WITH 5Q DELETION (H): Primary | ICD-10-CM

## 2025-02-19 DIAGNOSIS — D46.9 MDS (MYELODYSPLASTIC SYNDROME) (H): ICD-10-CM

## 2025-02-19 LAB
ALBUMIN SERPL BCG-MCNC: 3.8 G/DL (ref 3.5–5.2)
ALP SERPL-CCNC: 105 U/L (ref 40–150)
ALT SERPL W P-5'-P-CCNC: 91 U/L (ref 0–70)
ANION GAP SERPL CALCULATED.3IONS-SCNC: 6 MMOL/L (ref 7–15)
AST SERPL W P-5'-P-CCNC: 41 U/L (ref 0–45)
BASOPHILS # BLD AUTO: 0 10E3/UL (ref 0–0.2)
BASOPHILS NFR BLD AUTO: 1 %
BILIRUB DIRECT SERPL-MCNC: 0.13 MG/DL (ref 0–0.3)
BILIRUB SERPL-MCNC: 0.3 MG/DL
BUN SERPL-MCNC: 17.2 MG/DL (ref 8–23)
CALCIUM SERPL-MCNC: 8.7 MG/DL (ref 8.8–10.4)
CHLORIDE SERPL-SCNC: 107 MMOL/L (ref 98–107)
CREAT SERPL-MCNC: 0.87 MG/DL (ref 0.67–1.17)
EGFRCR SERPLBLD CKD-EPI 2021: >90 ML/MIN/1.73M2
EOSINOPHIL # BLD AUTO: 0 10E3/UL (ref 0–0.7)
EOSINOPHIL NFR BLD AUTO: 1 %
ERYTHROCYTE [DISTWIDTH] IN BLOOD BY AUTOMATED COUNT: 16 % (ref 10–15)
GLUCOSE SERPL-MCNC: 175 MG/DL (ref 70–99)
HCO3 SERPL-SCNC: 26 MMOL/L (ref 22–29)
HCT VFR BLD AUTO: 24 % (ref 40–53)
HGB BLD-MCNC: 7.9 G/DL (ref 13.3–17.7)
IMM GRANULOCYTES # BLD: 0 10E3/UL
IMM GRANULOCYTES NFR BLD: 1 %
LYMPHOCYTES # BLD AUTO: 1 10E3/UL (ref 0.8–5.3)
LYMPHOCYTES NFR BLD AUTO: 48 %
MCH RBC QN AUTO: 28.5 PG (ref 26.5–33)
MCHC RBC AUTO-ENTMCNC: 32.9 G/DL (ref 31.5–36.5)
MCV RBC AUTO: 87 FL (ref 78–100)
MONOCYTES # BLD AUTO: 0.2 10E3/UL (ref 0–1.3)
MONOCYTES NFR BLD AUTO: 8 %
NEUTROPHILS # BLD AUTO: 0.9 10E3/UL (ref 1.6–8.3)
NEUTROPHILS NFR BLD AUTO: 41 %
NRBC # BLD AUTO: 0 10E3/UL
NRBC BLD AUTO-RTO: 0 /100
PLATELET # BLD AUTO: 175 10E3/UL (ref 150–450)
POTASSIUM SERPL-SCNC: 4.5 MMOL/L (ref 3.4–5.3)
PROT SERPL-MCNC: 7.1 G/DL (ref 6.4–8.3)
RBC # BLD AUTO: 2.77 10E6/UL (ref 4.4–5.9)
SODIUM SERPL-SCNC: 139 MMOL/L (ref 135–145)
URATE SERPL-MCNC: 4.1 MG/DL (ref 3.4–7)
WBC # BLD AUTO: 2.1 10E3/UL (ref 4–11)

## 2025-02-19 PROCEDURE — 258N000003 HC RX IP 258 OP 636: Performed by: STUDENT IN AN ORGANIZED HEALTH CARE EDUCATION/TRAINING PROGRAM

## 2025-02-19 PROCEDURE — 250N000013 HC RX MED GY IP 250 OP 250 PS 637: Performed by: STUDENT IN AN ORGANIZED HEALTH CARE EDUCATION/TRAINING PROGRAM

## 2025-02-19 PROCEDURE — 96415 CHEMO IV INFUSION ADDL HR: CPT

## 2025-02-19 PROCEDURE — 82565 ASSAY OF CREATININE: CPT

## 2025-02-19 PROCEDURE — 84550 ASSAY OF BLOOD/URIC ACID: CPT

## 2025-02-19 PROCEDURE — 80076 HEPATIC FUNCTION PANEL: CPT | Performed by: STUDENT IN AN ORGANIZED HEALTH CARE EDUCATION/TRAINING PROGRAM

## 2025-02-19 PROCEDURE — 36415 COLL VENOUS BLD VENIPUNCTURE: CPT | Performed by: STUDENT IN AN ORGANIZED HEALTH CARE EDUCATION/TRAINING PROGRAM

## 2025-02-19 PROCEDURE — 85025 COMPLETE CBC W/AUTO DIFF WBC: CPT | Performed by: STUDENT IN AN ORGANIZED HEALTH CARE EDUCATION/TRAINING PROGRAM

## 2025-02-19 PROCEDURE — 250N000011 HC RX IP 250 OP 636: Performed by: STUDENT IN AN ORGANIZED HEALTH CARE EDUCATION/TRAINING PROGRAM

## 2025-02-19 PROCEDURE — 96413 CHEMO IV INFUSION 1 HR: CPT

## 2025-02-19 PROCEDURE — 96375 TX/PRO/DX INJ NEW DRUG ADDON: CPT

## 2025-02-19 RX ORDER — HEPARIN SODIUM (PORCINE) LOCK FLUSH IV SOLN 100 UNIT/ML 100 UNIT/ML
5 SOLUTION INTRAVENOUS
Status: DISCONTINUED | OUTPATIENT
Start: 2025-02-19 | End: 2025-02-19 | Stop reason: HOSPADM

## 2025-02-19 RX ORDER — POLYETHYLENE GLYCOL 3350 17 G/17G
1 POWDER, FOR SOLUTION ORAL DAILY PRN
COMMUNITY

## 2025-02-19 RX ORDER — HEPARIN SODIUM (PORCINE) LOCK FLUSH IV SOLN 100 UNIT/ML 100 UNIT/ML
5 SOLUTION INTRAVENOUS ONCE
Status: COMPLETED | OUTPATIENT
Start: 2025-02-19 | End: 2025-02-19

## 2025-02-19 RX ORDER — DIPHENHYDRAMINE HCL 25 MG
50 CAPSULE ORAL ONCE
Status: COMPLETED | OUTPATIENT
Start: 2025-02-19 | End: 2025-02-19

## 2025-02-19 RX ORDER — TIMOLOL 5 MG/ML
1 SOLUTION/ DROPS OPHTHALMIC DAILY
COMMUNITY

## 2025-02-19 RX ORDER — HYDROCORTISONE SODIUM SUCCINATE 100 MG/2ML
100 INJECTION INTRAMUSCULAR; INTRAVENOUS ONCE
Status: COMPLETED | OUTPATIENT
Start: 2025-02-19 | End: 2025-02-19

## 2025-02-19 RX ADMIN — SODIUM CHLORIDE 250 ML: 9 INJECTION, SOLUTION INTRAVENOUS at 08:39

## 2025-02-19 RX ADMIN — DIPHENHYDRAMINE HYDROCHLORIDE 50 MG: 25 CAPSULE ORAL at 08:38

## 2025-02-19 RX ADMIN — HEPARIN 5 ML: 100 SYRINGE at 12:37

## 2025-02-19 RX ADMIN — SODIUM CHLORIDE 611 MG: 9 INJECTION, SOLUTION INTRAVENOUS at 09:39

## 2025-02-19 RX ADMIN — HYDROCORTISONE SODIUM SUCCINATE 100 MG: 100 INJECTION, POWDER, FOR SOLUTION INTRAMUSCULAR; INTRAVENOUS at 08:39

## 2025-02-19 RX ADMIN — HEPARIN 5 ML: 100 SYRINGE at 07:40

## 2025-02-19 ASSESSMENT — PAIN SCALES - GENERAL: PAINLEVEL_OUTOF10: NO PAIN (0)

## 2025-02-19 NOTE — NURSING NOTE
Chief Complaint   Patient presents with    Port Draw     Labs drawn via port access by lab RN       Port blood draw with heparin flush by lab RN. Vitals taken and appointment arrived. /80 message sent to infusion requesting recheck BP.    Neisha Emerson RN

## 2025-02-19 NOTE — PATIENT INSTRUCTIONS
[facility] Triage and after hours / weekends / holidays:  [phone]    Please call the triage or after hours line if you experience a temperature greater than or equal to 100.4, shaking chills, have uncontrolled nausea, vomiting and/or diarrhea, dizziness, shortness of breath, chest pain, bleeding, unexplained bruising, or if you have any other new/concerning symptoms, questions or concerns.      If you are having any concerning symptoms or wish to speak to a provider before your next infusion visit, please call triage to notify them so we can adequately serve you.     If you need a refill on a narcotic prescription or other medication, please call before your infusion appointment.           February 2025 Sunday Monday Tuesday Wednesday Thursday Friday Saturday                                 1       2     3     4     5     6     7    LAB PERIPHERAL  11:00 AM   (15 min.)   [facility] DRAW   [facility]    RETURN CCSL  11:15 AM   (30 min.)   Haroon Ellis MD   [facility] 8       9     10     11     12     13     14     15       16     17     18     19    LAB PERIPHERAL   7:30 AM   (15 min.)   [facility] DRAW   [facility]    ONC INFUSION 4 HR (240 MIN)   8:00 AM   (240 min.)    ONC INFUSION NURSE   [facility] 20     21     22       23     24     25     26     27     28                      March 2025 Sunday Monday Tuesday Wednesday Thursday Friday Saturday                                 1       2     3     4     5     6     7     8       9     10     11     12     13     14     15       16     17     18    MR CORTNEY   2:30 PM   (40 min.)   [id number]   [facility] 19    LAB PERIPHERAL  12:30 PM   (15 min.)   [facility] DRAW   [facility]    ONC INFUSION 4 HR (240 MIN)   1:00 PM   (240 min.)    ONC INFUSION NURSE   [facility]  [facility]    RETURN CCSL   1:15 PM   (30 min.)   Haroon Ellis MD   [facility] 20     21     22       23     24     25     26     27     28     29       30     31                                             Recent Results (from the past 24 hours)   Uric acid    Collection Time: 02/19/25  7:39 AM   Result Value Ref Range    Uric Acid 4.1 3.4 - 7.0 mg/dL   Comprehensive metabolic panel    Collection Time: 02/19/25  7:39 AM   Result Value Ref Range    Sodium 139 135 - 145 mmol/L    Potassium 4.5 3.4 - 5.3 mmol/L    Carbon Dioxide (CO2) 26 22 - 29 mmol/L    Anion Gap 6 (L) 7 - 15 mmol/L    Urea Nitrogen 17.2 8.0 - 23.0 mg/dL    Creatinine 0.87 0.67 - 1.17 mg/dL    GFR Estimate >90 >60 mL/min/1.73m2    Calcium 8.7 (L) 8.8 - 10.4 mg/dL    Chloride 107 98 - 107 mmol/L    Glucose 175 (H) 70 - 99 mg/dL    Alkaline Phosphatase 105 40 - 150 U/L    AST 41 0 - 45 U/L    ALT 91 (H) 0 - 70 U/L    Protein Total 7.1 6.4 - 8.3 g/dL    Albumin 3.8 3.5 - 5.2 g/dL    Bilirubin Total 0.3 <=1.2 mg/dL   CBC with platelets and differential    Collection Time: 02/19/25  7:39 AM   Result Value Ref Range    WBC Count 2.1 (L) 4.0 - 11.0 10e3/uL    RBC Count 2.77 (L) 4.40 - 5.90 10e6/uL    Hemoglobin 7.9 (L) 13.3 - 17.7 g/dL    Hematocrit 24.0 (L) 40.0 - 53.0 %    MCV 87 78 - 100 fL    MCH 28.5 26.5 - 33.0 pg    MCHC 32.9 31.5 - 36.5 g/dL    RDW 16.0 (H) 10.0 - 15.0 %    Platelet Count 175 150 - 450 10e3/uL    % Neutrophils 41 %    % Lymphocytes 48 %    % Monocytes 8 %    % Eosinophils 1 %    % Basophils 1 %    % Immature Granulocytes 1 %    NRBCs per 100 WBC 0 <1 /100    Absolute Neutrophils 0.9 (L) 1.6 - 8.3 10e3/uL    Absolute Lymphocytes 1.0 0.8 - 5.3 10e3/uL    Absolute Monocytes 0.2 0.0 - 1.3 10e3/uL    Absolute Eosinophils 0.0 0.0 - 0.7 10e3/uL    Absolute Basophils 0.0 0.0 - 0.2 10e3/uL    Absolute Immature Granulocytes 0.0 <=0.4 10e3/uL    Absolute NRBCs 0.0 10e3/uL   Bilirubin  direct    Collection Time: 02/19/25  7:39 AM   Result Value Ref Range    Bilirubin Direct 0.13 0.00 - 0.30 mg/dL

## 2025-02-19 NOTE — PROGRESS NOTES
Infusion Nursing Note:  Willie Charles presents today for cycle 1, day 1 imetelstat sodium.    Patient seen by provider today: No   present during visit today: Not Applicable.    Note: Patient new to infusion, oriented to infusion suite and call light.  Patient given written chemotherapy teaching materials on the new medication today. Basic side effects of medication reviewed. Patient encouraged to monitor for symptoms of bleeding or infection at home.  Patient confirms that he has a thermometer. Instructed to call with any fevers >100.4, shaking chills, other infectious symptoms, uncontrolled nausea, vomiting, diarrhea, constipation or any other new or concerning symptoms.  Provided with the phone number for triage.    Patient denies any fevers chills or other new concerns today. States he is fatigued but at baseline. Wishes to proceed with treatment.     2/19/2025 0825 Written Communication: Haroon Ellis MD/Naomi Ho RN  -This (ANC 0.9) is pretreatment value, Okay to proceed     Intravenous Access:  Implanted Port.    Treatment Conditions:  Lab Results   Component Value Date    HGB 7.9 (L) 02/19/2025    WBC 2.1 (L) 02/19/2025    ANEU 0.6 (L) 07/26/2024    ANEUTAUTO 0.9 (L) 02/19/2025     02/19/2025        Lab Results   Component Value Date     02/19/2025    POTASSIUM 4.5 02/19/2025    CR 0.87 02/19/2025    JACK 8.7 (L) 02/19/2025    BILITOTAL 0.3 02/19/2025    ALBUMIN 3.8 02/19/2025    ALT 91 (H) 02/19/2025    AST 41 02/19/2025       Results reviewed, labs did NOT meet treatment parameters: see order on treatment plan     Patient states he usually gets transfused when his Hgb is <8.  He has an appointment at his local infusion center on 2/21 to get a transfusion. Patient states that he is not feeling symptomatic with his low Hgb today and is ok waiting to get transfused on 2/21. Dr Ellis updated and in agreement with plan. Dr Ellis to put standing transfusion orders in for  future visits.        Post Infusion Assessment:  Patient tolerated infusion without incident.  Patient observed for 60 minutes post imetelstat sodium per protocol.  Blood return noted pre and post infusion.  Site patent and intact, free from redness, edema or discomfort.  No evidence of extravasations.  Access discontinued per protocol.       Discharge Plan:   Patient declined prescription refills.  Discharge instructions reviewed with: Patient and Family.  Patient and/or family verbalized understanding of discharge instructions and all questions answered.  AVS to patient via [employer].  Patient will return 3/19/25 for next appointment.   Patient discharged in stable condition accompanied by: self and wife.  Departure Mode: Ambulatory.      Naomi Ho RN  /

## 2025-03-18 ENCOUNTER — HOSPITAL ENCOUNTER (OUTPATIENT)
Dept: MRI IMAGING | Facility: CLINIC | Age: 65
Discharge: HOME | End: 2025-03-18
Attending: STUDENT IN AN ORGANIZED HEALTH CARE EDUCATION/TRAINING PROGRAM | Admitting: STUDENT IN AN ORGANIZED HEALTH CARE EDUCATION/TRAINING PROGRAM
Payer: COMMERCIAL

## 2025-03-18 DIAGNOSIS — D46.9 MDS (MYELODYSPLASTIC SYNDROME) (H): ICD-10-CM

## 2025-03-18 PROCEDURE — 74181 MRI ABDOMEN W/O CONTRAST: CPT | Mod: 26 | Performed by: RADIOLOGY

## 2025-03-18 PROCEDURE — 74181 MRI ABDOMEN W/O CONTRAST: CPT

## 2025-03-19 ENCOUNTER — INFUSION THERAPY VISIT (OUTPATIENT)
Dept: ONCOLOGY | Facility: CLINIC | Age: 65
End: 2025-03-19
Attending: STUDENT IN AN ORGANIZED HEALTH CARE EDUCATION/TRAINING PROGRAM
Payer: COMMERCIAL

## 2025-03-19 ENCOUNTER — TELEPHONE (OUTPATIENT)
Dept: ONCOLOGY | Facility: CLINIC | Age: 65
End: 2025-03-19

## 2025-03-19 ENCOUNTER — APPOINTMENT (OUTPATIENT)
Dept: LAB | Facility: CLINIC | Age: 65
End: 2025-03-19
Attending: STUDENT IN AN ORGANIZED HEALTH CARE EDUCATION/TRAINING PROGRAM
Payer: COMMERCIAL

## 2025-03-19 VITALS
RESPIRATION RATE: 18 BRPM | SYSTOLIC BLOOD PRESSURE: 142 MMHG | BODY MASS INDEX: 29.16 KG/M2 | DIASTOLIC BLOOD PRESSURE: 78 MMHG | HEART RATE: 78 BPM | WEIGHT: 194.6 LBS | TEMPERATURE: 98 F | OXYGEN SATURATION: 97 %

## 2025-03-19 DIAGNOSIS — D46.9 MDS (MYELODYSPLASTIC SYNDROME) (H): ICD-10-CM

## 2025-03-19 DIAGNOSIS — D46.9 MDS (MYELODYSPLASTIC SYNDROME) (H): Primary | ICD-10-CM

## 2025-03-19 DIAGNOSIS — D46.C MDS (MYELODYSPLASTIC SYNDROME) WITH 5Q DELETION (H): Primary | ICD-10-CM

## 2025-03-19 LAB
ALBUMIN SERPL BCG-MCNC: 4.1 G/DL (ref 3.5–5.2)
ALP SERPL-CCNC: 110 U/L (ref 40–150)
ALT SERPL W P-5'-P-CCNC: 82 U/L (ref 0–70)
ANION GAP SERPL CALCULATED.3IONS-SCNC: 6 MMOL/L (ref 7–15)
AST SERPL W P-5'-P-CCNC: 39 U/L (ref 0–45)
BASOPHILS # BLD AUTO: 0 10E3/UL (ref 0–0.2)
BASOPHILS NFR BLD AUTO: 0 %
BILIRUB DIRECT SERPL-MCNC: 0.13 MG/DL (ref 0–0.3)
BILIRUB SERPL-MCNC: 0.3 MG/DL
BUN SERPL-MCNC: 14.3 MG/DL (ref 8–23)
CALCIUM SERPL-MCNC: 9.2 MG/DL (ref 8.8–10.4)
CHLORIDE SERPL-SCNC: 106 MMOL/L (ref 98–107)
CREAT SERPL-MCNC: 0.9 MG/DL (ref 0.67–1.17)
EGFRCR SERPLBLD CKD-EPI 2021: >90 ML/MIN/1.73M2
EOSINOPHIL # BLD AUTO: 0 10E3/UL (ref 0–0.7)
EOSINOPHIL NFR BLD AUTO: 1 %
ERYTHROCYTE [DISTWIDTH] IN BLOOD BY AUTOMATED COUNT: 14.5 % (ref 10–15)
GLUCOSE SERPL-MCNC: 117 MG/DL (ref 70–99)
HCO3 SERPL-SCNC: 27 MMOL/L (ref 22–29)
HCT VFR BLD AUTO: 23 % (ref 40–53)
HGB BLD-MCNC: 8 G/DL (ref 13.3–17.7)
IMM GRANULOCYTES # BLD: 0 10E3/UL
IMM GRANULOCYTES NFR BLD: 2 %
LYMPHOCYTES # BLD AUTO: 1.1 10E3/UL (ref 0.8–5.3)
LYMPHOCYTES NFR BLD AUTO: 46 %
MCH RBC QN AUTO: 29.4 PG (ref 26.5–33)
MCHC RBC AUTO-ENTMCNC: 34.8 G/DL (ref 31.5–36.5)
MCV RBC AUTO: 85 FL (ref 78–100)
MONOCYTES # BLD AUTO: 0.2 10E3/UL (ref 0–1.3)
MONOCYTES NFR BLD AUTO: 9 %
NEUTROPHILS # BLD AUTO: 1.1 10E3/UL (ref 1.6–8.3)
NEUTROPHILS NFR BLD AUTO: 43 %
NRBC # BLD AUTO: 0 10E3/UL
NRBC BLD AUTO-RTO: 0 /100
PLATELET # BLD AUTO: 127 10E3/UL (ref 150–450)
POTASSIUM SERPL-SCNC: 4.6 MMOL/L (ref 3.4–5.3)
PROT SERPL-MCNC: 7.5 G/DL (ref 6.4–8.3)
RBC # BLD AUTO: 2.72 10E6/UL (ref 4.4–5.9)
SODIUM SERPL-SCNC: 139 MMOL/L (ref 135–145)
URATE SERPL-MCNC: 5 MG/DL (ref 3.4–7)
WBC # BLD AUTO: 2.5 10E3/UL (ref 4–11)

## 2025-03-19 PROCEDURE — 80053 COMPREHEN METABOLIC PANEL: CPT

## 2025-03-19 PROCEDURE — 250N000013 HC RX MED GY IP 250 OP 250 PS 637: Performed by: STUDENT IN AN ORGANIZED HEALTH CARE EDUCATION/TRAINING PROGRAM

## 2025-03-19 PROCEDURE — 85025 COMPLETE CBC W/AUTO DIFF WBC: CPT | Performed by: STUDENT IN AN ORGANIZED HEALTH CARE EDUCATION/TRAINING PROGRAM

## 2025-03-19 PROCEDURE — 258N000003 HC RX IP 258 OP 636: Performed by: STUDENT IN AN ORGANIZED HEALTH CARE EDUCATION/TRAINING PROGRAM

## 2025-03-19 PROCEDURE — 250N000011 HC RX IP 250 OP 636: Performed by: STUDENT IN AN ORGANIZED HEALTH CARE EDUCATION/TRAINING PROGRAM

## 2025-03-19 PROCEDURE — G0463 HOSPITAL OUTPT CLINIC VISIT: HCPCS | Performed by: STUDENT IN AN ORGANIZED HEALTH CARE EDUCATION/TRAINING PROGRAM

## 2025-03-19 PROCEDURE — 36591 DRAW BLOOD OFF VENOUS DEVICE: CPT | Performed by: STUDENT IN AN ORGANIZED HEALTH CARE EDUCATION/TRAINING PROGRAM

## 2025-03-19 PROCEDURE — 82248 BILIRUBIN DIRECT: CPT | Performed by: STUDENT IN AN ORGANIZED HEALTH CARE EDUCATION/TRAINING PROGRAM

## 2025-03-19 PROCEDURE — 84550 ASSAY OF BLOOD/URIC ACID: CPT

## 2025-03-19 RX ORDER — ALBUTEROL SULFATE 0.83 MG/ML
2.5 SOLUTION RESPIRATORY (INHALATION)
OUTPATIENT
Start: 2025-03-19

## 2025-03-19 RX ORDER — HEPARIN SODIUM (PORCINE) LOCK FLUSH IV SOLN 100 UNIT/ML 100 UNIT/ML
5 SOLUTION INTRAVENOUS ONCE
Status: COMPLETED | OUTPATIENT
Start: 2025-03-19 | End: 2025-03-19

## 2025-03-19 RX ORDER — HYDROCORTISONE SODIUM SUCCINATE 100 MG/2ML
100 INJECTION INTRAMUSCULAR; INTRAVENOUS ONCE
Status: COMPLETED | OUTPATIENT
Start: 2025-03-19 | End: 2025-03-19

## 2025-03-19 RX ORDER — DIPHENHYDRAMINE HYDROCHLORIDE 50 MG/ML
25 INJECTION, SOLUTION INTRAMUSCULAR; INTRAVENOUS
Start: 2025-03-19

## 2025-03-19 RX ORDER — DEFERIPRONE 1000 MG/1
2000 TABLET ORAL 3 TIMES DAILY
Qty: 180 TABLET | Refills: 0 | Status: SHIPPED | OUTPATIENT
Start: 2025-03-19

## 2025-03-19 RX ORDER — HEPARIN SODIUM,PORCINE 10 UNIT/ML
5-20 VIAL (ML) INTRAVENOUS DAILY PRN
OUTPATIENT
Start: 2025-03-19

## 2025-03-19 RX ORDER — METHYLPREDNISOLONE SODIUM SUCCINATE 40 MG/ML
40 INJECTION INTRAMUSCULAR; INTRAVENOUS
Start: 2025-03-19

## 2025-03-19 RX ORDER — MEPERIDINE HYDROCHLORIDE 25 MG/ML
25 INJECTION INTRAMUSCULAR; INTRAVENOUS; SUBCUTANEOUS
OUTPATIENT
Start: 2025-03-19

## 2025-03-19 RX ORDER — DIPHENHYDRAMINE HYDROCHLORIDE 50 MG/ML
50 INJECTION, SOLUTION INTRAMUSCULAR; INTRAVENOUS ONCE
Start: 2025-03-19

## 2025-03-19 RX ORDER — HYDROCORTISONE SODIUM SUCCINATE 100 MG/2ML
100 INJECTION INTRAMUSCULAR; INTRAVENOUS ONCE
Status: CANCELLED | OUTPATIENT
Start: 2025-03-19 | End: 2025-04-09

## 2025-03-19 RX ORDER — LORAZEPAM 2 MG/ML
0.5 INJECTION INTRAMUSCULAR EVERY 4 HOURS PRN
OUTPATIENT
Start: 2025-03-19

## 2025-03-19 RX ORDER — DIPHENHYDRAMINE HCL 25 MG
50 CAPSULE ORAL ONCE
Status: COMPLETED | OUTPATIENT
Start: 2025-03-19 | End: 2025-03-19

## 2025-03-19 RX ORDER — ALBUTEROL SULFATE 90 UG/1
1-2 INHALANT RESPIRATORY (INHALATION)
Start: 2025-03-19

## 2025-03-19 RX ORDER — EPINEPHRINE 1 MG/ML
0.3 INJECTION, SOLUTION INTRAMUSCULAR; SUBCUTANEOUS EVERY 5 MIN PRN
OUTPATIENT
Start: 2025-03-19

## 2025-03-19 RX ORDER — HEPARIN SODIUM (PORCINE) LOCK FLUSH IV SOLN 100 UNIT/ML 100 UNIT/ML
5 SOLUTION INTRAVENOUS
OUTPATIENT
Start: 2025-03-19

## 2025-03-19 RX ORDER — DIPHENHYDRAMINE HCL 25 MG
50 CAPSULE ORAL ONCE
Status: CANCELLED
Start: 2025-03-19

## 2025-03-19 RX ORDER — DIPHENHYDRAMINE HYDROCHLORIDE 50 MG/ML
50 INJECTION, SOLUTION INTRAMUSCULAR; INTRAVENOUS
Start: 2025-03-19

## 2025-03-19 RX ADMIN — SODIUM CHLORIDE 250 ML: 9 INJECTION, SOLUTION INTRAVENOUS at 14:41

## 2025-03-19 RX ADMIN — IMETELSTAT SODIUM 611 MG: 188 INJECTION, POWDER, LYOPHILIZED, FOR SOLUTION INTRAVENOUS at 15:37

## 2025-03-19 RX ADMIN — HEPARIN 3 ML: 100 SYRINGE at 12:52

## 2025-03-19 RX ADMIN — DIPHENHYDRAMINE HYDROCHLORIDE 50 MG: 25 CAPSULE ORAL at 14:37

## 2025-03-19 RX ADMIN — HYDROCORTISONE SODIUM SUCCINATE 100 MG: 100 INJECTION, POWDER, FOR SOLUTION INTRAMUSCULAR; INTRAVENOUS at 14:37

## 2025-03-19 ASSESSMENT — PAIN SCALES - GENERAL: PAINLEVEL_OUTOF10: NO PAIN (0)

## 2025-03-19 NOTE — PROGRESS NOTES
[facility]  HEMATOLOGY & ONCOLOGY  FOLLOW UP VISIT    PATIENT NAME: Willie Charles          MRN # [id number]  YOB: 1960  DATE OF VISIT:  Mar 19, 2025            REFERRING PROVIDER:   Mr. Willie Charles was seen at the request of Arely for further evaluation and/or management.     History of Presenting Illness  Mr. Willie Charles is a pleasant 63 year old male with a past medical history significant for hyperthyroisidism s/p radioactive iodine, HTN  and MDS dx in 7/2023 after he had fatigue for several months found to have bicytopenia with WBC 3.3 and hemoglobin 7.0 and platelets 308 that led to a bone marrow biopsy on 7/19/2023 that showed hypocellular marrow with 10 to 20% cellularity and 2% blasts with megakaryocytic hyperplasia with dysplasia.  Cytogenetics showed 5 q. deletion and NGS showed BCORL1 with a VAF of 3%.  He was started on lenalidomide 5 mg daily on 8/11/2023.  His anemia worsened after a few days and he was then started on concurrent azacitidine in addition to Revlimid on 8/28/2023.  As per records his counts trended down by 9/2023 and his Revlimid was discontinued.  He was continued on 2 more cycles of azacitidine until October 2023.  He did see Dr. Squires at [place] in 11/2023 and was recommended to restart Revlimid starting at 2.5 mg dosing every other day for 3 weeks on and 1 week off.  He was started on 2.5 mg 2 times a week  and has been tolerating that for the last 3 months.He has continued to require about 2-4 prbcs in a month.  Denies any fevers or chills or ongoing bleeding from anywhere.    He has now established with the [facility] and has now been increased to lenalidomide 5mg daily for 3 weeks on 1 week off.     Subjective  Patient is here with wife and son. He completed another cycle of lenalidomide 10mg daily for 3 weeks on 2/5/2025. This was his 6th cycle of full dose lenalidomide. Tolerated this well, no rashes or brain fog as before. This cycle  he still got only 3 units of transfusion.  Denies any bleeding from anywhere including BRBPR/Melena.  Further molecular studies from his bone marrow biopsy is also back at this point of time.    Past Medical History  No past medical history on file.    Family History  No family history on file.    Social History  Smoking: Never  Alcohol use: occasionally drinks alcohol  Status:   Children/grandchildren: Did not ask.   Occupation: Housing  , .     [employer]  Current Outpatient Medications  Current Outpatient Medications   Medication Sig Dispense Refill    acyclovir (ZOVIRAX) 400 MG tablet Take 400 mg by mouth 2 times daily      aspirin 81 MG EC tablet Take 81 mg by mouth daily      buPROPion (WELLBUTRIN SR) 150 MG 12 hr tablet Take 150 mg by mouth daily.      cyanocobalamin (VITAMIN B-12) 1000 MCG tablet Take 1,000 mcg by mouth daily      diltiazem ER COATED BEADS (CARDIZEM CD/CARTIA XT) 180 MG 24 hr capsule Take 180 mg by mouth daily      folic acid (FOLVITE) 1 MG tablet Take 1 tablet (1 mg) by mouth daily. 30 tablet 5    levothyroxine (SYNTHROID/LEVOTHROID) 125 MCG tablet Take 125 mcg by mouth daily.      loratadine (CLARITIN) 10 MG tablet Take 10 mg by mouth daily      metFORMIN (GLUCOPHAGE XR) 500 MG 24 hr tablet Take 500 mg by mouth daily (with dinner)      metoprolol succinate ER (TOPROL XL) 50 MG 24 hr tablet Take 100 mg by mouth daily.      montelukast (SINGULAIR) 10 MG tablet Take 10 mg by mouth at bedtime      polyethylene glycol (MIRALAX) 17 g packet Take 1 packet by mouth daily as needed for constipation.      REVLIMID 10 MG CAPS capsule Take 10 mg by mouth daily (Patient not taking: Reported on 2/19/2025)      sildenafil (REVATIO) 20 MG tablet Take 1 tab on empty stomach one hour prior to sexual activity; may increase to a max of 5 tabs      tamsulosin (FLOMAX) 0.4 MG capsule Take 0.4 mg by mouth.      timolol, PF, (TIMOPTIC OCUDOSE) 0.5 % ophthalmic solution Place 1 drop  "into both eyes daily.         Allergies  Allergies   Allergen Reactions    Benazepril Other (See Comments)     Critical    Penicillin V Rash       Review of systems  A complete ROS was performed and was negative except as mentioned in HPI    Physical Exam  Vitals:    25 1236   BP: (!) 142/78   BP Location: Right arm   Patient Position: Sitting   Cuff Size: Adult Regular   Pulse: 78   Resp: 18   Temp: 98  F (36.7  C)   TempSrc: Oral   SpO2: 97%   Weight: 88.3 kg (194 lb 9.6 oz)       Wt Readings from Last 3 Encounters:   25 88.3 kg (194 lb 9.6 oz)   25 88.8 kg (195 lb 11.2 oz)   25 91.2 kg (201 lb)     ECO   male sitting in chair in NAD  Video visit limited exam.   Sitting comfortably and breathing with no acceszo    Labs and Imaging    Sodium   Date Value Ref Range Status   2025 139 135 - 145 mmol/L Final    ,   Potassium   Date Value Ref Range Status   2025 4.5 3.4 - 5.3 mmol/L Final      Creatinine   Date Value Ref Range Status   2025 0.87 0.67 - 1.17 mg/dL Final       No results found for: \"LDH\"      Uric Acid   Date Value Ref Range Status   2025 4.1 3.4 - 7.0 mg/dL Final       WBC Count   Date Value Ref Range Status   2025 2.5 (L) 4.0 - 11.0 10e3/uL Final   ,   Hemoglobin   Date Value Ref Range Status   2025 8.0 (L) 13.3 - 17.7 g/dL Final   ],   Platelet Count   Date Value Ref Range Status   2025 127 (L) 150 - 450 10e3/uL Final   ,    MCV   Date Value Ref Range Status   2025 85 78 - 100 fL Final         EPO level was 1213 on 2023.        Bone marrow biopsy 2023  FINAL DIAGNOSIS   Peripheral blood, bone marrow aspirate, biopsy and clot sections ([id number]; 2023):     1. Myelodysplastic syndrome with isolated del(5q).     2. Two small monotypic B-cell populations (3% and 11% of total events) of uncertain significance, reportedly   detected by flow cytometric analysis. See comment.     COMMENT   The significance " of the small monotypic B-cell populations identified by flow cytometric analysis is uncertain, as   diagnostic features of lymphoma are not seen by morphology or demonstrated by immunohistochemistry. These findings may   represent monoclonal B-cell lymphocytosis, although minimal bone marrow involvement by B-cell lymphoma cannot be   excluded. Clinical and radiographic correlation is recommended.  A lymph node or other extramedullary tissue   biopsy is suggested if lymphoma is clinically and/or radiographically suspected.   Cytogenetics 5q deletion.   NGS BCORL1 VAF 3%    Bone marrow 2/21/2024  Peripheral blood, bone marrow aspirate, touch imprint, core biopsy, and clot:     -  Pancytopenia.     -  Inadequate bone marrow aspirate, core biopsy and clot sections.      -  The flow cytometry ([id number]) of the bone marrow shows approximately 3% monotypic B-cells positive for CD5, CD19, CD20, CD23,  and lambda, approximately 14% monotypic B-cells positive for CD5 (variable), CD19, CD20 (dim to negative), CD23 (dim), CD38 (parital) and , with no definitive light chain expression, and no increased blast population identified.   The marrow aspirate is hemodilute. The clot sections show no marrow tissue. The biopsy core shows a tiny area (<5% core sections) with a cellular (~10%) bone marrow showing tri-lineage cells including clusters of small hypolobated megakaryocytes. The immunohistochemistry on the biopsy sections appears to show increased megakaryocytes and no definitive diagnostic features of lymphoma involvement. The presence of increased small hypolobated megakaryocytes suggests persistent disease of patient's known MDS with 5q deletion. Chromosome studies show that although 20 metaphases are routinely examined, only 1 was identified and appeared normal.   The significance of the two populations of monotypic B-cells identified by flow cytometry is uncertain, as diagnostic features of lymphoma are not  seen by morphology or immunohistochemistry in this inadequate marrow biopsy specimen. These findings may represent monoclonal B-cell lymphocytosis of undetermined significance or involvement of the peripheral blood by a B-cell lymphoma, although bone marrow involvement by B-cell lymphoma cannot be excluded.     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   The first clone is characterized by 46 chromosomes and a deletion of the long arm of one copy of chromosome 5. The second clone contains the same deletion of chromosome 5q with the addition of a translocation between the short arm of chromosome 1 and the long arm of chromosome 11, and a deletion within the long arm of one copy of chromosome 7.  Taken together these findings are consistent with the reported morphologic and immunophenotpic finding of recurrent/persistent myelodysplastic syndrome ([id number]) and indicate cytogenetic progression.   The following patient's previously characterized BCORL1 pathogenic mutation was not identified in the current bone marrow aspirate.  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     Assessment and Plan  Mr. Willie Charles is a 64 year old male who is here for further evaluation and/or management of MDS.    Oncology:  MDS with 5q deletion  WHO Classification: MDS w low blasts and 5q deletion  Date of diagnosis: 7/19/2023  Revised IPSS score: 2.5 Low  Molecular IPSS: -0.42 Moderate low  Bone Marrow Blast %: 2  Cytogenetics: 5q del  Molecular: BCORL1 VAF 3%  Past Treatment: lenalidomide 5mg on 8/11/2023 to 9/2023 with addition of  azacitidine since 8/28/2023 until 10/2023.   Current Treatment: Restarted lenalidomide 2.5mg twice a week for 3 weeks on 1 week off since 11/2023. Off since 3/12. Last cycle lenalidomide 10mg starting 8/28. Next cycle lenalidomide 10mg on 9/30. Frank 10mg on 11/4. Frank 10 on 12/30 and Frank 10mg on 1/14    Had an admission 4/18 from 4/22 and stopped lenalidomide on 4/17. Completed 3 weeks on and 1 week of of lenalidomide and restarted on 6/1 for a new cycle.     I discussed the pathophysiology and natural history of MDS with Mr. Willie Charles today. We went over the current prognostic models and scoring systems that are used in clinical practice as well as the potential for disease evolution into acute myeloid leukemia. We went over the current FDA approved treatments for myelodysplastic syndromes and covered that the only curative option is through an allogeneic hematopoietic cell transplantation. His disease is  Moderate low  risk and currently BMT is not recommended.     We discussed that MDS with 5 q. responds well to lenalidomide and that he is not received adequate amount of treatment with lenalidomide.  Given that he is tolerating lenalidomide at this very low minimal dose I recommended that he increase the lenalidomide to 2.5 mg 5 times a week for 3 weeks on and then 1 week off.  If he continues to tolerate this well we can continue increasing the dose to eventually reach 10 mg 3 weeks on and 1 week off.  He can possibly be started on other agents if his anemia is not responding to lenalidomide including agents like Luspatercept.    He had a recent admission for neutropenic fever s/p antibiotics without blood cultures showing anything.  He did have diffuse ST elevations concerning for stefan/pericarditis.  Course was complicated by A-fib with RVR.  Not sure if the troponin leak was secondary to the RVR.  Though there are rare cases of arrhythmias with lenalidomide doubt if this was related to it.    He has now  tolerated a full cycle of lenalidomide 2.5mg daily for 3 weeks and 1 week off.  We increased his lenalidomide to 5mg he has tolerated for 2 cycles with minimal rash that disappeared on its own without any treatment.     He tolerated the lenalidomide 10 mg for 3 weeks on and 1 week off starting 8/28. He has now finished another cycle that started on 9/30 and another cycle from 11/4   Last ANC was 700 on 11/29 and he has not had any infectious complications. We will delay his next cycle to 12/9. Bone marrow around 1/9. He marrow shows improvement in cellularity to normal levels and less dysplasia, however not directly compared as his previous slides were not read by our pathologists. His cytogenetic studies have now finalized and they show a second clone with complex karyotype containing 5 q. deletion along with 1p, 11 q. and 7 q. Deletion.  This is highly concerning and consistent with the progression.    The complex karyotype worries me.  However the primary symptom that he is continuing to have is transfusion dependence and as we had previously discussed we will start him on imetelstat.  We will give it at least 3 to 4 months, if no response or any other signs of progression we will then switch to venetoclax and azacitidine as per [facility] trial.  I will also send a referral to one of the BMT physicians to get him established with bone marrow transplant.    Will start imetelstat here ASAP.  After couple cycles we can then consider possibly looking at the facility in [place] being able to give I imetelstat there.    If ANC continues to be below 0.5 for more than 7 days and he continues to have recurrent infections then we can consider prophylactic antibiotics.     #Elevated ferritin 1676------> 5372  - STOPPED Deferasirox.   - Given further elevation of ferritin will obtain a quantification of liver iron with MRI-STIR  sequencing ( ferriscan) of liver   - Once this is done can consider deferoxamine after a through  discussion with patient that there is no overall survival benefit and possible liver and kidney toxicity.   - I have send a repeat ferritin again.     Plan:  - RTC with me on 3/12 with cycle 2 planned same day.   - Start Imetelstat infusions ASAP  - Will need MRI liver for iron quantification.   - c/w twice weekly labs and as needed transfusions at local oncologist.  - If ANC trends below 500 consistently for more than 1 week can start levofloxacin as prophylaxis.     Hematology:  Anemia/Thrombocytopenia:   Transfuse leukocyte reduced and irradiated blood products: 1 unit pRBC if hgb is 7.0-7.5, 2 units pRBCs if Hgb 6.0-6.9 g/dl, 1 unit platelets if PLT count is <20,000 or <50,000 with clinical bleeding.    Immunocompromised:  As a result of his disease and/or previous treatment. Mr. Willie Charles is immunocompromised. his last ANC is >500 and there is no need for prophylactic antibiotics at this time.     Cardiac:  Mr. Willie Charles has no history of heart disease.     Renal:  Creatinine results reviewed today. Mr. Willie Charles does not have any renal disease.    Hepatic:  Liver function tests reviewed today.    Psychosocial:  Mr. Willie Charles is coping well with his diagnosis.     All other questions and concerns were addressed and answered to Mr. Willie Charles's satisfaction.    Today, I spent a total of 40 minutes of face-to-face and non face-to-face time with Mr. Willie Charles. Time spent included review and discussion of diagnostic test results, patient counseling, and coordination of care.    The longitudinal plan of care for the diagnosis(es)/condition(s) as documented were addressed during this visit. Due to the added complexity in care, I will continue to support Willie in the subsequent management and with ongoing continuity of care.    Haroon Ellis MD    Division of Hematology, Oncology and Transplantation  [facility]  P: [phone]   face-to-face and non face-to-face time with . Willie Charles. Time spent included review and discussion of diagnostic test results, patient counseling, and coordination of care.    The longitudinal plan of care for the diagnosis(es)/condition(s) as documented were addressed during this visit. Due to the added complexity in care, I will continue to support Willie in the subsequent management and with ongoing continuity of care.    Haroon Ellis MD    Division of Hematology, Oncology and Transplantation  [facility]  P: [phone]

## 2025-03-19 NOTE — NURSING NOTE
"Chief Complaint   Patient presents with    Port Draw     Labs drawn via port by RN. VS taken.     Port accessed with 20 gauge, 3/4\" power needle by RN, labs collected, line flushed with saline and heparin.  Vitals taken. Pt checked in for appointment(s).     Shima Jaime RN    "

## 2025-03-19 NOTE — NURSING NOTE
"Oncology Rooming Note    March 19, 2025 1:43 PM   Willie Charles is a 64 year old male who presents for:    Chief Complaint   Patient presents with    Port Draw     Labs drawn via port by RN. VS taken.    Oncology Clinic Visit     Myelodysplastic syndrome     Initial Vitals: BP (!) 142/78 (BP Location: Right arm, Patient Position: Sitting, Cuff Size: Adult Regular)   Pulse 78   Temp 98  F (36.7  C) (Oral)   Resp 18   Wt 88.3 kg (194 lb 9.6 oz)   SpO2 97%   BMI 29.16 kg/m   Estimated body mass index is 29.16 kg/m  as calculated from the following:    Height as of 2/19/25: 1.74 m (5' 8.5\").    Weight as of this encounter: 88.3 kg (194 lb 9.6 oz). Body surface area is 2.07 meters squared.  No Pain (0) Comment: Data Unavailable   No LMP for male patient.  Allergies reviewed: Yes  Medications reviewed: Yes    Medications: Medication refills not needed today.  Pharmacy name entered into M2TE[employer]: [facility] - [place], [place] - [address]     Frailty Screening:   Is the patient here for a new oncology consult visit in cancer care? 2. No    PHQ9:  Did this patient require a PHQ9?: No      Clinical concerns: Patient has been having bleeding from the gums. He states his teeth have been feeling \"funny\" for the past few days. He believes that they are getting loose.      Caitlin Hebert              "

## 2025-03-19 NOTE — PATIENT INSTRUCTIONS
[facility] Triage and after hours / weekends / holidays:  [phone]    Please call the triage or after hours line if you experience a temperature greater than or equal to 100.4, shaking chills, have uncontrolled nausea, vomiting and/or diarrhea, dizziness, shortness of breath, chest pain, bleeding, unexplained bruising, or if you have any other new/concerning symptoms, questions or concerns.      If you are having any concerning symptoms or wish to speak to a provider before your next infusion visit, please call triage to notify them so we can adequately serve you.     If you need a refill on a narcotic prescription or other medication, please call before your infusion appointment.                March 2025 Sunday Monday Tuesday Wednesday Thursday Friday Saturday                                 1       2     3     4     5     6     7     8       9     10     11     12     13     14     15       16     17     18    MR BARR   2:30 PM   (40 min.)   UUMR1   [facility] 19    LAB PERIPHERAL  12:30 PM   (15 min.)   [facility] LAB DRAW   [facility]    ONC INFUSION 4 HR (240 MIN)   1:00 PM   (240 min.)    ONC INFUSION NURSE   [facility]    RETURN CCSL   1:15 PM   (30 min.)   Haroon Ellis MD   [facility] 20     21     22       23     24     25     26     27     28     29       30     31                                          April 2025 Sunday Monday Tuesday Wednesday Thursday Friday Saturday             1     2     3     4  Happy Birthday!     5       6     7     8     9     10     11     12       13     14     15     16     17     18     19       20     21     22     23     24     25     26       27     28     29     30                                   Recent Results (from the past 24 hours)   Comprehensive metabolic panel    Collection Time: 03/19/25 12:49 PM   Result Value Ref Range     Sodium 139 135 - 145 mmol/L    Potassium 4.6 3.4 - 5.3 mmol/L    Carbon Dioxide (CO2) 27 22 - 29 mmol/L    Anion Gap 6 (L) 7 - 15 mmol/L    Urea Nitrogen 14.3 8.0 - 23.0 mg/dL    Creatinine 0.90 0.67 - 1.17 mg/dL    GFR Estimate >90 >60 mL/min/1.73m2    Calcium 9.2 8.8 - 10.4 mg/dL    Chloride 106 98 - 107 mmol/L    Glucose 117 (H) 70 - 99 mg/dL    Alkaline Phosphatase 110 40 - 150 U/L    AST 39 0 - 45 U/L    ALT 82 (H) 0 - 70 U/L    Protein Total 7.5 6.4 - 8.3 g/dL    Albumin 4.1 3.5 - 5.2 g/dL    Bilirubin Total 0.3 <=1.2 mg/dL   Uric acid    Collection Time: 03/19/25 12:49 PM   Result Value Ref Range    Uric Acid 5.0 3.4 - 7.0 mg/dL   CBC with platelets and differential    Collection Time: 03/19/25 12:49 PM   Result Value Ref Range    WBC Count 2.5 (L) 4.0 - 11.0 10e3/uL    RBC Count 2.72 (L) 4.40 - 5.90 10e6/uL    Hemoglobin 8.0 (L) 13.3 - 17.7 g/dL    Hematocrit 23.0 (L) 40.0 - 53.0 %    MCV 85 78 - 100 fL    MCH 29.4 26.5 - 33.0 pg    MCHC 34.8 31.5 - 36.5 g/dL    RDW 14.5 10.0 - 15.0 %    Platelet Count 127 (L) 150 - 450 10e3/uL    % Neutrophils 43 %    % Lymphocytes 46 %    % Monocytes 9 %    % Eosinophils 1 %    % Basophils 0 %    % Immature Granulocytes 2 %    NRBCs per 100 WBC 0 <1 /100    Absolute Neutrophils 1.1 (L) 1.6 - 8.3 10e3/uL    Absolute Lymphocytes 1.1 0.8 - 5.3 10e3/uL    Absolute Monocytes 0.2 0.0 - 1.3 10e3/uL    Absolute Eosinophils 0.0 0.0 - 0.7 10e3/uL    Absolute Basophils 0.0 0.0 - 0.2 10e3/uL    Absolute Immature Granulocytes 0.0 <=0.4 10e3/uL    Absolute NRBCs 0.0 10e3/uL   Bilirubin direct    Collection Time: 03/19/25 12:49 PM   Result Value Ref Range    Bilirubin Direct 0.13 0.00 - 0.30 mg/dL

## 2025-03-19 NOTE — PROGRESS NOTES
Infusion Nursing Note:  Willie Charles presents today for C2D1 Rytelo.    Patient seen by provider today: Yes: Dr. Ellis   present during visit today: Not Applicable.    Note: Jimi presents to infusion today following his clinic visit. He denies any new concerns and wishes to proceed today.    Intravenous Access:  Implanted Port.    Treatment Conditions:  Lab Results   Component Value Date    HGB 8.0 (L) 03/19/2025    WBC 2.5 (L) 03/19/2025    ANEU 0.6 (L) 07/26/2024    ANEUTAUTO 1.1 (L) 03/19/2025     (L) 03/19/2025     Lab Results   Component Value Date     03/19/2025    POTASSIUM 4.6 03/19/2025    CR 0.90 03/19/2025    JACK 9.2 03/19/2025    BILITOTAL 0.3 03/19/2025    ALBUMIN 4.1 03/19/2025    ALT 82 (H) 03/19/2025    AST 39 03/19/2025     Results reviewed, labs MET treatment parameters, ok to proceed with treatment.    Post Infusion Assessment:  Patient tolerated infusion without incident.  Blood return noted pre and post infusion.  Site patent and intact, free from redness, edema or discomfort.  No evidence of extravasations.  Access discontinued per protocol.     Discharge Plan:   Patient declined prescription refills.  Discharge instructions reviewed with: Patient and Family.  Patient and/or family verbalized understanding of discharge instructions and all questions answered.  AVS to patient via [employer].  Patient will return 4/16 for next appointment.   Patient discharged in stable condition accompanied by: wife.  Departure Mode: Ambulatory.      Anali Sykes RN

## 2025-03-19 NOTE — TELEPHONE ENCOUNTER
PA Initiation    Medication: DEFERIPRONE 1000 MG PO TABS  Insurance Company: [employer] - Phone [phone] Fax [phone]  Pharmacy Filling the Rx:    Filling Pharmacy Phone:    Filling Pharmacy Fax:    Start Date: 3/19/2025

## 2025-03-19 NOTE — Clinical Note
3/19/2025      Willie Charles  [address]  [place] [address]      Dear Colleague,    Thank you for referring your patient, Willie Charles, to the [facility]. Please see a copy of my visit note below.    [facility]  HEMATOLOGY & ONCOLOGY  FOLLOW UP VISIT    PATIENT NAME: Willie Charles          MRN # [id number]  YOB: 1960  DATE OF VISIT:  Mar 19, 2025            REFERRING PROVIDER:   Mr. Willie Charles was seen at the request of Arely for further evaluation and/or management.     History of Presenting Illness  Mr. Willie Charles is a pleasant 63 year old male with a past medical history significant for hyperthyroisidism s/p radioactive iodine, HTN  and MDS dx in 7/2023 after he had fatigue for several months found to have bicytopenia with WBC 3.3 and hemoglobin 7.0 and platelets 308 that led to a bone marrow biopsy on 7/19/2023 that showed hypocellular marrow with 10 to 20% cellularity and 2% blasts with megakaryocytic hyperplasia with dysplasia.  Cytogenetics showed 5 q. deletion and NGS showed BCORL1 with a VAF of 3%.  He was started on lenalidomide 5 mg daily on 8/11/2023.  His anemia worsened after a few days and he was then started on concurrent azacitidine in addition to Revlimid on 8/28/2023.  As per records his counts trended down by 9/2023 and his Revlimid was discontinued.  He was continued on 2 more cycles of azacitidine until October 2023.  He did see Dr. Squires at [facility] in 11/2023 and was recommended to restart Revlimid starting at 2.5 mg dosing every other day for 3 weeks on and 1 week off.  He was started on 2.5 mg 2 times a week  and has been tolerating that for the last 3 months.He has continued to require about 2-4 prbcs in a month.  Denies any fevers or chills or ongoing bleeding from anywhere.    He has now established with the [facility] clinic and has now been increased to lenalidomide 5mg daily for 3 weeks on 1 week off.      Subjective  Patient is here with wife and son. He completed another cycle of lenalidomide 10mg daily for 3 weeks on 2/5/2025. This was his 6th cycle of full dose lenalidomide. Tolerated this well, no rashes or brain fog as before. This cycle he still got only 3 units of transfusion.  Denies any bleeding from anywhere including BRBPR/Melena.  Further molecular studies from his bone marrow biopsy is also back at this point of time.    Past Medical History  No past medical history on file.    Family History  No family history on file.    Social History  Smoking: Never  Alcohol use: occasionally drinks alcohol  Status:   Children/grandchildren: Did not ask.   Occupation: Housing  , .     [employer]  Current Outpatient Medications  Current Outpatient Medications   Medication Sig Dispense Refill    acyclovir (ZOVIRAX) 400 MG tablet Take 400 mg by mouth 2 times daily      aspirin 81 MG EC tablet Take 81 mg by mouth daily      buPROPion (WELLBUTRIN SR) 150 MG 12 hr tablet Take 150 mg by mouth daily.      cyanocobalamin (VITAMIN B-12) 1000 MCG tablet Take 1,000 mcg by mouth daily      diltiazem ER COATED BEADS (CARDIZEM CD/CARTIA XT) 180 MG 24 hr capsule Take 180 mg by mouth daily      folic acid (FOLVITE) 1 MG tablet Take 1 tablet (1 mg) by mouth daily. 30 tablet 5    levothyroxine (SYNTHROID/LEVOTHROID) 125 MCG tablet Take 125 mcg by mouth daily.      loratadine (CLARITIN) 10 MG tablet Take 10 mg by mouth daily      metFORMIN (GLUCOPHAGE XR) 500 MG 24 hr tablet Take 500 mg by mouth daily (with dinner)      metoprolol succinate ER (TOPROL XL) 50 MG 24 hr tablet Take 100 mg by mouth daily.      montelukast (SINGULAIR) 10 MG tablet Take 10 mg by mouth at bedtime      polyethylene glycol (MIRALAX) 17 g packet Take 1 packet by mouth daily as needed for constipation.      REVLIMID 10 MG CAPS capsule Take 10 mg by mouth daily (Patient not taking: Reported on 2/19/2025)      sildenafil (REVATIO) 20  "MG tablet Take 1 tab on empty stomach one hour prior to sexual activity; may increase to a max of 5 tabs      tamsulosin (FLOMAX) 0.4 MG capsule Take 0.4 mg by mouth.      timolol, PF, (TIMOPTIC OCUDOSE) 0.5 % ophthalmic solution Place 1 drop into both eyes daily.         Allergies  Allergies   Allergen Reactions    Benazepril Other (See Comments)     Critical    Penicillin V Rash       Review of systems  A complete ROS was performed and was negative except as mentioned in HPI    Physical Exam  Vitals:    25 1236   BP: (!) 142/78   BP Location: Right arm   Patient Position: Sitting   Cuff Size: Adult Regular   Pulse: 78   Resp: 18   Temp: 98  F (36.7  C)   TempSrc: Oral   SpO2: 97%   Weight: 88.3 kg (194 lb 9.6 oz)       Wt Readings from Last 3 Encounters:   25 88.3 kg (194 lb 9.6 oz)   25 88.8 kg (195 lb 11.2 oz)   25 91.2 kg (201 lb)     ECO   male sitting in chair in NAD  Video visit limited exam.   Sitting comfortably and breathing with no acceszo    Labs and Imaging    Sodium   Date Value Ref Range Status   2025 139 135 - 145 mmol/L Final    ,   Potassium   Date Value Ref Range Status   2025 4.5 3.4 - 5.3 mmol/L Final      Creatinine   Date Value Ref Range Status   2025 0.87 0.67 - 1.17 mg/dL Final       No results found for: \"LDH\"      Uric Acid   Date Value Ref Range Status   2025 4.1 3.4 - 7.0 mg/dL Final       WBC Count   Date Value Ref Range Status   2025 2.5 (L) 4.0 - 11.0 10e3/uL Final   ,   Hemoglobin   Date Value Ref Range Status   2025 8.0 (L) 13.3 - 17.7 g/dL Final   ],   Platelet Count   Date Value Ref Range Status   2025 127 (L) 150 - 450 10e3/uL Final   ,    MCV   Date Value Ref Range Status   2025 85 78 - 100 fL Final         EPO level was 1213 on 2023.        Bone marrow biopsy 2023  FINAL DIAGNOSIS   Peripheral blood, bone marrow aspirate, biopsy and clot sections ([id number]; 2023):     1. " Myelodysplastic syndrome with isolated del(5q).     2. Two small monotypic B-cell populations (3% and 11% of total events) of uncertain significance, reportedly   detected by flow cytometric analysis. See comment.     COMMENT   The significance of the small monotypic B-cell populations identified by flow cytometric analysis is uncertain, as   diagnostic features of lymphoma are not seen by morphology or demonstrated by immunohistochemistry. These findings may   represent monoclonal B-cell lymphocytosis, although minimal bone marrow involvement by B-cell lymphoma cannot be   excluded. Clinical and radiographic correlation is recommended.  A lymph node or other extramedullary tissue   biopsy is suggested if lymphoma is clinically and/or radiographically suspected.   Cytogenetics 5q deletion.   NGS BCORL1 VAF 3%    Bone marrow 2/21/2024  Peripheral blood, bone marrow aspirate, touch imprint, core biopsy, and clot:     -  Pancytopenia.     -  Inadequate bone marrow aspirate, core biopsy and clot sections.      -  The flow cytometry ([id number]) of the bone marrow shows approximately 3% monotypic B-cells positive for CD5, CD19, CD20, CD23,  and lambda, approximately 14% monotypic B-cells positive for CD5 (variable), CD19, CD20 (dim to negative), CD23 (dim), CD38 (parital) and , with no definitive light chain expression, and no increased blast population identified.   The marrow aspirate is hemodilute. The clot sections show no marrow tissue. The biopsy core shows a tiny area (<5% core sections) with a cellular (~10%) bone marrow showing tri-lineage cells including clusters of small hypolobated megakaryocytes. The immunohistochemistry on the biopsy sections appears to show increased megakaryocytes and no definitive diagnostic features of lymphoma involvement. The presence of increased small hypolobated megakaryocytes suggests persistent disease of patient's known MDS with 5q deletion. Chromosome studies show  that although 20 metaphases are routinely examined, only 1 was identified and appeared normal.   The significance of the two populations of monotypic B-cells identified by flow cytometry is uncertain, as diagnostic features of lymphoma are not seen by morphology or immunohistochemistry in this inadequate marrow biopsy specimen. These findings may represent monoclonal B-cell lymphocytosis of undetermined significance or involvement of the peripheral blood by a B-cell lymphoma, although bone marrow involvement by B-cell lymphoma cannot be excluded.     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   The first clone is characterized by 46 chromosomes and a deletion of the long arm of one copy of chromosome 5. The second clone contains the same deletion of chromosome 5q with the addition of a translocation between the short arm of chromosome 1 and the long arm of chromosome 11, and a deletion within the long arm of one copy of chromosome 7.  Taken together these findings are consistent with the reported morphologic and immunophenotpic finding of recurrent/persistent myelodysplastic syndrome ([id number]) and indicate cytogenetic progression.   The following patient's previously characterized BCORL1 pathogenic mutation was not identified in the current bone marrow aspirate.  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     Assessment and Plan  Mr. Willie Charles is a 64 year old male who is here for further evaluation and/or management of MDS.    Oncology:  MDS with 5q deletion  WHO Classification: MDS w low blasts and 5q  deletion  Date of diagnosis: 7/19/2023  Revised IPSS score: 2.5 Low  Molecular IPSS: -0.42 Moderate low  Bone Marrow Blast %: 2  Cytogenetics: 5q del  Molecular: BCORL1 VAF 3%  Past Treatment: lenalidomide 5mg on 8/11/2023 to 9/2023 with addition of azacitidine since 8/28/2023 until 10/2023.   Current Treatment: Restarted lenalidomide 2.5mg twice a week for 3 weeks on 1 week off since 11/2023. Off since 3/12. Last cycle lenalidomide 10mg starting 8/28. Next cycle lenalidomide 10mg on 9/30. Frank 10mg on 11/4. Frank 10 on 12/30 and Frank 10mg on 1/14    Had an admission 4/18 from 4/22 and stopped lenalidomide on 4/17. Completed 3 weeks on and 1 week of of lenalidomide and restarted on 6/1 for a new cycle.     I discussed the pathophysiology and natural history of MDS with . Willie NAKITA Charles today. We went over the current prognostic models and scoring systems that are used in clinical practice as well as the potential for disease evolution into acute myeloid leukemia. We went over the current FDA approved treatments for myelodysplastic syndromes and covered that the only curative option is through an allogeneic hematopoietic cell transplantation. His disease is  Moderate low  risk and currently BMT is not recommended.     We discussed that MDS with 5 q. responds well to lenalidomide and that he is not received adequate amount of treatment with lenalidomide.  Given that he is tolerating lenalidomide at this very low minimal dose I recommended that he increase the lenalidomide to 2.5 mg 5 times a week for 3 weeks on and then 1 week off.  If he continues to tolerate this well we can continue increasing the dose to eventually reach 10 mg 3 weeks on and 1 week off.  He can possibly be started on other agents if his anemia is not responding to lenalidomide including agents like Luspatercept.    He had a recent admission for neutropenic fever s/p antibiotics without blood cultures showing anything.  He did have diffuse ST  elevations concerning for stefan/pericarditis.  Course was complicated by A-fib with RVR.  Not sure if the troponin leak was secondary to the RVR.  Though there are rare cases of arrhythmias with lenalidomide doubt if this was related to it.    He has now tolerated a full cycle of lenalidomide 2.5mg daily for 3 weeks and 1 week off.  We increased his lenalidomide to 5mg he has tolerated for 2 cycles with minimal rash that disappeared on its own without any treatment.     He tolerated the lenalidomide 10 mg for 3 weeks on and 1 week off starting 8/28. He has now finished another cycle that started on 9/30 and another cycle from 11/4   Last ANC was 700 on 11/29 and he has not had any infectious complications. We will delay his next cycle to 12/9. Bone marrow around 1/9. He marrow shows improvement in cellularity to normal levels and less dysplasia, however not directly compared as his previous slides were not read by our pathologists. His cytogenetic studies have now finalized and they show a second clone with complex karyotype containing 5 q. deletion along with 1p, 11 q. and 7 q. Deletion.  This is highly concerning and consistent with the progression.    The complex karyotype worries me.  However the primary symptom that he is continuing to have is transfusion dependence and as we had previously discussed we will start him on imetelstat.  We will give it at least 3 to 4 months, if no response or any other signs of progression we will then switch to venetoclax and azacitidine as per Castor trial.  I will also send a referral to one of the BMT physicians to get him established with bone marrow transplant.    Will start imetelstat here ASAP.  After couple cycles we can then consider possibly looking at the facility in [place] being able to give I imetelstat there.    If ANC continues to be below 0.5 for more than 7 days and he continues to have recurrent infections then we can consider prophylactic antibiotics.      #Elevated ferritin 1676------> 5372  - STOPPED Deferasirox.   - Given further elevation of ferritin will obtain a quantification of liver iron with MRI-STIR  sequencing ( ferriscan) of liver   - Once this is done can consider deferoxamine after a through discussion with patient that there is no overall survival benefit and possible liver and kidney toxicity.   - I have send a repeat ferritin again.     Plan:  - RTC with me on 3/12 with cycle 2 planned same day.   - Start Imetelstat infusions ASAP  - Will need MRI liver for iron quantification.   - c/w twice weekly labs and as needed transfusions at local oncologist.  - If ANC trends below 500 consistently for more than 1 week can start levofloxacin as prophylaxis.     Hematology:  Anemia/Thrombocytopenia:   Transfuse leukocyte reduced and irradiated blood products: 1 unit pRBC if hgb is 7.0-7.5, 2 units pRBCs if Hgb 6.0-6.9 g/dl, 1 unit platelets if PLT count is <20,000 or <50,000 with clinical bleeding.    Immunocompromised:  As a result of his disease and/or previous treatment. Mr. Willie Charles is immunocompromised. his last ANC is >500 and there is no need for prophylactic antibiotics at this time.     Cardiac:  Mr. Willie Charles has no history of heart disease.     Renal:  Creatinine results reviewed today. Mr. Willie Charles does not have any renal disease.    Hepatic:  Liver function tests reviewed today.    Psychosocial:  Mr. Willie Charles is coping well with his diagnosis.     All other questions and concerns were addressed and answered to Mr. Willie Charles's satisfaction.    Today, I spent a total of 40 minutes of face-to-face and non face-to-face time with Mr. Willie Charles. Time spent included review and discussion of diagnostic test results, patient counseling, and coordination of care.    The longitudinal plan of care for the diagnosis(es)/condition(s) as documented were addressed during this visit. Due to the added  complexity in care, I will continue to support Willie in the subsequent management and with ongoing continuity of care.    Haroon Ellis MD    Division of Hematology, Oncology and Transplantation  [facility]  P: [phone]      Again, thank you for allowing me to participate in the care of your patient.        Sincerely,        Haroon Ellis MD    Electronically signed

## 2025-03-20 NOTE — TELEPHONE ENCOUNTER
Prior Authorization Approval    Medication: DEFERIPRONE 1000 MG PO TABS  Authorization Effective Date: 1/1/2025  Authorization Expiration Date: 3/19/2026  Approved Dose/Quantity: 180/30 ds  Reference #: Key: [id number]   Insurance Company: [employer] - Phone [phone] Fax [phone]  Expected CoPay: $ 0  CoPay Card Available:      Financial Assistance Needed: no  Which Pharmacy is filling the prescription:    Pharmacy Notified: no  Patient Notified: yes

## 2025-04-15 DIAGNOSIS — D46.9 MDS (MYELODYSPLASTIC SYNDROME) (H): ICD-10-CM

## 2025-04-16 ENCOUNTER — APPOINTMENT (OUTPATIENT)
Dept: LAB | Facility: CLINIC | Age: 65
End: 2025-04-16
Payer: COMMERCIAL

## 2025-04-16 ENCOUNTER — INFUSION THERAPY VISIT (OUTPATIENT)
Dept: ONCOLOGY | Facility: CLINIC | Age: 65
End: 2025-04-16
Attending: STUDENT IN AN ORGANIZED HEALTH CARE EDUCATION/TRAINING PROGRAM
Payer: COMMERCIAL

## 2025-04-16 VITALS
OXYGEN SATURATION: 98 % | WEIGHT: 197.7 LBS | HEART RATE: 74 BPM | TEMPERATURE: 98 F | RESPIRATION RATE: 19 BRPM | BODY MASS INDEX: 29.62 KG/M2 | DIASTOLIC BLOOD PRESSURE: 72 MMHG | SYSTOLIC BLOOD PRESSURE: 133 MMHG

## 2025-04-16 DIAGNOSIS — D46.C MDS (MYELODYSPLASTIC SYNDROME) WITH 5Q DELETION (H): Primary | ICD-10-CM

## 2025-04-16 LAB
ALBUMIN SERPL BCG-MCNC: 3.8 G/DL (ref 3.5–5.2)
ALP SERPL-CCNC: 90 U/L (ref 40–150)
ALT SERPL W P-5'-P-CCNC: 56 U/L (ref 0–70)
AST SERPL W P-5'-P-CCNC: 29 U/L (ref 0–45)
BASOPHILS # BLD AUTO: 0 10E3/UL (ref 0–0.2)
BASOPHILS NFR BLD AUTO: 1 %
BILIRUB DIRECT SERPL-MCNC: 0.1 MG/DL (ref 0–0.3)
BILIRUB SERPL-MCNC: 0.2 MG/DL
EOSINOPHIL # BLD AUTO: 0 10E3/UL (ref 0–0.7)
EOSINOPHIL NFR BLD AUTO: 1 %
ERYTHROCYTE [DISTWIDTH] IN BLOOD BY AUTOMATED COUNT: 13.3 % (ref 10–15)
FERRITIN SERPL-MCNC: 5464 NG/ML (ref 31–409)
HCT VFR BLD AUTO: 21.4 % (ref 40–53)
HGB BLD-MCNC: 7.4 G/DL (ref 13.3–17.7)
IMM GRANULOCYTES # BLD: 0 10E3/UL
IMM GRANULOCYTES NFR BLD: 1 %
LYMPHOCYTES # BLD AUTO: 1 10E3/UL (ref 0.8–5.3)
LYMPHOCYTES NFR BLD AUTO: 45 %
MCH RBC QN AUTO: 27.9 PG (ref 26.5–33)
MCHC RBC AUTO-ENTMCNC: 34.6 G/DL (ref 31.5–36.5)
MCV RBC AUTO: 81 FL (ref 78–100)
MONOCYTES # BLD AUTO: 0.2 10E3/UL (ref 0–1.3)
MONOCYTES NFR BLD AUTO: 10 %
NEUTROPHILS # BLD AUTO: 0.9 10E3/UL (ref 1.6–8.3)
NEUTROPHILS NFR BLD AUTO: 43 %
NRBC # BLD AUTO: 0 10E3/UL
NRBC BLD AUTO-RTO: 0 /100
PLAT MORPH BLD: NORMAL
PLATELET # BLD AUTO: 111 10E3/UL (ref 150–450)
PROT SERPL-MCNC: 6.8 G/DL (ref 6.4–8.3)
RBC # BLD AUTO: 2.65 10E6/UL (ref 4.4–5.9)
RBC MORPH BLD: NORMAL
WBC # BLD AUTO: 2.2 10E3/UL (ref 4–11)

## 2025-04-16 PROCEDURE — G0463 HOSPITAL OUTPT CLINIC VISIT: HCPCS | Performed by: STUDENT IN AN ORGANIZED HEALTH CARE EDUCATION/TRAINING PROGRAM

## 2025-04-16 PROCEDURE — 250N000011 HC RX IP 250 OP 636: Performed by: STUDENT IN AN ORGANIZED HEALTH CARE EDUCATION/TRAINING PROGRAM

## 2025-04-16 PROCEDURE — 85004 AUTOMATED DIFF WBC COUNT: CPT | Performed by: STUDENT IN AN ORGANIZED HEALTH CARE EDUCATION/TRAINING PROGRAM

## 2025-04-16 PROCEDURE — 258N000003 HC RX IP 258 OP 636: Performed by: STUDENT IN AN ORGANIZED HEALTH CARE EDUCATION/TRAINING PROGRAM

## 2025-04-16 PROCEDURE — 80076 HEPATIC FUNCTION PANEL: CPT | Performed by: STUDENT IN AN ORGANIZED HEALTH CARE EDUCATION/TRAINING PROGRAM

## 2025-04-16 PROCEDURE — 250N000013 HC RX MED GY IP 250 OP 250 PS 637: Performed by: STUDENT IN AN ORGANIZED HEALTH CARE EDUCATION/TRAINING PROGRAM

## 2025-04-16 PROCEDURE — 82728 ASSAY OF FERRITIN: CPT

## 2025-04-16 PROCEDURE — 36591 DRAW BLOOD OFF VENOUS DEVICE: CPT | Performed by: STUDENT IN AN ORGANIZED HEALTH CARE EDUCATION/TRAINING PROGRAM

## 2025-04-16 RX ORDER — HYDROCORTISONE SODIUM SUCCINATE 100 MG/2ML
100 INJECTION INTRAMUSCULAR; INTRAVENOUS ONCE
Status: CANCELLED | OUTPATIENT
Start: 2025-04-16 | End: 2025-04-16

## 2025-04-16 RX ORDER — DIPHENHYDRAMINE HYDROCHLORIDE 50 MG/ML
25 INJECTION, SOLUTION INTRAMUSCULAR; INTRAVENOUS
Status: CANCELLED
Start: 2025-04-16

## 2025-04-16 RX ORDER — DIPHENHYDRAMINE HYDROCHLORIDE 50 MG/ML
50 INJECTION, SOLUTION INTRAMUSCULAR; INTRAVENOUS
Status: CANCELLED
Start: 2025-04-16

## 2025-04-16 RX ORDER — HEPARIN SODIUM (PORCINE) LOCK FLUSH IV SOLN 100 UNIT/ML 100 UNIT/ML
5 SOLUTION INTRAVENOUS ONCE
Status: COMPLETED | OUTPATIENT
Start: 2025-04-16 | End: 2025-04-16

## 2025-04-16 RX ORDER — MEPERIDINE HYDROCHLORIDE 25 MG/ML
25 INJECTION INTRAMUSCULAR; INTRAVENOUS; SUBCUTANEOUS
Status: CANCELLED | OUTPATIENT
Start: 2025-04-16

## 2025-04-16 RX ORDER — ALBUTEROL SULFATE 90 UG/1
1-2 INHALANT RESPIRATORY (INHALATION)
Status: CANCELLED
Start: 2025-04-16

## 2025-04-16 RX ORDER — DEFERIPRONE 1000 MG/1
2000 TABLET ORAL 3 TIMES DAILY
Qty: 180 TABLET | Refills: 0 | Status: SHIPPED | OUTPATIENT
Start: 2025-04-16

## 2025-04-16 RX ORDER — DIPHENHYDRAMINE HCL 25 MG
50 CAPSULE ORAL ONCE
Status: CANCELLED
Start: 2025-04-16

## 2025-04-16 RX ORDER — METHYLPREDNISOLONE SODIUM SUCCINATE 40 MG/ML
40 INJECTION INTRAMUSCULAR; INTRAVENOUS
Status: CANCELLED
Start: 2025-04-16

## 2025-04-16 RX ORDER — HEPARIN SODIUM (PORCINE) LOCK FLUSH IV SOLN 100 UNIT/ML 100 UNIT/ML
5 SOLUTION INTRAVENOUS
Status: DISCONTINUED | OUTPATIENT
Start: 2025-04-16 | End: 2025-04-16 | Stop reason: HOSPADM

## 2025-04-16 RX ORDER — LORAZEPAM 2 MG/ML
0.5 INJECTION INTRAMUSCULAR EVERY 4 HOURS PRN
Status: CANCELLED | OUTPATIENT
Start: 2025-04-16

## 2025-04-16 RX ORDER — HEPARIN SODIUM (PORCINE) LOCK FLUSH IV SOLN 100 UNIT/ML 100 UNIT/ML
5 SOLUTION INTRAVENOUS
Status: CANCELLED | OUTPATIENT
Start: 2025-04-16

## 2025-04-16 RX ORDER — HYDROCORTISONE SODIUM SUCCINATE 100 MG/2ML
100 INJECTION INTRAMUSCULAR; INTRAVENOUS ONCE
Status: COMPLETED | OUTPATIENT
Start: 2025-04-16 | End: 2025-04-16

## 2025-04-16 RX ORDER — ALBUTEROL SULFATE 0.83 MG/ML
2.5 SOLUTION RESPIRATORY (INHALATION)
Status: CANCELLED | OUTPATIENT
Start: 2025-04-16

## 2025-04-16 RX ORDER — EPINEPHRINE 1 MG/ML
0.3 INJECTION, SOLUTION INTRAMUSCULAR; SUBCUTANEOUS EVERY 5 MIN PRN
Status: CANCELLED | OUTPATIENT
Start: 2025-04-16

## 2025-04-16 RX ORDER — HEPARIN SODIUM,PORCINE 10 UNIT/ML
5-20 VIAL (ML) INTRAVENOUS DAILY PRN
Status: CANCELLED | OUTPATIENT
Start: 2025-04-16

## 2025-04-16 RX ORDER — DIPHENHYDRAMINE HYDROCHLORIDE 50 MG/ML
50 INJECTION, SOLUTION INTRAMUSCULAR; INTRAVENOUS ONCE
Status: CANCELLED
Start: 2025-04-16

## 2025-04-16 RX ORDER — DIPHENHYDRAMINE HCL 25 MG
50 CAPSULE ORAL ONCE
Status: COMPLETED | OUTPATIENT
Start: 2025-04-16 | End: 2025-04-16

## 2025-04-16 RX ADMIN — Medication 5 ML: at 10:11

## 2025-04-16 RX ADMIN — HYDROCORTISONE SODIUM SUCCINATE 100 MG: 100 INJECTION, POWDER, FOR SOLUTION INTRAMUSCULAR; INTRAVENOUS at 11:51

## 2025-04-16 RX ADMIN — HEPARIN 5 ML: 100 SYRINGE at 15:06

## 2025-04-16 RX ADMIN — IMETELSTAT SODIUM 611 MG: 47 INJECTION, POWDER, LYOPHILIZED, FOR SOLUTION INTRAVENOUS at 13:08

## 2025-04-16 RX ADMIN — DIPHENHYDRAMINE HYDROCHLORIDE 50 MG: 25 CAPSULE ORAL at 11:51

## 2025-04-16 ASSESSMENT — PAIN SCALES - GENERAL: PAINLEVEL_OUTOF10: NO PAIN (0)

## 2025-04-16 NOTE — PROGRESS NOTES
[facility]  HEMATOLOGY & ONCOLOGY  FOLLOW UP VISIT    PATIENT NAME: Willie Charles          MRN # [id number]  YOB: 1960  DATE OF VISIT:  Apr 16, 2025            REFERRING PROVIDER:   Mr. Willie Charles was seen at the request of Arely for further evaluation and/or management.     History of Presenting Illness  Mr. Willie Charles is a pleasant 63 year old male with a past medical history significant for hyperthyroisidism s/p radioactive iodine, HTN  and MDS dx in 7/2023 after he had fatigue for several months found to have bicytopenia with WBC 3.3 and hemoglobin 7.0 and platelets 308 that led to a bone marrow biopsy on 7/19/2023 that showed hypocellular marrow with 10 to 20% cellularity and 2% blasts with megakaryocytic hyperplasia with dysplasia.  Cytogenetics showed 5 q. deletion and NGS showed BCORL1 with a VAF of 3%.  He was started on lenalidomide 5 mg daily on 8/11/2023.  His anemia worsened after a few days and he was then started on concurrent azacitidine in addition to Revlimid on 8/28/2023.  As per records his counts trended down by 9/2023 and his Revlimid was discontinued.  He was continued on 2 more cycles of azacitidine until October 2023.  He did see Dr. Squires at [facility] in 11/2023 and was recommended to restart Revlimid starting at 2.5 mg dosing every other day for 3 weeks on and 1 week off.  He was started on 2.5 mg 2 times a week  and has been tolerating that for the last 3 months.He has continued to require about 2-4 prbcs in a month.  Denies any fevers or chills or ongoing bleeding from anywhere.    He has now established with the [facility] and has now been increased to lenalidomide 5mg daily for 3 weeks on 1 week off.     Subjective  Patient is here with wife. He has been started on imetelstat and has tolerating this well. He has been taking the deferipirone. With his new hgb threshold of 7.5 he has been having more symptoms. Denies any new symptoms/new side  effects. This cycle he got 4 units of transfusion.  Denies any bleeding from anywhere including BRBPR/Melena.     Past Medical History  No past medical history on file.    Family History  No family history on file.    Social History  Smoking: Never  Alcohol use: occasionally drinks alcohol  Status:   Children/grandchildren: Did not ask.   Occupation: Housing  , .     [employer]  Current Outpatient Medications  Current Outpatient Medications   Medication Sig Dispense Refill    acyclovir (ZOVIRAX) 400 MG tablet Take 400 mg by mouth 2 times daily      aspirin 81 MG EC tablet Take 81 mg by mouth daily      buPROPion (WELLBUTRIN SR) 150 MG 12 hr tablet Take 150 mg by mouth daily.      cyanocobalamin (VITAMIN B-12) 1000 MCG tablet Take 1,000 mcg by mouth daily      deferiprone (FERRIPROX) 1000 MG TABS tablet Take 2 tablets (2,000 mg) by mouth 3 times daily. 180 tablet 0    diltiazem ER COATED BEADS (CARDIZEM CD/CARTIA XT) 180 MG 24 hr capsule Take 180 mg by mouth daily      folic acid (FOLVITE) 1 MG tablet Take 1 tablet (1 mg) by mouth daily. 30 tablet 5    levothyroxine (SYNTHROID/LEVOTHROID) 125 MCG tablet Take 125 mcg by mouth daily.      loratadine (CLARITIN) 10 MG tablet Take 10 mg by mouth daily      metFORMIN (GLUCOPHAGE XR) 500 MG 24 hr tablet Take 500 mg by mouth daily (with dinner)      metoprolol succinate ER (TOPROL XL) 50 MG 24 hr tablet Take 100 mg by mouth daily.      montelukast (SINGULAIR) 10 MG tablet Take 10 mg by mouth at bedtime      polyethylene glycol (MIRALAX) 17 g packet Take 1 packet by mouth daily as needed for constipation.      sildenafil (REVATIO) 20 MG tablet Take 1 tab on empty stomach one hour prior to sexual activity; may increase to a max of 5 tabs      tamsulosin (FLOMAX) 0.4 MG capsule Take 0.4 mg by mouth.      timolol, PF, (TIMOPTIC OCUDOSE) 0.5 % ophthalmic solution Place 1 drop into both eyes daily.      REVLIMID 10 MG CAPS capsule Take 10 mg by mouth  "daily (Patient not taking: Reported on 2025)         Allergies  Allergies   Allergen Reactions    Benazepril Other (See Comments)     Critical    Penicillin V Rash       Review of systems  A complete ROS was performed and was negative except as mentioned in HPI    Physical Exam  Vitals:    25 1004   BP: 133/72   Pulse: 74   Resp: 19   Temp: 98  F (36.7  C)   TempSrc: Oral   SpO2: 98%   Weight: 89.7 kg (197 lb 11.2 oz)       Wt Readings from Last 3 Encounters:   25 89.7 kg (197 lb 11.2 oz)   25 88.3 kg (194 lb 9.6 oz)   25 88.8 kg (195 lb 11.2 oz)     ECO   male sitting in chair in NAD  HEET: NC/AT, EOMI w/ PERRL, anicteric sclera. MMM. No mouth sores.   Neck: supple no JVP  CV: normal S1,S2 with RRR no m/r/g  Resp: good air movement b/l. No wheezes or crackles  Abd: soft, NTND, no organomegaly or masses. BS normoactive.   Ext: WWP no edema or cyanosis  Neuro: A&Ox4, no lateralizing sx. Grossly nonfocal. Strength appears preserved.   Lymph: No cervical, supraclavicular, axillary or inguinal LAD  Skin: no concerning lesions or rashes or petechiae      Labs and Imaging    Sodium   Date Value Ref Range Status   2025 139 135 - 145 mmol/L Final    ,   Potassium   Date Value Ref Range Status   2025 4.6 3.4 - 5.3 mmol/L Final      Creatinine   Date Value Ref Range Status   2025 0.90 0.67 - 1.17 mg/dL Final       No results found for: \"LDH\"      Uric Acid   Date Value Ref Range Status   2025 5.0 3.4 - 7.0 mg/dL Final       WBC Count   Date Value Ref Range Status   2025 2.5 (L) 4.0 - 11.0 10e3/uL Final   ,   Hemoglobin   Date Value Ref Range Status   2025 8.0 (L) 13.3 - 17.7 g/dL Final   ],   Platelet Count   Date Value Ref Range Status   2025 127 (L) 150 - 450 10e3/uL Final   ,    MCV   Date Value Ref Range Status   2025 85 78 - 100 fL Final         EPO level was 1213 on 2023.        Bone marrow biopsy 2023  FINAL " DIAGNOSIS   Peripheral blood, bone marrow aspirate, biopsy and clot sections ([id number]; 07/19/2023):     1. Myelodysplastic syndrome with isolated del(5q).     2. Two small monotypic B-cell populations (3% and 11% of total events) of uncertain significance, reportedly   detected by flow cytometric analysis. See comment.     COMMENT   The significance of the small monotypic B-cell populations identified by flow cytometric analysis is uncertain, as   diagnostic features of lymphoma are not seen by morphology or demonstrated by immunohistochemistry. These findings may   represent monoclonal B-cell lymphocytosis, although minimal bone marrow involvement by B-cell lymphoma cannot be   excluded. Clinical and radiographic correlation is recommended.  A lymph node or other extramedullary tissue   biopsy is suggested if lymphoma is clinically and/or radiographically suspected.   Cytogenetics 5q deletion.   NGS BCORL1 VAF 3%    Bone marrow 2/21/2024  Peripheral blood, bone marrow aspirate, touch imprint, core biopsy, and clot:     -  Pancytopenia.     -  Inadequate bone marrow aspirate, core biopsy and clot sections.      -  The flow cytometry ([id number]) of the bone marrow shows approximately 3% monotypic B-cells positive for CD5, CD19, CD20, CD23,  and lambda, approximately 14% monotypic B-cells positive for CD5 (variable), CD19, CD20 (dim to negative), CD23 (dim), CD38 (parital) and , with no definitive light chain expression, and no increased blast population identified.   The marrow aspirate is hemodilute. The clot sections show no marrow tissue. The biopsy core shows a tiny area (<5% core sections) with a cellular (~10%) bone marrow showing tri-lineage cells including clusters of small hypolobated megakaryocytes. The immunohistochemistry on the biopsy sections appears to show increased megakaryocytes and no definitive diagnostic features of lymphoma involvement. The presence of increased small hypolobated  megakaryocytes suggests persistent disease of patient's known MDS with 5q deletion. Chromosome studies show that although 20 metaphases are routinely examined, only 1 was identified and appeared normal.   The significance of the two populations of monotypic B-cells identified by flow cytometry is uncertain, as diagnostic features of lymphoma are not seen by morphology or immunohistochemistry in this inadequate marrow biopsy specimen. These findings may represent monoclonal B-cell lymphocytosis of undetermined significance or involvement of the peripheral blood by a B-cell lymphoma, although bone marrow involvement by B-cell lymphoma cannot be excluded.     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   The first clone is characterized by 46 chromosomes and a deletion of the long arm of one copy of chromosome 5. The second clone contains the same deletion of chromosome 5q with the addition of a translocation between the short arm of chromosome 1 and the long arm of chromosome 11, and a deletion within the long arm of one copy of chromosome 7.  Taken together these findings are consistent with the reported morphologic and immunophenotpic finding of recurrent/persistent myelodysplastic syndrome ([id number]) and indicate cytogenetic progression.   The following patient's previously characterized BCORL1 pathogenic mutation was not identified in the current bone marrow aspirate.  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       MRI liver ferriscan 3/18/2025  FINDINGS:  Average liver iron concentration is 34.4 mg/g dry tissue (normal  =  0.17 - 1.8)  Average liver iron concentration is 616 mmol/kg dry tissue (normal = 3  - 33)    Assessment and Plan  Mr. Willie Charles is a 64 year old male who is here for further evaluation and/or management of MDS.    Oncology:  MDS with 5q deletion  WHO Classification: MDS w low blasts and 5q deletion  Date of diagnosis: 7/19/2023  Revised IPSS score: 2.5 Low  Molecular IPSS: -0.42 Moderate low---> mODERATE HIGH  Bone Marrow Blast %: 2  Cytogenetics: 5q del---> 5Q + del 7q + t (1:11) Complex  Molecular: BCORL1 VAF 3%----> None detected except VUS in DNMT3A  Past Treatment: lenalidomide 5mg on 8/11/2023 to 9/2023 with addition of azacitidine since 8/28/2023 until 10/2023.   Current Treatment: Restarted lenalidomide 2.5mg twice a week for 3 weeks on 1 week off since 11/2023. Off since 3/12. Last cycle lenalidomide 10mg starting 8/28. Next cycle lenalidomide 10mg on 9/30. Frank 10mg on 11/4. Frank 10 on 12/30 and Frank 10mg on 1/14    Had an admission 4/18 from 4/22 and stopped lenalidomide on 4/17. Completed 3 weeks on and 1 week of of lenalidomide and restarted on 6/1 for a new cycle.     I discussed the pathophysiology and natural history of MDS with Mr. Willie Charles today. We went over the current prognostic models and scoring systems that are used in clinical practice as well as the potential for disease evolution into acute myeloid leukemia. We went over the current FDA approved treatments for myelodysplastic syndromes and covered that the only curative option is through an allogeneic hematopoietic cell transplantation. His disease is  Moderate low  risk and currently BMT is not recommended.     We discussed that MDS with 5 q. responds well to lenalidomide and that he is not received adequate amount of treatment with lenalidomide.  Given that he is tolerating lenalidomide at this very low minimal dose I recommended that he increase the lenalidomide to 2.5 mg 5 times a week for 3 weeks on and then 1 week  off.  If he continues to tolerate this well we can continue increasing the dose to eventually reach 10 mg 3 weeks on and 1 week off.  He can possibly be started on other agents if his anemia is not responding to lenalidomide including agents like Luspatercept.    He had a recent admission for neutropenic fever s/p antibiotics without blood cultures showing anything.  He did have diffuse ST elevations concerning for stefan/pericarditis.  Course was complicated by A-fib with RVR.  Not sure if the troponin leak was secondary to the RVR.  Though there are rare cases of arrhythmias with lenalidomide doubt if this was related to it.    He has now tolerated a full cycle of lenalidomide 2.5mg daily for 3 weeks and 1 week off.  We increased his lenalidomide to 5mg he has tolerated for 2 cycles with minimal rash that disappeared on its own without any treatment.     He tolerated the lenalidomide 10 mg for 3 weeks on and 1 week off starting 8/28. He has now finished another cycle that started on 9/30 and another cycle from 11/4   Last ANC was 700 on 11/29 and he has not had any infectious complications. We will delay his next cycle to 12/9. Bone marrow around 1/9. He marrow shows improvement in cellularity to normal levels and less dysplasia, however not directly compared as his previous slides were not read by our pathologists. His cytogenetic studies have now finalized and they show a second clone with complex karyotype containing 5 q. deletion along with 1p, 11 q. and 7 q. Deletion.  This is highly concerning and consistent with the progression.    The complex karyotype worries me.  However the primary symptom that he is continuing to have is transfusion dependence and as we had previously discussed we will start him on imetelstat.  We will give it at least 3 to 4 months, if no response or any other signs of progression we will then switch to venetoclax and azacitidine as per [facility] trial.  I will also send a referral to one  of the BMT physicians to get him established with bone marrow transplant.    He has now been started on imetelstat.  Tolerated this well.  He finally completed the MRI liver/gloria scan is consistent with significantly increased iron deposition in the liver.  Given we have obtained a new baseline.  I will start him on deferiprone.  Discussed about possible side effects with deferiprone including agranulocytosis for which we will continue to monitor his blood work twice weekly.  Continue with imetelstat for now.    I will send a referral to BMT to discuss about pros and cons of transplant.  At this point of time given his reevaluation of M-IPSS to moderate high risk given the complex karyotype this would be appropriate.  However patient lives about 4 hours from here at [place] and do want to avoid/postpone transplant for a longer period of time given transfusion dependence is his primary symptom I will give  imetelstat and maybe even luspatercept a chance.  Given he is more symptomatic we will go back to a hemoglobin threshold of 8.    If ANC continues to be below 0.5 for more than 7 days and he continues to have recurrent infections then we can consider prophylactic antibiotics.     #Elevated ferritin 1676------> 5372  - STOPPED Deferasirox.   - Given further elevation of ferritin we obtained a quantification of liver iron with MRI-STIR  sequencing ( ferriscan) of liver   Which shows levels of 34 mg/g.  - c/w deferiprone 2000 mg 3 times a day.  We will reevaluate iron stores in 5 to 6 months.    Plan:  - okay for Imetelstat.   - c/w twice weekly labs and as needed transfusions at local oncologist.  - If ANC trends below 500 consistently for more than 1 week can start levofloxacin as prophylaxis.   - RTC with me in 1 month with labs prior.     Hematology:  Anemia/Thrombocytopenia:   Transfuse leukocyte reduced and irradiated blood products: 1 unit pRBC if hgb is 7.0-7.9, 2 units pRBCs if Hgb 6.0-6.9 g/dl, 1 unit  platelets if PLT count is <20,000 or <50,000 with clinical bleeding.    Immunocompromised:  As a result of his disease and/or previous treatment. Mr. Willie Charles is immunocompromised. his last ANC is >500 and there is no need for prophylactic antibiotics at this time.     Cardiac:  Mr. Willie Charles has no history of heart disease.     Renal:  Creatinine results reviewed today. Mr. Willie Charles does not have any renal disease.    Hepatic:  Liver function tests reviewed today.    Psychosocial:  Mr. Willie Charles is coping well with his diagnosis.     All other questions and concerns were addressed and answered to Mr. Willie Charles's satisfaction.    Today, I spent a total of 40 minutes of face-to-face and non face-to-face time with Mr. Willie Charles. Time spent included review and discussion of diagnostic test results, patient counseling, and coordination of care.    The longitudinal plan of care for the diagnosis(es)/condition(s) as documented were addressed during this visit. Due to the added complexity in care, I will continue to support Willie in the subsequent management and with ongoing continuity of care.    Haroon Ellis MD    Division of Hematology, Oncology and Transplantation  [facility]  P: [phone]

## 2025-04-16 NOTE — NURSING NOTE
"Chief Complaint   Patient presents with    Oncology Clinic Visit     MDS (myelodysplastic syndrome) with 5q deletion     Port Draw     Labs drawn via port by RN. Port accessed with 20g 3/4\" POWER NEEDLE and vitals WNL. Flushed with saline and heparin. Pt tolerated well. Patient checked into next appointment.       Ama Quiles RN    "

## 2025-04-16 NOTE — PATIENT INSTRUCTIONS
[facility] Triage and after hours / weekends / holidays:  [phone]    Please call the triage or after hours line if you experience a temperature greater than or equal to 100.4, shaking chills, have uncontrolled nausea, vomiting and/or diarrhea, dizziness, shortness of breath, chest pain, bleeding, unexplained bruising, or if you have any other new/concerning symptoms, questions or concerns.      If you are having any concerning symptoms or wish to speak to a provider before your next infusion visit, please call triage to notify them so we can adequately serve you.     If you need a refill on a narcotic prescription or other medication, please call before your infusion appointment.                April 2025 Sunday Monday Tuesday Wednesday Thursday Friday Saturday             1     2     3     4  Happy Birthday!     5       6     7     8     9     10     11     12       13     14     15     16    LAB PERIPHERAL   9:30 AM   (15 min.)   [facility] LAB DRAW   [facility]    RETURN CCSL   9:45 AM   (30 min.)   Haroon Ellis MD   [facility]    ONC INFUSION 4 HR (240 MIN)  12:30 PM   (240 min.)    ONC INFUSION NURSE   [facility] 17     18     19       20     21     22     23     24     25     26       27     28     29     30                                May 2025      [place] Monday Tuesday Wednesday Thursday Friday Saturday                       1     2     3       4     5     6     7     8     9     10       11     12     13     14     15     16     17       18     19     20     21     22     23     24       25     26     27     28     29     30     31                    Recent Results (from the past 24 hours)   Hepatic panel    Collection Time: 04/16/25 10:11 AM   Result Value Ref Range    Protein Total 6.8 6.4 - 8.3 g/dL    Albumin 3.8 3.5 - 5.2 g/dL    Bilirubin Total 0.2 <=1.2 mg/dL    Alkaline Phosphatase 90 40 - 150  U/L    AST 29 0 - 45 U/L    ALT 56 0 - 70 U/L    Bilirubin Direct 0.10 0.00 - 0.30 mg/dL   CBC with platelets and differential    Collection Time: 04/16/25 10:11 AM   Result Value Ref Range    WBC Count 2.2 (L) 4.0 - 11.0 10e3/uL    RBC Count 2.65 (L) 4.40 - 5.90 10e6/uL    Hemoglobin 7.4 (L) 13.3 - 17.7 g/dL    Hematocrit 21.4 (L) 40.0 - 53.0 %    MCV 81 78 - 100 fL    MCH 27.9 26.5 - 33.0 pg    MCHC 34.6 31.5 - 36.5 g/dL    RDW 13.3 10.0 - 15.0 %    Platelet Count 111 (L) 150 - 450 10e3/uL    % Neutrophils 43 %    % Lymphocytes 45 %    % Monocytes 10 %    % Eosinophils 1 %    % Basophils 1 %    % Immature Granulocytes 1 %    NRBCs per 100 WBC 0 <1 /100    Absolute Neutrophils 0.9 (L) 1.6 - 8.3 10e3/uL    Absolute Lymphocytes 1.0 0.8 - 5.3 10e3/uL    Absolute Monocytes 0.2 0.0 - 1.3 10e3/uL    Absolute Eosinophils 0.0 0.0 - 0.7 10e3/uL    Absolute Basophils 0.0 0.0 - 0.2 10e3/uL    Absolute Immature Granulocytes 0.0 <=0.4 10e3/uL    Absolute NRBCs 0.0 10e3/uL   RBC and Platelet Morphology    Collection Time: 04/16/25 10:11 AM   Result Value Ref Range    RBC Morphology Confirmed RBC Indices     Platelet Assessment  Automated Count Confirmed. Platelet morphology is normal.     Automated Count Confirmed. Platelet morphology is normal.

## 2025-04-16 NOTE — PROGRESS NOTES
Infusion Nursing Note:  Willie Charles presents today for Cycle 3 Day 1 imetelstat sodium.    Patient seen by provider today: Yes: Dr. Ellis   present during visit today: Not Applicable.    Note: Pt presents to infusion today feeling well. Pt offers no new concerns following visit with provider today.    TORB Dr. Ellis/Barb Dominguez RN 4/16 @ 1130:  - Ok to proceed with treatment today with ANC 0.9  - Only needs a 15 minute observation now    Intravenous Access:  Implanted Port.    Treatment Conditions:  Lab Results   Component Value Date    HGB 7.4 (L) 04/16/2025    WBC 2.2 (L) 04/16/2025    ANEU 0.6 (L) 07/26/2024    ANEUTAUTO 0.9 (L) 04/16/2025     (L) 04/16/2025        Lab Results   Component Value Date     03/19/2025    POTASSIUM 4.6 03/19/2025    CR 0.90 03/19/2025    JACK 9.2 03/19/2025    BILITOTAL 0.2 04/16/2025    ALBUMIN 3.8 04/16/2025    ALT 56 04/16/2025    AST 29 04/16/2025       Results reviewed, labs did NOT meet treatment parameters: ANC 0.9: see TORB above.      Post Infusion Assessment:  Patient tolerated infusion without incident.  Patient observed for 15 minutes post imetelstat sodium per Dr. Ellis - see TORB above.  Blood return noted pre and post infusion.  Site patent and intact, free from redness, edema or discomfort.  No evidence of extravasations.  Access discontinued per protocol.       Discharge Plan:   Patient declined prescription refills.  Discharge instructions reviewed with: Patient and Family.  Patient and/or family verbalized understanding of discharge instructions and all questions answered.  AVS to patient via [employer].  Patient will return 5/14 for next appointment.   Patient discharged in stable condition accompanied by: self and wife.  Departure Mode: Ambulatory.      Barb Dominguez, KARYN

## 2025-04-16 NOTE — NURSING NOTE
"Oncology Rooming Note    April 16, 2025 10:22 AM   Willie Charles is a 65 year old male who presents for:    Chief Complaint   Patient presents with    Oncology Clinic Visit     MDS (myelodysplastic syndrome) with 5q deletion     Port Draw     Labs drawn via port by RN. Port accessed with 20g 3/4\" POWER NEEDLE and vitals WNL. Flushed with saline and heparin. Pt tolerated well. Patient checked into next appointment.       Initial Vitals: /72   Pulse 74   Temp 98  F (36.7  C) (Oral)   Resp 19   Wt 89.7 kg (197 lb 11.2 oz)   SpO2 98%   BMI 29.62 kg/m   Estimated body mass index is 29.62 kg/m  as calculated from the following:    Height as of 2/19/25: 1.74 m (5' 8.5\").    Weight as of this encounter: 89.7 kg (197 lb 11.2 oz). Body surface area is 2.08 meters squared.  No Pain (0) Comment: Data Unavailable   No LMP for male patient.  Allergies reviewed: Yes  Medications reviewed: Yes    Medications: MEDICATION REFILLS NEEDED TODAY. Provider was notified.  Pharmacy name entered into [facility]:    [id number] - [place], [place] - [address] [facility] - [place], [place] - [address]    Frailty Screening:   Is the patient here for a new oncology consult visit in cancer care? 2. No    PHQ9:  Did this patient require a PHQ9?: No      Clinical concerns: Refill Deferiprone    was notified.      Ama Gore              "

## 2025-04-16 NOTE — TELEPHONE ENCOUNTER
Medication requested: deferiprone 1000 mg tabs tablet    Last prescribing provider: Haroon Ellis MD    Last clinic visit date: today, 4/16/25 with Dr. Ellis    Recommendations for requested medication (if none, N/A): NA    Any other pertinent information (if none, N/A): NA    Refilled: Y/N, if NO, why?    Pended and Routed to Dr. Haroon Ellis

## 2025-04-16 NOTE — LETTER
4/16/2025      Willie Charles  [address]  [facility] [address]      Dear Colleague,    Thank you for referring your patient, Willie Charles, to the [facility]. Please see a copy of my visit note below.    [facility]  HEMATOLOGY & ONCOLOGY  FOLLOW UP VISIT    PATIENT NAME: Willie Charles          MRN # [id number]  YOB: 1960  DATE OF VISIT:  Apr 16, 2025            REFERRING PROVIDER:   Mr. Willie Charles was seen at the request of Arely for further evaluation and/or management.     History of Presenting Illness  Mr. Willie Charles is a pleasant 63 year old male with a past medical history significant for hyperthyroisidism s/p radioactive iodine, HTN  and MDS dx in 7/2023 after he had fatigue for several months found to have bicytopenia with WBC 3.3 and hemoglobin 7.0 and platelets 308 that led to a bone marrow biopsy on 7/19/2023 that showed hypocellular marrow with 10 to 20% cellularity and 2% blasts with megakaryocytic hyperplasia with dysplasia.  Cytogenetics showed 5 q. deletion and NGS showed BCORL1 with a VAF of 3%.  He was started on lenalidomide 5 mg daily on 8/11/2023.  His anemia worsened after a few days and he was then started on concurrent azacitidine in addition to Revlimid on 8/28/2023.  As per records his counts trended down by 9/2023 and his Revlimid was discontinued.  He was continued on 2 more cycles of azacitidine until October 2023.  He did see Dr. Squires at [place] in 11/2023 and was recommended to restart Revlimid starting at 2.5 mg dosing every other day for 3 weeks on and 1 week off.  He was started on 2.5 mg 2 times a week  and has been tolerating that for the last 3 months.He has continued to require about 2-4 prbcs in a month.  Denies any fevers or chills or ongoing bleeding from anywhere.    He has now established with the [facility] clinic and has now been increased to lenalidomide 5mg daily for 3 weeks on 1 week off.      Subjective  Patient is here with wife. He has been started on imetelstat and has tolerating this well. He has been taking the deferipirone. With his new hgb threshold of 7.5 he has been having more symptoms. Denies any new symptoms/new side effects. This cycle he got 4 units of transfusion.  Denies any bleeding from anywhere including BRBPR/Melena.     Past Medical History  No past medical history on file.    Family History  No family history on file.    Social History  Smoking: Never  Alcohol use: occasionally drinks alcohol  Status:   Children/grandchildren: Did not ask.   Occupation: Housing  , .     [employer]  Current Outpatient Medications  Current Outpatient Medications   Medication Sig Dispense Refill     acyclovir (ZOVIRAX) 400 MG tablet Take 400 mg by mouth 2 times daily       aspirin 81 MG EC tablet Take 81 mg by mouth daily       buPROPion (WELLBUTRIN SR) 150 MG 12 hr tablet Take 150 mg by mouth daily.       cyanocobalamin (VITAMIN B-12) 1000 MCG tablet Take 1,000 mcg by mouth daily       deferiprone (FERRIPROX) 1000 MG TABS tablet Take 2 tablets (2,000 mg) by mouth 3 times daily. 180 tablet 0     diltiazem ER COATED BEADS (CARDIZEM CD/CARTIA XT) 180 MG 24 hr capsule Take 180 mg by mouth daily       folic acid (FOLVITE) 1 MG tablet Take 1 tablet (1 mg) by mouth daily. 30 tablet 5     levothyroxine (SYNTHROID/LEVOTHROID) 125 MCG tablet Take 125 mcg by mouth daily.       loratadine (CLARITIN) 10 MG tablet Take 10 mg by mouth daily       metFORMIN (GLUCOPHAGE XR) 500 MG 24 hr tablet Take 500 mg by mouth daily (with dinner)       metoprolol succinate ER (TOPROL XL) 50 MG 24 hr tablet Take 100 mg by mouth daily.       montelukast (SINGULAIR) 10 MG tablet Take 10 mg by mouth at bedtime       polyethylene glycol (MIRALAX) 17 g packet Take 1 packet by mouth daily as needed for constipation.       sildenafil (REVATIO) 20 MG tablet Take 1 tab on empty stomach one hour prior to  "sexual activity; may increase to a max of 5 tabs       tamsulosin (FLOMAX) 0.4 MG capsule Take 0.4 mg by mouth.       timolol, PF, (TIMOPTIC OCUDOSE) 0.5 % ophthalmic solution Place 1 drop into both eyes daily.       REVLIMID 10 MG CAPS capsule Take 10 mg by mouth daily (Patient not taking: Reported on 2025)         Allergies  Allergies   Allergen Reactions     Benazepril Other (See Comments)     Critical     Penicillin V Rash       Review of systems  A complete ROS was performed and was negative except as mentioned in HPI    Physical Exam  Vitals:    25 1004   BP: 133/72   Pulse: 74   Resp: 19   Temp: 98  F (36.7  C)   TempSrc: Oral   SpO2: 98%   Weight: 89.7 kg (197 lb 11.2 oz)       Wt Readings from Last 3 Encounters:   25 89.7 kg (197 lb 11.2 oz)   25 88.3 kg (194 lb 9.6 oz)   25 88.8 kg (195 lb 11.2 oz)     ECO   male sitting in chair in NAD  HEET: NC/AT, EOMI w/ PERRL, anicteric sclera. MMM. No mouth sores.   Neck: supple no JVP  CV: normal S1,S2 with RRR no m/r/g  Resp: good air movement b/l. No wheezes or crackles  Abd: soft, NTND, no organomegaly or masses. BS normoactive.   Ext: WWP no edema or cyanosis  Neuro: A&Ox4, no lateralizing sx. Grossly nonfocal. Strength appears preserved.   Lymph: No cervical, supraclavicular, axillary or inguinal LAD  Skin: no concerning lesions or rashes or petechiae      Labs and Imaging    Sodium   Date Value Ref Range Status   2025 139 135 - 145 mmol/L Final    ,   Potassium   Date Value Ref Range Status   2025 4.6 3.4 - 5.3 mmol/L Final      Creatinine   Date Value Ref Range Status   2025 0.90 0.67 - 1.17 mg/dL Final       No results found for: \"LDH\"      Uric Acid   Date Value Ref Range Status   2025 5.0 3.4 - 7.0 mg/dL Final       WBC Count   Date Value Ref Range Status   2025 2.5 (L) 4.0 - 11.0 10e3/uL Final   ,   Hemoglobin   Date Value Ref Range Status   2025 8.0 (L) 13.3 - 17.7 g/dL Final "   ],   Platelet Count   Date Value Ref Range Status   03/19/2025 127 (L) 150 - 450 10e3/uL Final   ,    MCV   Date Value Ref Range Status   03/19/2025 85 78 - 100 fL Final         EPO level was 1213 on 9/2023.        Bone marrow biopsy 7/19/2023  FINAL DIAGNOSIS   Peripheral blood, bone marrow aspirate, biopsy and clot sections ([id number]; 07/19/2023):     1. Myelodysplastic syndrome with isolated del(5q).     2. Two small monotypic B-cell populations (3% and 11% of total events) of uncertain significance, reportedly   detected by flow cytometric analysis. See comment.     COMMENT   The significance of the small monotypic B-cell populations identified by flow cytometric analysis is uncertain, as   diagnostic features of lymphoma are not seen by morphology or demonstrated by immunohistochemistry. These findings may   represent monoclonal B-cell lymphocytosis, although minimal bone marrow involvement by B-cell lymphoma cannot be   excluded. Clinical and radiographic correlation is recommended.  A lymph node or other extramedullary tissue   biopsy is suggested if lymphoma is clinically and/or radiographically suspected.   Cytogenetics 5q deletion.   NGS BCORL1 VAF 3%    Bone marrow 2/21/2024  Peripheral blood, bone marrow aspirate, touch imprint, core biopsy, and clot:     -  Pancytopenia.     -  Inadequate bone marrow aspirate, core biopsy and clot sections.      -  The flow cytometry ([id number]) of the bone marrow shows approximately 3% monotypic B-cells positive for CD5, CD19, CD20, CD23,  and lambda, approximately 14% monotypic B-cells positive for CD5 (variable), CD19, CD20 (dim to negative), CD23 (dim), CD38 (parital) and , with no definitive light chain expression, and no increased blast population identified.   The marrow aspirate is hemodilute. The clot sections show no marrow tissue. The biopsy core shows a tiny area (<5% core sections) with a cellular (~10%) bone marrow showing  tri-lineage cells including clusters of small hypolobated megakaryocytes. The immunohistochemistry on the biopsy sections appears to show increased megakaryocytes and no definitive diagnostic features of lymphoma involvement. The presence of increased small hypolobated megakaryocytes suggests persistent disease of patient's known MDS with 5q deletion. Chromosome studies show that although 20 metaphases are routinely examined, only 1 was identified and appeared normal.   The significance of the two populations of monotypic B-cells identified by flow cytometry is uncertain, as diagnostic features of lymphoma are not seen by morphology or immunohistochemistry in this inadequate marrow biopsy specimen. These findings may represent monoclonal B-cell lymphocytosis of undetermined significance or involvement of the peripheral blood by a B-cell lymphoma, although bone marrow involvement by B-cell lymphoma cannot be excluded.     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   The first clone is characterized by 46 chromosomes and a deletion of the long arm of one copy of chromosome 5. The second clone contains the same deletion of chromosome 5q with the addition of a translocation between the short arm of chromosome 1 and the long arm of chromosome 11, and a deletion within the long arm of one copy of chromosome 7.  Taken together these findings are consistent with the reported morphologic and immunophenotpic finding of recurrent/persistent myelodysplastic syndrome ([id number]) and indicate cytogenetic progression.   The following patient's previously characterized BCORL1 pathogenic mutation was not identified  in the current bone marrow aspirate.  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       MRI liver ferriscan 3/18/2025  FINDINGS:  Average liver iron concentration is 34.4 mg/g dry tissue (normal =  0.17 - 1.8)  Average liver iron concentration is 616 mmol/kg dry tissue (normal = 3  - 33)    Assessment and Plan  Mr. Willie Charles is a 64 year old male who is here for further evaluation and/or management of MDS.    Oncology:  MDS with 5q deletion  WHO Classification: MDS w low blasts and 5q deletion  Date of diagnosis: 7/19/2023  Revised IPSS score: 2.5 Low  Molecular IPSS: -0.42 Moderate low---> mODERATE HIGH  Bone Marrow Blast %: 2  Cytogenetics: 5q del---> 5Q + del 7q + t (1:11) Complex  Molecular: BCORL1 VAF 3%----> None detected except VUS in DNMT3A  Past Treatment: lenalidomide 5mg on 8/11/2023 to 9/2023 with addition of azacitidine since 8/28/2023 until 10/2023.   Current Treatment: Restarted lenalidomide 2.5mg twice a week for 3 weeks on 1 week off since 11/2023. Off since 3/12. Last cycle lenalidomide 10mg starting 8/28. Next cycle lenalidomide 10mg on 9/30. Frank 10mg on 11/4. Frank 10 on 12/30 and Frank 10mg on 1/14    Had an admission 4/18 from 4/22 and stopped lenalidomide on 4/17. Completed 3 weeks on and 1 week of of lenalidomide and restarted on 6/1 for a new cycle.     I discussed the pathophysiology and natural history of MDS with Mr. Willie Charles today. We went over the current prognostic models and scoring systems that are used in clinical practice as well as the potential for disease evolution into acute myeloid leukemia. We went over the current FDA approved treatments for myelodysplastic syndromes and covered that the only curative option is through an allogeneic hematopoietic cell transplantation. His disease is  Moderate low  risk and currently BMT is not recommended.     We discussed that MDS with 5 q. responds well  to lenalidomide and that he is not received adequate amount of treatment with lenalidomide.  Given that he is tolerating lenalidomide at this very low minimal dose I recommended that he increase the lenalidomide to 2.5 mg 5 times a week for 3 weeks on and then 1 week off.  If he continues to tolerate this well we can continue increasing the dose to eventually reach 10 mg 3 weeks on and 1 week off.  He can possibly be started on other agents if his anemia is not responding to lenalidomide including agents like Luspatercept.    He had a recent admission for neutropenic fever s/p antibiotics without blood cultures showing anything.  He did have diffuse ST elevations concerning for stefan/pericarditis.  Course was complicated by A-fib with RVR.  Not sure if the troponin leak was secondary to the RVR.  Though there are rare cases of arrhythmias with lenalidomide doubt if this was related to it.    He has now tolerated a full cycle of lenalidomide 2.5mg daily for 3 weeks and 1 week off.  We increased his lenalidomide to 5mg he has tolerated for 2 cycles with minimal rash that disappeared on its own without any treatment.     He tolerated the lenalidomide 10 mg for 3 weeks on and 1 week off starting 8/28. He has now finished another cycle that started on 9/30 and another cycle from 11/4   Last ANC was 700 on 11/29 and he has not had any infectious complications. We will delay his next cycle to 12/9. Bone marrow around 1/9. He marrow shows improvement in cellularity to normal levels and less dysplasia, however not directly compared as his previous slides were not read by our pathologists. His cytogenetic studies have now finalized and they show a second clone with complex karyotype containing 5 q. deletion along with 1p, 11 q. and 7 q. Deletion.  This is highly concerning and consistent with the progression.    The complex karyotype worries me.  However the primary symptom that he is continuing to have is transfusion  dependence and as we had previously discussed we will start him on imetelstat.  We will give it at least 3 to 4 months, if no response or any other signs of progression we will then switch to venetoclax and azacitidine as per [place] trial.  I will also send a referral to one of the BMT physicians to get him established with bone marrow transplant.    He has now been started on imetelstat.  Tolerated this well.  He finally completed the MRI liver/gloria scan is consistent with significantly increased iron deposition in the liver.  Given we have obtained a new baseline.  I will start him on deferiprone.  Discussed about possible side effects with deferiprone including agranulocytosis for which we will continue to monitor his blood work twice weekly.  Continue with imetelstat for now.    I will send a referral to BMT to discuss about pros and cons of transplant.  At this point of time given his reevaluation of M-IPSS to moderate high risk given the complex karyotype this would be appropriate.  However patient lives about 4 hours from here at [place] and do want to avoid/postpone transplant for a longer period of time given transfusion dependence is his primary symptom I will give  imetelstat and maybe even luspatercept a chance.  Given he is more symptomatic we will go back to a hemoglobin threshold of 8.    If ANC continues to be below 0.5 for more than 7 days and he continues to have recurrent infections then we can consider prophylactic antibiotics.     #Elevated ferritin 1676------> 5372  - STOPPED Deferasirox.   - Given further elevation of ferritin we obtained a quantification of liver iron with MRI-STIR  sequencing ( ferriscan) of liver   Which shows levels of 34 mg/g.  - c/w deferiprone 2000 mg 3 times a day.  We will reevaluate iron stores in 5 to 6 months.    Plan:  - okay for Imetelstat.   - c/w twice weekly labs and as needed transfusions at local oncologist.  - If ANC trends below 500 consistently for more  than 1 week can start levofloxacin as prophylaxis.   - RTC with me in 1 month with labs prior.     Hematology:  Anemia/Thrombocytopenia:   Transfuse leukocyte reduced and irradiated blood products: 1 unit pRBC if hgb is 7.0-7.9, 2 units pRBCs if Hgb 6.0-6.9 g/dl, 1 unit platelets if PLT count is <20,000 or <50,000 with clinical bleeding.    Immunocompromised:  As a result of his disease and/or previous treatment. Mr. Willie Charles is immunocompromised. his last ANC is >500 and there is no need for prophylactic antibiotics at this time.     Cardiac:  Mr. Willie Charles has no history of heart disease.     Renal:  Creatinine results reviewed today. Mr. Willie Charles does not have any renal disease.    Hepatic:  Liver function tests reviewed today.    Psychosocial:  Mr. Willie Charles is coping well with his diagnosis.     All other questions and concerns were addressed and answered to Mr. Willie Charles's satisfaction.    Today, I spent a total of 40 minutes of face-to-face and non face-to-face time with Mr. Willie Charles. Time spent included review and discussion of diagnostic test results, patient counseling, and coordination of care.    The longitudinal plan of care for the diagnosis(es)/condition(s) as documented were addressed during this visit. Due to the added complexity in care, I will continue to support Willie in the subsequent management and with ongoing continuity of care.    Haroon Ellis MD    Division of Hematology, Oncology and Transplantation  [facility]  P: [phone]      Again, thank you for allowing me to participate in the care of your patient.        Sincerely,        Haroon Ellis MD    Electronically signed

## 2025-04-28 ENCOUNTER — TELEPHONE (OUTPATIENT)
Dept: TRANSPLANT | Facility: CLINIC | Age: 65
End: 2025-04-28
Payer: COMMERCIAL

## 2025-05-08 ENCOUNTER — TELEPHONE (OUTPATIENT)
Dept: TRANSPLANT | Facility: CLINIC | Age: 65
End: 2025-05-08
Payer: COMMERCIAL

## 2025-05-12 DIAGNOSIS — D46.9 MDS (MYELODYSPLASTIC SYNDROME) (H): ICD-10-CM

## 2025-05-13 ENCOUNTER — ALLIED HEALTH/NURSE VISIT (OUTPATIENT)
Dept: TRANSPLANT | Facility: CLINIC | Age: 65
End: 2025-05-13

## 2025-05-13 ENCOUNTER — OFFICE VISIT (OUTPATIENT)
Dept: TRANSPLANT | Facility: CLINIC | Age: 65
End: 2025-05-13
Payer: COMMERCIAL

## 2025-05-13 ENCOUNTER — ALLIED HEALTH/NURSE VISIT (OUTPATIENT)
Dept: TRANSPLANT | Facility: CLINIC | Age: 65
End: 2025-05-13
Payer: COMMERCIAL

## 2025-05-13 VITALS
HEIGHT: 69 IN | DIASTOLIC BLOOD PRESSURE: 76 MMHG | HEART RATE: 104 BPM | RESPIRATION RATE: 20 BRPM | OXYGEN SATURATION: 95 % | BODY MASS INDEX: 28.01 KG/M2 | SYSTOLIC BLOOD PRESSURE: 167 MMHG | WEIGHT: 189.1 LBS | TEMPERATURE: 99.5 F

## 2025-05-13 DIAGNOSIS — Z71.9 VISIT FOR COUNSELING: Primary | ICD-10-CM

## 2025-05-13 DIAGNOSIS — D46.C MDS (MYELODYSPLASTIC SYNDROME) WITH 5Q DELETION (H): Primary | ICD-10-CM

## 2025-05-13 LAB
ABO + RH BLD: NORMAL
BLD GP AB SCN SERPL QL: NEGATIVE
SPECIMEN EXP DATE BLD: NORMAL

## 2025-05-13 PROCEDURE — G0463 HOSPITAL OUTPT CLINIC VISIT: HCPCS | Performed by: STUDENT IN AN ORGANIZED HEALTH CARE EDUCATION/TRAINING PROGRAM

## 2025-05-13 ASSESSMENT — PAIN SCALES - GENERAL: PAINLEVEL_OUTOF10: NO PAIN (0)

## 2025-05-13 NOTE — NURSING NOTE
"Oncology Rooming Note    May 13, 2025 1:13 PM   Willie Charles is a 65 year old male who presents for:    No chief complaint on file.    Initial Vitals: BP (!) 167/76 (BP Location: Right arm, Patient Position: Sitting, Cuff Size: Adult Regular)   Pulse 104   Temp 99.5  F (37.5  C) (Oral)   Resp 20   Wt 85.8 kg (189 lb 1.6 oz)   SpO2 95%   BMI 28.33 kg/m   Estimated body mass index is 28.33 kg/m  as calculated from the following:    Height as of 2/19/25: 1.74 m (5' 8.5\").    Weight as of this encounter: 85.8 kg (189 lb 1.6 oz). Body surface area is 2.04 meters squared.  No Pain (0) Comment: Data Unavailable   No LMP for male patient.  Allergies reviewed: Yes  Medications reviewed: Yes    Medications: Medication refills not needed today.  Pharmacy name entered into [employer]:    [id number] - [facility] [address] [place] [address] [facility] - [place], [place] - [address]    Frailty Screening:   Is the patient here for a new oncology consult visit in cancer care? 1. Yes. Over the past month, have you experienced difficulty or required a caregiver to assist with:   1. Balance, walking or general mobility (including any falls)? YES  2. Completion of self-care tasks such as bathing, dressing, toileting, grooming/hygiene?  NO  3. Concentration or memory that affects your daily life?  YES       Clinical concerns: Pt reports having bouts of dizziness when bending down and standing up. Pt also would like to include that he does a lot of home improvement throughout the house for frailty assessment. Dr. Ngo was notified via message.      Marine Pham, EMT     "

## 2025-05-13 NOTE — LETTER
5/13/2025      Willie Charles  [address] [facility] [address]      Dear Colleague,    Thank you for referring your patient, Willie Charles, to the [facility] BLOOD AND MARROW TRANSPLANT PROGRAM [place]. Please see a copy of my visit note below.    [facility] PHYSICIANS  HEMATOLOGY AND MEDICAL ONCOLOGY    CONSULTATION    PATIENT NAME: Willie Charles   MRN# [id number]     Date of Visit: May 13, 2025    Referring Provider: Dr. Haroon Ellis YOB: 1960     Reason for consult: MDS consideration of AlloHSCT    CHIEF COMPLAINT   Oncology Clinic Visit (MDS (myelodysplastic syndrome))     HISTORY OF PRESENTING ILLNESS       MDS low Blasts, now Moderately High risk by IPSS-M  7/2023 BMBx: 10-20% cellular, 2% blasts  Cyto: -5q  NGS: BCORL1 (3%)  Started revlimid and aza  1/9/25 BMBx: 30-40% cellular, 3% blasts  FISH/Cyto: -EGR1 (63.5%), 5/20 with -5q11.2, 15/20 with -5q11.2, t(1;11), -7(q22q32)  NGS: DNMT3A (1.1%)    Date Treatment Response Toxicities/Complications   8/2023 AZA 2 cycles     11/2023 Restart revlimid     2025 Imetelstat for anemia                Patient lives in [place] and gets infusions etc locally from mostly loc tenets. He sees Dr. Ellis since 2023 for his MDS. Recently his cytopenias have become more pronounced and a repeat BMBx showed new DNMT3A mutation, which increases his risk category to moderately high on the IPSS-M. Thus, while his anemia is being treated, he was referred to alloHSCT team for consideration.    He states that his counts are low. He is tired, but still active around the house doing fix-it work etc. He is accompanied to the visit by his wife, and his son who lives in [place].     PAST MEDICAL HISTORY   No past medical history on file.     PAST SURGICAL HISTORY     No past surgical history on file.      CURRENT OUTPATIENT MEDICATIONS     Current Outpatient Medications   Medication Sig Dispense Refill     acyclovir (ZOVIRAX) 400  MG tablet Take 400 mg by mouth 2 times daily       aspirin 81 MG EC tablet Take 81 mg by mouth daily       buPROPion (WELLBUTRIN SR) 150 MG 12 hr tablet Take 150 mg by mouth daily.       cyanocobalamin (VITAMIN B-12) 1000 MCG tablet Take 1,000 mcg by mouth daily       diltiazem ER COATED BEADS (CARDIZEM CD/CARTIA XT) 180 MG 24 hr capsule Take 180 mg by mouth daily       folic acid (FOLVITE) 1 MG tablet Take 1 tablet (1 mg) by mouth daily. 30 tablet 5     levothyroxine (SYNTHROID/LEVOTHROID) 125 MCG tablet Take 125 mcg by mouth daily.       loratadine (CLARITIN) 10 MG tablet Take 10 mg by mouth daily       metFORMIN (GLUCOPHAGE XR) 500 MG 24 hr tablet Take 500 mg by mouth daily (with dinner)       metoprolol succinate ER (TOPROL XL) 50 MG 24 hr tablet Take 100 mg by mouth daily.       montelukast (SINGULAIR) 10 MG tablet Take 10 mg by mouth at bedtime       timolol, PF, (TIMOPTIC OCUDOSE) 0.5 % ophthalmic solution Place 1 drop into both eyes daily.       deferiprone (FERRIPROX) 1000 MG TABS tablet TAKE 2 TABLETS (2,000 MG) BY MOUTH THREE TIMES A DAY. 180 tablet 0     polyethylene glycol (MIRALAX) 17 g packet Take 1 packet by mouth daily as needed for constipation.       REVLIMID 10 MG CAPS capsule Take 10 mg by mouth daily (Patient not taking: Reported on 5/13/2025)       sildenafil (REVATIO) 20 MG tablet Take 1 tab on empty stomach one hour prior to sexual activity; may increase to a max of 5 tabs (Patient not taking: Reported on 5/13/2025)       tamsulosin (FLOMAX) 0.4 MG capsule Take 0.4 mg by mouth. (Patient not taking: Reported on 5/13/2025)       No current facility-administered medications for this visit.        ALLERGIES     Allergies   Allergen Reactions     Benazepril Other (See Comments)     Critical     Penicillin V Rash     .     SOCIAL HISTORY     Social History     Socioeconomic History     Marital status:      Spouse name: Not on file     Number of children: Not on file     Years of education:  Not on file     Highest education level: Not on file   Occupational History     Not on file   Tobacco Use     Smoking status: Never     Passive exposure: Past     Smokeless tobacco: Never   Substance and Sexual Activity     Alcohol use: Yes     Comment: on occassion     Drug use: Never     Sexual activity: Not on file   Other Topics Concern     Not on file   Social History Narrative     Not on file     Social Drivers of Health     Financial Resource Strain: Low Risk  (4/21/2024)    Received from [facility]    Overall Financial Resource Strain (CARDIA)      Difficulty of Paying Living Expenses: Not hard at all   Food Insecurity: No Food Insecurity (4/21/2024)    Received from [facility]    Hunger Vital Sign      Worried About Running Out of Food in the Last Year: Never true      Ran Out of Food in the Last Year: Never true   Transportation Needs: No Transportation Needs (4/21/2024)    Received from [facility]    PRAPARE - Transportation      Lack of Transportation (Medical): No      Lack of Transportation (Non-Medical): No   Physical Activity: Not on file   Stress: Not on file   Social Connections: Not on file   Interpersonal Safety: Not on file   Housing Stability: Low Risk  (4/21/2024)    Received from [facility]    Housing Stability Vital Sign      Unable to Pay for Housing in the Last Year: No      Number of Places Lived in the Last Year: 1      Unstable Housing in the Last Year: No          FAMILY HISTORY   No family history on file.       REVIEW OF SYSTEMS   Pertinent positives have been included in HPI;  Review Of Systems  General: negative for fever, chills or night sweats. No fatigue  Skin: negative for rash  Eyes: negative for visual blurring  Ears/Nose/Throat: negative for hearing loss  Respiratory: negative for shortness of breath, dyspnea on exertion, cough, or hemoptysis  Cardiovascular: negative for palpitations and  "tachycardia  Gastrointestinal: negative for nausea, vomiting and abdominal pain  Genitourinary: negative for nocturia and dysuria  Musculoskeletal: negative for muscular pain or bone pain  Neurologic: negative for migraine headaches  Psychiatric: negative for anxiety  Hematologic/Lymphatic/Immunologic: as per hpi  Endocrine: negative for heat or cold intolerance       PHYSICAL EXAM   BP (!) 167/76 (BP Location: Right arm, Patient Position: Sitting, Cuff Size: Adult Regular)   Pulse 104   Temp 99.5  F (37.5  C) (Oral)   Resp 20   Ht 1.74 m (5' 8.5\")   Wt 85.8 kg (189 lb 1.6 oz)   SpO2 95%   BMI 28.33 kg/m      Wt Readings from Last 3 Encounters:   05/13/25 85.8 kg (189 lb 1.6 oz)   04/16/25 89.7 kg (197 lb 11.2 oz)   03/19/25 88.3 kg (194 lb 9.6 oz)     General appearance: pleasant, not in acute distress  Eyes, Ears, Nose, Throat & Mouth:pupils equal and reactive to light and accommodation, extraocular movements intact, no icterus, injection or pallor. Oropharynx is clear.  Neck: supple, see hem  Respiratory: clear to auscultation bilaterally  Cardiovascular: regular, no murmurs, rubs, or gallops  Gastrointenstinal: soft, non-tender, non-distended, normal bowel sounds  Extremities: warm, well perfused, no edema  Neurologic: Alert and oriented to person, place and time, Cranial nerves 2-12 intact, intact sensation to light touch, muscle strength 5/5 in 4 extremities  Skin: no rash  Hematologic/Lymph: no cervical lymphadenopathy     LABORATORY AND IMAGING STUDIES     Recent Labs   Lab Test 04/16/25  1011 03/19/25  1249 02/19/25  0739   WBC 2.2* 2.5* 2.1*   RBC 2.65* 2.72* 2.77*   HGB 7.4* 8.0* 7.9*   HCT 21.4* 23.0* 24.0*   MCV 81 85 87   MCH 27.9 29.4 28.5   MCHC 34.6 34.8 32.9   RDW 13.3 14.5 16.0*   * 127* 175   NEUTROPHIL 43 43 41   LYMPH 45 46 48   ANEU 0.9* 1.1* 0.9*   ALYM 1.0 1.1 1.0   ALFRED 0.2 0.2 0.2   AEOS 0.0 0.0 0.0     Recent Labs   Lab Test 03/19/25  1249 02/19/25  0739 02/07/25  1114   NA " "139 139 140   POTASSIUM 4.6 4.5 4.1   CHLORIDE 106 107 108*   CO2 27 26 26   ANIONGAP 6* 6* 6*   * 175* 181*   BUN 14.3 17.2 14.5   CR 0.90 0.87 0.88   JACK 9.2 8.7* 8.6*     Recent Labs   Lab Test 04/16/25  1011 03/19/25  1249 02/19/25  0739   BILITOTAL 0.2 0.3 0.3   ALKPHOS 90 110 105   AST 29 39 41   ALT 56 82* 91*     No results for input(s): \"LDH\" in the last 35347 hours.  No lab results found.  No lab results found.    Invalid input(s): \"IED\"  No lab results found.  No lab results found.    Invalid input(s): \"4\"    Results for orders placed or performed during the hospital encounter of 03/18/25   MR [facility]    Narrative    MRI FOR LIVER IRON CONCENTRATION (FERRISCAN)    CLINICAL HISTORY: Myelodysplastic syndrome. Evaluate for iron  overload.    Comparison: None    TECHNIQUE: Sequential non-contrast spin-echo MRI imaging obtained of  the liver with TR 2500 msec and progressively longer Andrea up to 18  msec.    FINDINGS:  Average liver iron concentration is 34.4 mg/g dry tissue (normal =  0.17 - 1.8)  Average liver iron concentration is 616 mmol/kg dry tissue (normal = 3  - 33)    Other findings: None        Impression    IMPRESSION:  Significantly elevated liver iron concentration.    BENOIT VAN MD         SYSTEM ID:  [id number]     No results found for: \"PATH\"     ECOG PS: 1   ASSESSMENT AND RECOMMENDATIONS     #MDS low blasts with -5q  #Moderate-High Risk MDS by IPSS-M  IPSS-M moderately high risk  which is calculated out as estOS of 2.8 years. We also looked at his risk from transplant.    HCTCI 5-6 (age 1, Afib 1, LVEF 50% 1, DM 1, depression 1, chronic hepatitis(?) 1) with est NRM of 35% at 2 years and CIBMTR DRI of 33% overall, survival at 2 years.    We reviewed the overall steps and process of transplant. We discussed that transplant would front-load his risk, but has a good chance of curing him with long term MDS-free survival. He asked me what his prognosis after cure would be and we " "discussed that \"he would live to die of something else.\" His son and wife were present for this conversation, and they are unsure about which direction to proceed. He will start the process for transplant and discuss with his RMD, Dr. Ellis, as well as his family to decide what his path forward should look like.     Willie Charles is a 65 year old male with a malignancy that is currently incurable by standard chemotherapeutic approaches. To date, the only modality that offers a chance at long-term survival and potential cure is allogeneic hematopoietic stem cell transplantation.  Based upon my assessment of disease risk and comorbidities, Willie is a suitable candidate for allogeneic HCT.      We had a detailed discussion about the rationale for transplant in this situation, requirements for proceeding, which include insurance approval, identification of an adequate source of donor hematopoetic progenitor cells, availability of a full-time caregiver along with residence within 45 minutes of the [facility] through at least day +100 post transplant, and compliance with the immunosuppression and supportive care medication regimen prescribed by providers [facility] the [facility]      We had a manuel discussion about the risks of the transplant itself, including toxicities from the preparative regimen, severe infections, the need for red blood cell and platelet transfusion support, the risk of graft rejection, the risk of acute and chronic fxuwk-noqjwy-jkwj disease, the need for immunosuppressive medications, the potential for severe organ toxicity including lungs, liver, skin, and kidneys, the possibility of long-term disability, and the risk of death due to complications of the transplant.  We discussed the risk of disease relapse despite going forward with a transplant.  Willie expressed understanding of these risks.    Plan:    Referring Oncologist Office will do the following:  Continue with therapy as prior  We will " coordinate timeline together      BMT Office will do the following  Hepatology consultation  URD evaluation for now, would consider haplo from children should no URD be acceptable    Primary Desired Protocol: MT 2022-52  Arm: flu/cy/tbi  Preferred Graft Source: PBSC    Alternate/Back-up Protocol: OPTIMIZE, TRX, or ACCELERATE TRIAL    Clinical Trials Discussed: Yes: as noted above  Approximate Timeline to workup: TBD, likely at least 4 months from reading Dr. Ellis's note    Orders placed for interim testing prior to transplant workup: Yes      #Iron Overload from transfusions  On iron chelation. Frequent elevations of LFTs likely related. Would likely benefit from hepatology evaluation as well as ongoing iron chelation as able.     #afib RVR with IC-RBBB  #LVEF 50%  #Mild AI, no other significant valvular disease  #DM II  #Anxiety/depression on welbutrin  #BPH  #ED   #Hypothyroidism  #Grave's disease s/p radioactive iodine      Stevie Ngo DO    Hematology, Oncology, and Transplant  [facility]    The longitudinal plan of care for the diagnosis(es)/condition(s) as documented were addressed during this visit. Due to the added complexity in care, I will continue to support Willie in the subsequent management and with ongoing continuity of care.    I spent a total of 109 minutes on the day of the visit.   Time spent by me today doing chart review, history and exam, documentation and further activities per the note      Again, thank you for allowing me to participate in the care of your patient.        Sincerely,        Stevie Ngo, DO    Electronically signed

## 2025-05-13 NOTE — TELEPHONE ENCOUNTER
"Deferiprone 1000mg tab  Last prescribing provider: Dr. Ellis    Last clinic visit date: 4/16/25    Recommendations for requested medication (if none, N/A): 4/16/25, \"c/w deferiprone 2000 mg 3 times a day.  We will reevaluate iron stores in 5 to 6 months.\"    Any other pertinent information (if none, N/A): sees Dr. Ellis again on 5/14.     Refilled: Y/N, if NO, why?    "

## 2025-05-13 NOTE — NURSING NOTE
BMT TORRES Frailty assessment completed with patient in clinic. Patient had no questions and expressed understanding of what assessment was being done.     Marine Pham, EMT

## 2025-05-13 NOTE — PROGRESS NOTES
Blood and Marrow Transplant   New Transplant Visit with   Clinical     Assessment completed on 2025 in the [address] [place] [address] BMT Clinic. Information for this assessment was provided by pt and pt's Spouse/Partner and step-son Romario report, consultation with medical team, and medical chart review.     Present:  Patient: Willie Charles  Spouse/Partner : Monica  : KINJAL Resendez, [facility]    Medical Team   Nurse Coordinator:Yanira Reaves RN  BMT Physician: Stevie Ngo MD      Diagnosis: Myelodysplastic Syndrome (MDS)  Diagnosis Date: 2023    Presenting Information:  Pt is a 65 year old male diagnosed with Myelodysplastic Syndrome (MDS) . Pt was diagnosed in 2023. Pt presents for Allogeneic stem cell transplant discussion.    Contact Information:  Home Phone [phone]   Mobile [phone]       Special Needs:    No needs identified at this time.     Relocation Requirement:     Pt lives at [address] [place] [address] (approximately 4 hour and 3 minutes from [facility]). Pt will need to relocate and will need local lodging. SW discussed relocation and explained in detail the different lodging options. Pt and and their caregiver are in agreement with relocating.    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     Spouse Employed: Retired      Insurance: [employer] [place]  Payer/Plan Subscriber Name Rel Member # Group #    . No insurance concerns identified at this time. SW provided information regarding the insurance authorization process and the role of the BMT  Financial . DARA provided contact info for the BMT Financial  and referred pt to them for future insurance questions.     Finances:   Pt's source of income is long-term and Other pt is the beneficiary of his first wife's teacher's pension. Pt identified financial concern related to BMT including overall cost, what insurance would cover and in general had lots of questions around finances . SW discussed brandt options and asked pt to let SW know if they would like to apply in the future. SW also discussed the Financial  and their process.    Caregiver:   DARA discussed with the Pt and  Pt's Spouse/Partner the caregiver role and expectation at length. Pt is agreeable to having a full time caregiver for the minimum of 100 Days Caregiving, 100 days no driving until cleared by the BMT Physician. Pt's identified caregivers are Spouse/Partner. Caregiver education and information provided. No caregiver concerns identified.     Healthcare Directive:  Yes. Pt has a health care directive and will bring it when they return to the BMT program.      Resources Provided:  -BMT Information Book  -BMT Resources Packet  -Healthcare Directive  -Honoring Choices - Your Rights: Making Your Own Health Care Treatment Decisions  -Caregiver Contract/Description  -Transplant Unit Description and Information   -Lodging Resources    Identified Concerns:  Pt's wife had many concerns around the cost and finances. SW discussed grants but was unable to provide a number of cost due to each insurance being different in their coverage. DARA did give pt the financial  information.     Summary:  Pt presents to [address] [place] [address] BMT Clinic regarding an Allogeneic  transplant. Pt and pt's Spouse/Partner as well as his step-son Romario asked good/appropriate questions regarding psychosocial factors related to BMT; all questions were addressed. Pt presented as struggling. Pt's affect was blunted. Patient indicated  they are currently feeling overwhelmed. Family's affect was blunted.    Plan:   SW provided contact information and encouraged pt to contact SW with any additional questions, concerns, resources and/or for support. SW will continue to follow pt to provide support and guidance with resources as needed.     KINJAL Resendez, [facility]  Adult Blood & Marrow Transplant   Phone: [phone]  VOCERA Searchable at BMT SW 4

## 2025-05-13 NOTE — PROGRESS NOTES
Blood and Marrow Transplant - New Evaluation Appointment    Spoke with Aisha and Romario, patient's spouse and child, following visit with Dr. Ngo. I explained the role of the nurse coordinator throughout the process, as well as general time line and expectations for next steps. We discussed the necessity of a caregiver and the program's proximity requirements. All questions were answered.     Plan: Allogeneic Transplant, pending patient decision    Timeline Notes: No set timeline yet - awaiting Dr. Ellis's insight on next cycles of therapy.     Contact information provided for :  yes    Allo:  HLA typing drawn: Yes    PRA typing drawn:  Yes    CMV-IgG and ABO-Rh drawn or in record: Yes    Contact information provided for : Yes    Will sibling typing kits need to be sent? No    Financial Release for URD search obtained:  Yes    Phase Status updated: yes    BMT FRAILTY ASSESSMENT (55+)  BMT Fried Frailty          5/13/2025    12:59   Fried Frailty   Lost>10 pounds unintenionally last year N   Exhaustion Score 0   Slowness Score 0   Weakness/ Strength Score 1   Low Activity Level Score 1   Final Score Not Frail   Final Score Number 2   Sit Stand Assessment   Patient able to perform 5 chair stands Y   Chair Stands in seconds 16   Patient is able to perform stand with Feet Side by Side? Y   First attempt (in seconds): 10   Patient is able to perform Semi-Tandem Stand? Y   First attemp (in seconds): 10   Patient is able to perform Tandem Stand? Y   First attemp (in seconds): 10        Pt is 55+, documented frail, internal: No  If YES, order Cancer Rehab Referral    Yanira Reaves RN, MSN, BMTCN  BMT & Cellular Therapy Nurse Coordinator  [facility] Blood and Marrow Transplant Program  Phone: [phone]  Fax: [phone]

## 2025-05-13 NOTE — PROGRESS NOTES
[facility] PHYSICIANS  HEMATOLOGY AND MEDICAL ONCOLOGY    CONSULTATION    PATIENT NAME: Willie Charles   MRN# [id number]     Date of Visit: May 13, 2025    Referring Provider: Dr. Haroon Ellis YOB: 1960     Reason for consult: MDS consideration of AlloHSCT    CHIEF COMPLAINT   Oncology Clinic Visit (MDS (myelodysplastic syndrome))     HISTORY OF PRESENTING ILLNESS       MDS low Blasts, now Moderately High risk by IPSS-M  7/2023 BMBx: 10-20% cellular, 2% blasts  Cyto: -5q  NGS: BCORL1 (3%)  Started revlimid and aza  1/9/25 BMBx: 30-40% cellular, 3% blasts  FISH/Cyto: -EGR1 (63.5%), 5/20 with -5q11.2, 15/20 with -5q11.2, t(1;11), -7(q22q32)  NGS: DNMT3A (1.1%)    Date Treatment Response Toxicities/Complications   8/2023 AZA 2 cycles     11/2023 Restart revlimid     2025 Imetelstat for anemia                Patient lives in [place] and gets infusions etc locally from mostly [facility]. He sees Dr. Ellis since 2023 for his MDS. Recently his cytopenias have become more pronounced and a repeat BMBx showed new DNMT3A mutation, which increases his risk category to moderately high on the IPSS-M. Thus, while his anemia is being treated, he was referred to alloHSCT team for consideration.    He states that his counts are low. He is tired, but still active around the house doing fix-it work etc. He is accompanied to the visit by his wife, and his son who lives in [place].     PAST MEDICAL HISTORY   No past medical history on file.     PAST SURGICAL HISTORY     No past surgical history on file.      CURRENT OUTPATIENT MEDICATIONS     Current Outpatient Medications   Medication Sig Dispense Refill    acyclovir (ZOVIRAX) 400 MG tablet Take 400 mg by mouth 2 times daily      aspirin 81 MG EC tablet Take 81 mg by mouth daily      buPROPion (WELLBUTRIN SR) 150 MG 12 hr tablet Take 150 mg by mouth daily.      cyanocobalamin (VITAMIN B-12) 1000 MCG tablet Take 1,000 mcg by mouth daily       diltiazem ER COATED BEADS (CARDIZEM CD/CARTIA XT) 180 MG 24 hr capsule Take 180 mg by mouth daily      folic acid (FOLVITE) 1 MG tablet Take 1 tablet (1 mg) by mouth daily. 30 tablet 5    levothyroxine (SYNTHROID/LEVOTHROID) 125 MCG tablet Take 125 mcg by mouth daily.      loratadine (CLARITIN) 10 MG tablet Take 10 mg by mouth daily      metFORMIN (GLUCOPHAGE XR) 500 MG 24 hr tablet Take 500 mg by mouth daily (with dinner)      metoprolol succinate ER (TOPROL XL) 50 MG 24 hr tablet Take 100 mg by mouth daily.      montelukast (SINGULAIR) 10 MG tablet Take 10 mg by mouth at bedtime      timolol, PF, (TIMOPTIC OCUDOSE) 0.5 % ophthalmic solution Place 1 drop into both eyes daily.      deferiprone (FERRIPROX) 1000 MG TABS tablet TAKE 2 TABLETS (2,000 MG) BY MOUTH THREE TIMES A DAY. 180 tablet 0    polyethylene glycol (MIRALAX) 17 g packet Take 1 packet by mouth daily as needed for constipation.      REVLIMID 10 MG CAPS capsule Take 10 mg by mouth daily (Patient not taking: Reported on 5/13/2025)      sildenafil (REVATIO) 20 MG tablet Take 1 tab on empty stomach one hour prior to sexual activity; may increase to a max of 5 tabs (Patient not taking: Reported on 5/13/2025)      tamsulosin (FLOMAX) 0.4 MG capsule Take 0.4 mg by mouth. (Patient not taking: Reported on 5/13/2025)       No current facility-administered medications for this visit.        ALLERGIES     Allergies   Allergen Reactions    Benazepril Other (See Comments)     Critical    Penicillin V Rash     .     SOCIAL HISTORY     Social History     Socioeconomic History    Marital status:      Spouse name: Not on file    Number of children: Not on file    Years of education: Not on file    Highest education level: Not on file   Occupational History    Not on file   Tobacco Use    Smoking status: Never     Passive exposure: Past    Smokeless tobacco: Never   Substance and Sexual Activity    Alcohol use: Yes     Comment: on occassion    Drug use: Never     Sexual activity: Not on file   Other Topics Concern    Not on file   Social History Narrative    Not on file     Social Drivers of Health     Financial Resource Strain: Low Risk  (4/21/2024)    Received from [facility]    Overall Financial Resource Strain (CARDIA)     Difficulty of Paying Living Expenses: Not hard at all   Food Insecurity: No Food Insecurity (4/21/2024)    Received from [facility]    Hunger Vital Sign     Worried About Running Out of Food in the Last Year: Never true     Ran Out of Food in the Last Year: Never true   Transportation Needs: No Transportation Needs (4/21/2024)    Received from [facility]    PRAPARE - Transportation     Lack of Transportation (Medical): No     Lack of Transportation (Non-Medical): No   Physical Activity: Not on file   Stress: Not on file   Social Connections: Not on file   Interpersonal Safety: Not on file   Housing Stability: Low Risk  (4/21/2024)    Received from [facility]    Housing Stability Vital Sign     Unable to Pay for Housing in the Last Year: No     Number of Places Lived in the Last Year: 1     Unstable Housing in the Last Year: No          FAMILY HISTORY   No family history on file.       REVIEW OF SYSTEMS   Pertinent positives have been included in HPI;  Review Of Systems  General: negative for fever, chills or night sweats. No fatigue  Skin: negative for rash  Eyes: negative for visual blurring  Ears/Nose/Throat: negative for hearing loss  Respiratory: negative for shortness of breath, dyspnea on exertion, cough, or hemoptysis  Cardiovascular: negative for palpitations and tachycardia  Gastrointestinal: negative for nausea, vomiting and abdominal pain  Genitourinary: negative for nocturia and dysuria  Musculoskeletal: negative for muscular pain or bone pain  Neurologic: negative for migraine headaches  Psychiatric: negative for anxiety  Hematologic/Lymphatic/Immunologic: as per  "hpi  Endocrine: negative for heat or cold intolerance       PHYSICAL EXAM   BP (!) 167/76 (BP Location: Right arm, Patient Position: Sitting, Cuff Size: Adult Regular)   Pulse 104   Temp 99.5  F (37.5  C) (Oral)   Resp 20   Ht 1.74 m (5' 8.5\")   Wt 85.8 kg (189 lb 1.6 oz)   SpO2 95%   BMI 28.33 kg/m      Wt Readings from Last 3 Encounters:   05/13/25 85.8 kg (189 lb 1.6 oz)   04/16/25 89.7 kg (197 lb 11.2 oz)   03/19/25 88.3 kg (194 lb 9.6 oz)     General appearance: pleasant, not in acute distress  Eyes, Ears, Nose, Throat & Mouth:pupils equal and reactive to light and accommodation, extraocular movements intact, no icterus, injection or pallor. Oropharynx is clear.  Neck: supple, see hem  Respiratory: clear to auscultation bilaterally  Cardiovascular: regular, no murmurs, rubs, or gallops  Gastrointenstinal: soft, non-tender, non-distended, normal bowel sounds  Extremities: warm, well perfused, no edema  Neurologic: Alert and oriented to person, place and time, Cranial nerves 2-12 intact, intact sensation to light touch, muscle strength 5/5 in 4 extremities  Skin: no rash  Hematologic/Lymph: no cervical lymphadenopathy     LABORATORY AND IMAGING STUDIES     Recent Labs   Lab Test 04/16/25  1011 03/19/25  1249 02/19/25  0739   WBC 2.2* 2.5* 2.1*   RBC 2.65* 2.72* 2.77*   HGB 7.4* 8.0* 7.9*   HCT 21.4* 23.0* 24.0*   MCV 81 85 87   MCH 27.9 29.4 28.5   MCHC 34.6 34.8 32.9   RDW 13.3 14.5 16.0*   * 127* 175   NEUTROPHIL 43 43 41   LYMPH 45 46 48   ANEU 0.9* 1.1* 0.9*   ALYM 1.0 1.1 1.0   ALFRED 0.2 0.2 0.2   AEOS 0.0 0.0 0.0     Recent Labs   Lab Test 03/19/25  1249 02/19/25  0739 02/07/25  1114    139 140   POTASSIUM 4.6 4.5 4.1   CHLORIDE 106 107 108*   CO2 27 26 26   ANIONGAP 6* 6* 6*   * 175* 181*   BUN 14.3 17.2 14.5   CR 0.90 0.87 0.88   JACK 9.2 8.7* 8.6*     Recent Labs   Lab Test 04/16/25  1011 03/19/25  1249 02/19/25  0739   BILITOTAL 0.2 0.3 0.3   ALKPHOS 90 110 105   AST 29 39 41 " "  ALT 56 82* 91*     No results for input(s): \"LDH\" in the last 20101 hours.  No lab results found.  No lab results found.    Invalid input(s): \"IED\"  No lab results found.  No lab results found.    Invalid input(s): \"4\"    Results for orders placed or performed during the hospital encounter of 03/18/25   MR Ta    Narrative    MRI FOR LIVER IRON CONCENTRATION (FERRISCAN)    CLINICAL HISTORY: Myelodysplastic syndrome. Evaluate for iron  overload.    Comparison: None    TECHNIQUE: Sequential non-contrast spin-echo MRI imaging obtained of  the liver with TR 2500 msec and progressively longer Andrea up to 18  msec.    FINDINGS:  Average liver iron concentration is 34.4 mg/g dry tissue (normal =  0.17 - 1.8)  Average liver iron concentration is 616 mmol/kg dry tissue (normal = 3  - 33)    Other findings: None        Impression    IMPRESSION:  Significantly elevated liver iron concentration.    BENOIT VAN MD         SYSTEM ID:  [id number]     No results found for: \"PATH\"     ECOG PS: 1   ASSESSMENT AND RECOMMENDATIONS     #MDS low blasts with -5q  #Moderate-High Risk MDS by IPSS-M  IPSS-M moderately high risk  which is calculated out as estOS of 2.8 years. We also looked at his risk from transplant.    HCTCI 5-6 (age 1, Afib 1, LVEF 50% 1, DM 1, depression 1, chronic hepatitis(?) 1) with est NRM of 35% at 2 years and CIBMTR DRI of 33% overall, survival at 2 years.    We reviewed the overall steps and process of transplant. We discussed that transplant would front-load his risk, but has a good chance of curing him with long term MDS-free survival. He asked me what his prognosis after cure would be and we discussed that \"he would live to die of something else.\" His son and wife were present for this conversation, and they are unsure about which direction to proceed. He will start the process for transplant and discuss with his RMD, Dr. Ellis, as well as his family to decide what his path forward should look like. "     Willie Charles is a 65 year old male with a malignancy that is currently incurable by standard chemotherapeutic approaches. To date, the only modality that offers a chance at long-term survival and potential cure is allogeneic hematopoietic stem cell transplantation.  Based upon my assessment of disease risk and comorbidities, Willie is a suitable candidate for allogeneic HCT.      We had a detailed discussion about the rationale for transplant in this situation, requirements for proceeding, which include insurance approval, identification of an adequate source of donor hematopoetic progenitor cells, availability of a full-time caregiver along with residence within 45 minutes of the [place]  through at least day +100 post transplant, and compliance with the immunosuppression and supportive care medication regimen prescribed by providers at the [facility].      We had a manuel discussion about the risks of the transplant itself, including toxicities from the preparative regimen, severe infections, the need for red blood cell and platelet transfusion support, the risk of graft rejection, the risk of acute and chronic [id number] disease, the need for immunosuppressive medications, the potential for severe organ toxicity including lungs, liver, skin, and kidneys, the possibility of long-term disability, and the risk of death due to complications of the transplant.  We discussed the risk of disease relapse despite going forward with a transplant.  Willie expressed understanding of these risks.    Plan:    Referring Oncologist Office will do the following:  Continue with therapy as prior  We will coordinate timeline together      BMT Office will do the following  Hepatology consultation  URD evaluation for now, would consider haplo from children should no URD be acceptable    Primary Desired Protocol: MT 2022-52  Arm: flu/cy/tbi  Preferred Graft Source: PBSC    Alternate/Back-up Protocol: OPTIMIZE, TRX, or  ACCELERATE TRIAL    Clinical Trials Discussed: Yes: as noted above  Approximate Timeline to workup: TBD, likely at least 4 months from reading Dr. Ellis's note    Orders placed for interim testing prior to transplant workup: Yes      #Iron Overload from transfusions  On iron chelation. Frequent elevations of LFTs likely related. Would likely benefit from hepatology evaluation as well as ongoing iron chelation as able.     #afib RVR with IC-RBBB  #LVEF 50%  #Mild AI, no other significant valvular disease  #DM II  #Anxiety/depression on welbutrin  #BPH  #ED   #Hypothyroidism  #Grave's disease s/p radioactive iodine      Stevie Ngo DO    Hematology, Oncology, and Transplant  [facility]    The longitudinal plan of care for the diagnosis(es)/condition(s) as documented were addressed during this visit. Due to the added complexity in care, I will continue to support Willie in the subsequent management and with ongoing continuity of care.    I spent a total of 109 minutes on the day of the visit.   Time spent by me today doing chart review, history and exam, documentation and further activities per the note

## 2025-05-14 ENCOUNTER — APPOINTMENT (OUTPATIENT)
Dept: LAB | Facility: CLINIC | Age: 65
End: 2025-05-14
Attending: STUDENT IN AN ORGANIZED HEALTH CARE EDUCATION/TRAINING PROGRAM
Payer: COMMERCIAL

## 2025-05-14 ENCOUNTER — ONCOLOGY VISIT (OUTPATIENT)
Dept: ONCOLOGY | Facility: CLINIC | Age: 65
End: 2025-05-14
Attending: STUDENT IN AN ORGANIZED HEALTH CARE EDUCATION/TRAINING PROGRAM
Payer: COMMERCIAL

## 2025-05-14 ENCOUNTER — PATIENT OUTREACH (OUTPATIENT)
Dept: ONCOLOGY | Facility: CLINIC | Age: 65
End: 2025-05-14

## 2025-05-14 VITALS
SYSTOLIC BLOOD PRESSURE: 111 MMHG | TEMPERATURE: 99 F | OXYGEN SATURATION: 97 % | WEIGHT: 190 LBS | DIASTOLIC BLOOD PRESSURE: 61 MMHG | HEART RATE: 93 BPM | RESPIRATION RATE: 16 BRPM | BODY MASS INDEX: 28.47 KG/M2

## 2025-05-14 DIAGNOSIS — D46.9 MDS (MYELODYSPLASTIC SYNDROME) (H): ICD-10-CM

## 2025-05-14 DIAGNOSIS — D46.C MDS (MYELODYSPLASTIC SYNDROME) WITH 5Q DELETION (H): ICD-10-CM

## 2025-05-14 DIAGNOSIS — D46.C MDS (MYELODYSPLASTIC SYNDROME) WITH 5Q DELETION (H): Primary | ICD-10-CM

## 2025-05-14 LAB
ALBUMIN SERPL BCG-MCNC: 3.6 G/DL (ref 3.5–5.2)
ALP SERPL-CCNC: 89 U/L (ref 40–150)
ALT SERPL W P-5'-P-CCNC: 46 U/L (ref 0–70)
ANION GAP SERPL CALCULATED.3IONS-SCNC: 9 MMOL/L (ref 7–15)
AST SERPL W P-5'-P-CCNC: 25 U/L (ref 0–45)
BILIRUB SERPL-MCNC: 0.5 MG/DL
BUN SERPL-MCNC: 22.2 MG/DL (ref 8–23)
CALCIUM SERPL-MCNC: 8.2 MG/DL (ref 8.8–10.4)
CHLORIDE SERPL-SCNC: 103 MMOL/L (ref 98–107)
CREAT SERPL-MCNC: 1.2 MG/DL (ref 0.67–1.17)
EGFRCR SERPLBLD CKD-EPI 2021: 67 ML/MIN/1.73M2
GLUCOSE SERPL-MCNC: 259 MG/DL (ref 70–99)
HCO3 SERPL-SCNC: 22 MMOL/L (ref 22–29)
POTASSIUM SERPL-SCNC: 3.7 MMOL/L (ref 3.4–5.3)
PROT SERPL-MCNC: 6.6 G/DL (ref 6.4–8.3)
SODIUM SERPL-SCNC: 134 MMOL/L (ref 135–145)
URATE SERPL-MCNC: 5.1 MG/DL (ref 3.4–7)

## 2025-05-14 PROCEDURE — 250N000011 HC RX IP 250 OP 636: Performed by: STUDENT IN AN ORGANIZED HEALTH CARE EDUCATION/TRAINING PROGRAM

## 2025-05-14 PROCEDURE — 84550 ASSAY OF BLOOD/URIC ACID: CPT | Performed by: STUDENT IN AN ORGANIZED HEALTH CARE EDUCATION/TRAINING PROGRAM

## 2025-05-14 PROCEDURE — 82565 ASSAY OF CREATININE: CPT | Performed by: STUDENT IN AN ORGANIZED HEALTH CARE EDUCATION/TRAINING PROGRAM

## 2025-05-14 PROCEDURE — 86644 CMV ANTIBODY: CPT | Performed by: STUDENT IN AN ORGANIZED HEALTH CARE EDUCATION/TRAINING PROGRAM

## 2025-05-14 PROCEDURE — G0463 HOSPITAL OUTPT CLINIC VISIT: HCPCS | Performed by: STUDENT IN AN ORGANIZED HEALTH CARE EDUCATION/TRAINING PROGRAM

## 2025-05-14 PROCEDURE — 36591 DRAW BLOOD OFF VENOUS DEVICE: CPT | Performed by: STUDENT IN AN ORGANIZED HEALTH CARE EDUCATION/TRAINING PROGRAM

## 2025-05-14 PROCEDURE — 86901 BLOOD TYPING SEROLOGIC RH(D): CPT | Performed by: STUDENT IN AN ORGANIZED HEALTH CARE EDUCATION/TRAINING PROGRAM

## 2025-05-14 RX ORDER — CEFDINIR 300 MG/1
300 CAPSULE ORAL
COMMUNITY
Start: 2025-05-13 | End: 2025-05-18

## 2025-05-14 RX ORDER — DEFERIPRONE 1000 MG/1
2000 TABLET ORAL 3 TIMES DAILY
Qty: 180 TABLET | Refills: 0 | Status: SHIPPED | OUTPATIENT
Start: 2025-05-14

## 2025-05-14 RX ORDER — HEPARIN SODIUM (PORCINE) LOCK FLUSH IV SOLN 100 UNIT/ML 100 UNIT/ML
5 SOLUTION INTRAVENOUS ONCE
Status: COMPLETED | OUTPATIENT
Start: 2025-05-14 | End: 2025-05-14

## 2025-05-14 RX ORDER — ACETAMINOPHEN 500 MG
500-1000 TABLET ORAL EVERY 4 HOURS PRN
COMMUNITY

## 2025-05-14 RX ORDER — AZITHROMYCIN 250 MG/1
TABLET, FILM COATED ORAL
COMMUNITY
Start: 2025-05-13 | End: 2025-05-18

## 2025-05-14 RX ORDER — METOPROLOL SUCCINATE 100 MG/1
TABLET, EXTENDED RELEASE ORAL
COMMUNITY
Start: 2025-02-15

## 2025-05-14 RX ADMIN — Medication 5 ML: at 12:42

## 2025-05-14 ASSESSMENT — PAIN SCALES - GENERAL: PAINLEVEL_OUTOF10: NO PAIN (0)

## 2025-05-14 NOTE — NURSING NOTE
Chief Complaint   Patient presents with    Oncology Clinic Visit     RTN MDS     Port Draw     Labs drawn via port by RN in lab. VS taken.      Labs drawn via port by RN. Port accessed with 20g flat needle. Flushed with saline and heparin. Pt tolerated well. Vitals taken. Pt checked into next appt.     Zo Sosa RN

## 2025-05-14 NOTE — PROGRESS NOTES
[facility]: Cancer Care Plan of Care Education Note                                    Discussion with Patient:                                                      This writer met with patient and spouse Monica to go over Azacitidine education.      Assessment:                                                      Assessment completed with:: Patient, Spouse or significant other    Plan of Care Education   Yearly learning assessment completed?: Yes (see Education tab)  Diagnosis:: MDS  Does patient understand diagnosis?: Yes  Tx plan/regimen:: Azacitidine/Venetoclax  Does patient understand treatment plan/regimen?: Yes  Preparing for treatment:: Reviewed treatment preparation information with patient (vascular access, day of chemo, visitor policy, what to bring, etc.)  Vascular access education provided for:: Other (comment) (subcutaneous)  Side effect education:: Diarrhea/Constipation, Fatigue, Infection, Lab value monitoring (anemia, neutropenia, thrombocytopenia), Nausea/Vomiting  Safety/self care at home reviewed with patient:: Yes  Coping - concerns/fears reviewed with patient:: Yes  Plan of Care:: KARLO follow-up appointment, Lab appointment, Imaging, MD follow-up appointment, Treatment schedule  When to call provider:: Temperature >100.4F, Shaking chills, New/worsening pain, Increased shortness of breath, Bleeding, Uncontrolled diarrhea/constipation, Uncontrolled nausea/vomiting  Reasons for deferring treatment reviewed with patient:: Yes    Evaluation of Learning  Patient Education Provided: Yes  Readiness:: Acceptance  Method:: Booklet/Handout, Explanation  Response:: Verbalizes understanding      Intervention/Education provided during outreach:                                                       Jim Krause following visit with Dr. Ellis to  discuss treatment and resources available at the [facility].     Reviewed plan for Azacitidine, and PO venetoclax. Highlighted steps to expect when  "getting treatment, including labs, KARLO visits, and infusion appointments.    Provided with \"My Cancer Guidebook\" and [employer] [facility] printout on Azacitidine. Reviewed administration, side effects, and safety guidelines.     Discussed how and when to contact triage team and provided informational sheet on \"When to Call For Help\".      Patient to follow up as scheduled at next appt  Patient to call/[employer] message with updates  Confirmed patient has clinic and triage numbers    Signature:  Abby Reyes RN  "

## 2025-05-14 NOTE — NURSING NOTE
"Oncology Rooming Note    May 14, 2025 1:02 PM   Willie Charles is a 65 year old male who presents for:    Chief Complaint   Patient presents with    Oncology Clinic Visit     RTN MDS     Port Draw     Labs drawn via port by RN in lab. VS taken.      Initial Vitals: /61 (BP Location: Right arm, Patient Position: Sitting, Cuff Size: Adult Regular)   Pulse 93   Temp 99  F (37.2  C) (Oral)   Resp 16   Wt 86.2 kg (190 lb)   SpO2 97%   BMI 28.47 kg/m   Estimated body mass index is 28.47 kg/m  as calculated from the following:    Height as of 5/13/25: 1.74 m (5' 8.5\").    Weight as of this encounter: 86.2 kg (190 lb). Body surface area is 2.04 meters squared.  No Pain (0) Comment: Data Unavailable   No LMP for male patient.  Allergies reviewed: Yes  Medications reviewed: Yes    Medications: Medication refills not needed today.  Pharmacy name entered into [employer]:    [id number] - [place], [place] - [address] [facility] - [place], [place] - [address]    Frailty Screening:   Is the patient here for a new oncology consult visit in cancer care? 2. No    PHQ9:  Did this patient require a PHQ9?: No      Clinical concerns: States was running a fever this morning has been taking tylenol every 4 hrs. Would like to know if will still be able to get infusion today.       Blaine Felipe             "

## 2025-05-14 NOTE — LETTER
5/14/2025      Willie Charles  [address]  [facility] [address]      Dear Colleague,    Thank you for referring your patient, Willie Charles, to the [facility]. Please see a copy of my visit note below.    [facility]  HEMATOLOGY & ONCOLOGY  FOLLOW UP VISIT    PATIENT NAME: Willie Charles          MRN # [id number]  YOB: 1960  DATE OF VISIT:  May 14, 2025            REFERRING PROVIDER:   Mr. Willie Charles was seen at the request of Arely for further evaluation and/or management.     History of Presenting Illness  Mr. Willie Charles is a pleasant 63 year old male with a past medical history significant for hyperthyroisidism s/p radioactive iodine, HTN  and MDS dx in 7/2023 after he had fatigue for several months found to have bicytopenia with WBC 3.3 and hemoglobin 7.0 and platelets 308 that led to a bone marrow biopsy on 7/19/2023 that showed hypocellular marrow with 10 to 20% cellularity and 2% blasts with megakaryocytic hyperplasia with dysplasia.  Cytogenetics showed 5 q. deletion and NGS showed BCORL1 with a VAF of 3%.  He was started on lenalidomide 5 mg daily on 8/11/2023.  His anemia worsened after a few days and he was then started on concurrent azacitidine in addition to Revlimid on 8/28/2023.  As per records his counts trended down by 9/2023 and his Revlimid was discontinued.  He was continued on 2 more cycles of azacitidine until October 2023.  He did see Dr. Squires at [place] in 11/2023 and was recommended to restart Revlimid starting at 2.5 mg dosing every other day for 3 weeks on and 1 week off.  He was started on 2.5 mg 2 times a week  and has been tolerating that for the last 3 months.He has continued to require about 2-4 prbcs in a month.  Denies any fevers or chills or ongoing bleeding from anywhere.    He has now established with the [facility] clinic and has now been increased to lenalidomide 5mg daily for 3 weeks on 1 week off.      Subjective  Patient is here with wife. He has been started on imetelstat and has tolerating this well. He has been taking the deferipirone.  Last cycle he again got 4 units of transfusion for hemoglobin threshold of less than 8.  He was seen in the ER yesterday with ongoing fever and cough with expectoration and was found to have possible left lower base pneumonia and is started on azithromycin and cefdinir.  Blood cultures are currently in process.  Denies any bleeding from anywhere including BRBPR/Melena.     Past Medical History  No past medical history on file.    Family History  No family history on file.    Social History  Smoking: Never  Alcohol use: occasionally drinks alcohol  Status:   Children/grandchildren: Did not ask.   Occupation: Housing  , .     [employer]  Current Outpatient Medications  Current Outpatient Medications   Medication Sig Dispense Refill     acetaminophen (TYLENOL) 500 MG tablet Take 500-1,000 mg by mouth every 4 hours as needed for mild pain.       acyclovir (ZOVIRAX) 400 MG tablet Take 400 mg by mouth 2 times daily       aspirin 81 MG EC tablet Take 81 mg by mouth daily       azithromycin (ZITHROMAX) 250 MG tablet Take by mouth.       buPROPion (WELLBUTRIN SR) 150 MG 12 hr tablet Take 150 mg by mouth daily.       cefdinir (OMNICEF) 300 MG capsule Take 300 mg by mouth.       cyanocobalamin (VITAMIN B-12) 1000 MCG tablet Take 1,000 mcg by mouth daily       deferiprone (FERRIPROX) 1000 MG TABS tablet TAKE 2 TABLETS (2,000 MG) BY MOUTH THREE TIMES A DAY. 180 tablet 0     diltiazem ER COATED BEADS (CARDIZEM CD/CARTIA XT) 180 MG 24 hr capsule Take 180 mg by mouth daily       folic acid (FOLVITE) 1 MG tablet Take 1 tablet (1 mg) by mouth daily. 30 tablet 5     levothyroxine (SYNTHROID/LEVOTHROID) 125 MCG tablet Take 125 mcg by mouth daily.       loratadine (CLARITIN) 10 MG tablet Take 10 mg by mouth daily       metFORMIN (GLUCOPHAGE XR) 500 MG 24 hr tablet  Take 500 mg by mouth daily (with dinner)       metoprolol succinate ER (TOPROL XL) 50 MG 24 hr tablet Take 100 mg by mouth daily.       montelukast (SINGULAIR) 10 MG tablet Take 10 mg by mouth at bedtime       timolol, PF, (TIMOPTIC OCUDOSE) 0.5 % ophthalmic solution Place 1 drop into both eyes daily.       metoprolol succinate ER (TOPROL XL) 100 MG 24 hr tablet  (Patient not taking: Reported on 2025)       polyethylene glycol (MIRALAX) 17 g packet Take 1 packet by mouth daily as needed for constipation. (Patient not taking: Reported on 2025)       REVLIMID 10 MG CAPS capsule Take 10 mg by mouth daily (Patient not taking: Reported on 2025)       sildenafil (REVATIO) 20 MG tablet Take 1 tab on empty stomach one hour prior to sexual activity; may increase to a max of 5 tabs (Patient not taking: Reported on 2025)       tamsulosin (FLOMAX) 0.4 MG capsule Take 0.4 mg by mouth. (Patient not taking: Reported on 2025)         Allergies  Allergies   Allergen Reactions     Benazepril Other (See Comments)     Critical     Penicillin V Rash       Review of systems  A complete ROS was performed and was negative except as mentioned in HPI    Physical Exam  Vitals:    25 1235   BP: 111/61   BP Location: Right arm   Patient Position: Sitting   Cuff Size: Adult Regular   Pulse: 93   Resp: 16   Temp: 99  F (37.2  C)   TempSrc: Oral   SpO2: 97%   Weight: 86.2 kg (190 lb)       Wt Readings from Last 3 Encounters:   25 86.2 kg (190 lb)   25 85.8 kg (189 lb 1.6 oz)   25 89.7 kg (197 lb 11.2 oz)     ECO   male sitting in chair in NAD  HEET: NC/AT, EOMI w/ PERRL, anicteric sclera. MMM. No mouth sores.   Neck: supple no JVP  CV: normal S1,S2 with RRR no m/r/g  Resp: good air movement b/l. No wheezes or crackles  Abd: soft, NTND, no organomegaly or masses. BS normoactive.   Ext: WWP no edema or cyanosis  Neuro: A&Ox4, no lateralizing sx. Grossly nonfocal. Strength appears  "preserved.   Lymph: No cervical, supraclavicular, axillary or inguinal LAD  Skin: no concerning lesions or rashes or petechiae      Labs and Imaging    Sodium   Date Value Ref Range Status   03/19/2025 139 135 - 145 mmol/L Final    ,   Potassium   Date Value Ref Range Status   03/19/2025 4.6 3.4 - 5.3 mmol/L Final      Creatinine   Date Value Ref Range Status   03/19/2025 0.90 0.67 - 1.17 mg/dL Final       No results found for: \"LDH\"      Uric Acid   Date Value Ref Range Status   03/19/2025 5.0 3.4 - 7.0 mg/dL Final       WBC Count   Date Value Ref Range Status   04/16/2025 2.2 (L) 4.0 - 11.0 10e3/uL Final   ,   Hemoglobin   Date Value Ref Range Status   04/16/2025 7.4 (L) 13.3 - 17.7 g/dL Final   ],   Platelet Count   Date Value Ref Range Status   04/16/2025 111 (L) 150 - 450 10e3/uL Final   ,    MCV   Date Value Ref Range Status   04/16/2025 81 78 - 100 fL Final         EPO level was 1213 on 9/2023.        Bone marrow biopsy 7/19/2023  FINAL DIAGNOSIS   Peripheral blood, bone marrow aspirate, biopsy and clot sections ([id number]; 07/19/2023):     1. Myelodysplastic syndrome with isolated del(5q).     2. Two small monotypic B-cell populations (3% and 11% of total events) of uncertain significance, reportedly   detected by flow cytometric analysis. See comment.     COMMENT   The significance of the small monotypic B-cell populations identified by flow cytometric analysis is uncertain, as   diagnostic features of lymphoma are not seen by morphology or demonstrated by immunohistochemistry. These findings may   represent monoclonal B-cell lymphocytosis, although minimal bone marrow involvement by B-cell lymphoma cannot be   excluded. Clinical and radiographic correlation is recommended.  A lymph node or other extramedullary tissue   biopsy is suggested if lymphoma is clinically and/or radiographically suspected.   Cytogenetics 5q deletion.   NGS BCORL1 VAF 3%    Bone marrow 2/21/2024  Peripheral blood, bone " marrow aspirate, touch imprint, core biopsy, and clot:     -  Pancytopenia.     -  Inadequate bone marrow aspirate, core biopsy and clot sections.      -  The flow cytometry ([id number]) of the bone marrow shows approximately 3% monotypic B-cells positive for CD5, CD19, CD20, CD23,  and lambda, approximately 14% monotypic B-cells positive for CD5 (variable), CD19, CD20 (dim to negative), CD23 (dim), CD38 (parital) and , with no definitive light chain expression, and no increased blast population identified.   The marrow aspirate is hemodilute. The clot sections show no marrow tissue. The biopsy core shows a tiny area (<5% core sections) with a cellular (~10%) bone marrow showing tri-lineage cells including clusters of small hypolobated megakaryocytes. The immunohistochemistry on the biopsy sections appears to show increased megakaryocytes and no definitive diagnostic features of lymphoma involvement. The presence of increased small hypolobated megakaryocytes suggests persistent disease of patient's known MDS with 5q deletion. Chromosome studies show that although 20 metaphases are routinely examined, only 1 was identified and appeared normal.   The significance of the two populations of monotypic B-cells identified by flow cytometry is uncertain, as diagnostic features of lymphoma are not seen by morphology or immunohistochemistry in this inadequate marrow biopsy specimen. These findings may represent monoclonal B-cell lymphocytosis of undetermined significance or involvement of the peripheral blood by a B-cell lymphoma, although bone marrow involvement by B-cell lymphoma cannot be excluded.     Bone marrow biopsy 1/9/2025  - Recurrent/persistent myelodysplastic syndrome with the following features:  - Normocellular marrow for age (variable; overall 30-40%) with trilineage hematopoiesis, megakaryocytic hyperplasia with dysmegakaryopoiesis, and 3% blasts  RESULTS:     ABNORMAL  - Loss of EGR1 (63.5%)  Two  related clones were identified in this specimen.   The first clone is characterized by 46 chromosomes and a deletion of the long arm of one copy of chromosome 5. The second clone contains the same deletion of chromosome 5q with the addition of a translocation between the short arm of chromosome 1 and the long arm of chromosome 11, and a deletion within the long arm of one copy of chromosome 7.  Taken together these findings are consistent with the reported morphologic and immunophenotpic finding of recurrent/persistent myelodysplastic syndrome ([id number]) and indicate cytogenetic progression.   The following patient's previously characterized BCORL1 pathogenic mutation was not identified in the current bone marrow aspirate.  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       MRI liver ferriscan 3/18/2025  FINDINGS:  Average liver iron concentration is 34.4 mg/g dry tissue (normal =  0.17 - 1.8)  Average liver iron concentration is 616 mmol/kg dry tissue (normal = 3  - 33)    Assessment and Plan  Mr. Willie Charles is a 64 year old male who is here for further evaluation and/or management of MDS.    Oncology:  MDS with 5q deletion  WHO Classification: MDS w low blasts and 5q deletion  Date of diagnosis: 7/19/2023  Revised IPSS score: 2.5 Low  Molecular IPSS: -0.42 Moderate low---> mODERATE HIGH  Bone Marrow Blast %: 2  Cytogenetics: 5q del---> 5Q + del 7q + t (1:11) Complex  Molecular: BCORL1 VAF 3%----> None detected except VUS in DNMT3A  Past Treatment: lenalidomide 5mg on 8/11/2023 to 9/2023 with addition of azacitidine since 8/28/2023 until 10/2023.   Current Treatment: Restarted lenalidomide 2.5mg twice a week for 3 weeks on 1 week off since 11/2023. Off since 3/12. Last cycle lenalidomide 10mg starting 8/28. Next cycle lenalidomide 10mg on 9/30. Frank 10mg on 11/4. Frank 10 on 12/30 and Frank 10mg on 1/14    Had an admission 4/18 from 4/22  and stopped lenalidomide on 4/17. Completed 3 weeks on and 1 week of of lenalidomide and restarted on 6/1 for a new cycle.     I discussed the pathophysiology and natural history of MDS with Mr. Willie MACE Melvin today. We went over the current prognostic models and scoring systems that are used in clinical practice as well as the potential for disease evolution into acute myeloid leukemia. We went over the current FDA approved treatments for myelodysplastic syndromes and covered that the only curative option is through an allogeneic hematopoietic cell transplantation. His disease is Moderate low risk and currently BMT is not recommended.     We discussed that MDS with 5 q. responds well to lenalidomide and that he is not received adequate amount of treatment with lenalidomide.  Given that he is tolerating lenalidomide at this very low minimal dose I recommended that he increase the lenalidomide to 2.5 mg 5 times a week for 3 weeks on and then 1 week off.  If he continues to tolerate this well we can continue increasing the dose to eventually reach 10 mg 3 weeks on and 1 week off.  He can possibly be started on other agents if his anemia is not responding to lenalidomide including agents like Luspatercept.    He had a recent admission for neutropenic fever s/p antibiotics without blood cultures showing anything.  He did have diffuse ST elevations concerning for stefan/pericarditis.  Course was complicated by A-fib with RVR.  Not sure if the troponin leak was secondary to the RVR.  Though there are rare cases of arrhythmias with lenalidomide doubt if this was related to it.    He has now tolerated a full cycle of lenalidomide 2.5mg daily for 3 weeks and 1 week off.  We increased his lenalidomide to 5mg he has tolerated for 2 cycles with minimal rash that disappeared on its own without any treatment.     He tolerated the lenalidomide 10 mg for 3 weeks on and 1 week off starting 8/28. He has now finished another cycle  that started on 9/30 and another cycle from 11/4   Last ANC was 700 on 11/29 and he has not had any infectious complications. We will delay his next cycle to 12/9. Bone marrow around 1/9. He marrow shows improvement in cellularity to normal levels and less dysplasia, however not directly compared as his previous slides were not read by our pathologists. His cytogenetic studies have now finalized and they show a second clone with complex karyotype containing 5 q. deletion along with 1p, 11 q. and 7 q. Deletion.  This is highly concerning and consistent with the progression.    The complex karyotype worries me.  However the primary symptom that he is continuing to have is transfusion dependence and as we had previously discussed we will start him on imetelstat.  We will give it at least 3 to 4 months, if no response or any other signs of progression we will then switch to venetoclax and azacitidine as per [facility] trial.  I will also send a referral to one of the BMT physicians to get him established with bone marrow transplant.    He has now been started on imetelstat.  Tolerated this well.  He finally completed the MRI liver/gloria scan is consistent with significantly increased iron deposition in the liver.  Given we have obtained a new baseline.  I will start him on deferiprone.  Discussed about possible side effects with deferiprone including agranulocytosis for which we will continue to monitor his blood work twice weekly.  Continue with imetelstat for now.    I will send a referral to BMT to discuss about pros and cons of transplant.  At this point of time given his reevaluation of M-IPSS to moderate high risk given the complex karyotype this would be appropriate.  However patient lives about 4 hours from here at [place] and do want to avoid/postpone transplant for a longer period of time given transfusion dependence is his primary symptom I will give  imetelstat and maybe even luspatercept a chance.  Given he is  more symptomatic we will go back to a hemoglobin threshold of 8.    He continued to have significant transfusion dependence 4 units during last month as well.  Given this and in light of new pneumonia since yesterday we will delay/forego his imetelstat infusion.  Will plan to switch to venetoclax and azacitidine based regimen.  I will plan for repeat bone marrow biopsy after 2 cycles of this and then prepare him for transplant.  He has now established with transplant with Dr. Ngo.  We will check coverage for voriconazole versus posaconazole and start him on antifungal agent when we start this treatment.    If ANC continues to be below 0.5 for more than 7 days and he continues to have recurrent infections then we can consider prophylactic antibiotics.     #Elevated ferritin 1676------> 5372  - STOPPED Deferasirox.   - Given further elevation of ferritin we obtained a quantification of liver iron with MRI-STIR  sequencing ( ferriscan) of liver   Which shows levels of 34 mg/g.  - c/w deferiprone 2000 mg 3 times a day.  We will reevaluate iron stores in 5 to 6 months.    Plan:  - Cancel imetelstat.   - c/w twice weekly labs and as needed transfusions at local oncologist.  - If ANC trends below 500 consistently for more than 1 week can start levofloxacin as prophylaxis.   - RTC with me in  2 weeks with plan to start Vargas/aza same day.     Hematology:  Anemia/Thrombocytopenia:   Transfuse leukocyte reduced and irradiated blood products: 1 unit pRBC if hgb is 7.0-7.9, 2 units pRBCs if Hgb 6.0-6.9 g/dl, 1 unit platelets if PLT count is <20,000 or <50,000 with clinical bleeding.    Immunocompromised:  As a result of his disease and/or previous treatment. Mr. Willie Charles is immunocompromised. his last ANC is >500 and there is no need for prophylactic antibiotics at this time.     Cardiac:  Mr. Willie Charles has no history of heart disease.     Renal:  Creatinine results reviewed today. Mr. Willie Charles does  not have any renal disease.    Hepatic:  Liver function tests reviewed today.    Psychosocial:  Mr. Willie Charles is coping well with his diagnosis.     All other questions and concerns were addressed and answered to Mr. Willie Charles's satisfaction.    Today, I spent a total of 40 minutes of face-to-face and non face-to-face time with Mr. Willie Charles. Time spent included review and discussion of diagnostic test results, patient counseling, and coordination of care.    The longitudinal plan of care for the diagnosis(es)/condition(s) as documented were addressed during this visit. Due to the added complexity in care, I will continue to support Willie in the subsequent management and with ongoing continuity of care.    Haroon Ellis MD    Division of Hematology, Oncology and Transplantation  [facility]  P: [phone]    Again, thank you for allowing me to participate in the care of your patient.        Sincerely,        Haroon Ellis MD    Electronically signed

## 2025-05-14 NOTE — PROGRESS NOTES
[facility]  HEMATOLOGY & ONCOLOGY  FOLLOW UP VISIT    PATIENT NAME: Willie Charles          MRN # [id number]  YOB: 1960  DATE OF VISIT:  May 14, 2025            REFERRING PROVIDER:   Mr. Willie Charles was seen at the request of Arely for further evaluation and/or management.     History of Presenting Illness  Mr. Willie Charles is a pleasant 63 year old male with a past medical history significant for hyperthyroisidism s/p radioactive iodine, HTN  and MDS dx in 7/2023 after he had fatigue for several months found to have bicytopenia with WBC 3.3 and hemoglobin 7.0 and platelets 308 that led to a bone marrow biopsy on 7/19/2023 that showed hypocellular marrow with 10 to 20% cellularity and 2% blasts with megakaryocytic hyperplasia with dysplasia.  Cytogenetics showed 5 q. deletion and NGS showed BCORL1 with a VAF of 3%.  He was started on lenalidomide 5 mg daily on 8/11/2023.  His anemia worsened after a few days and he was then started on concurrent azacitidine in addition to Revlimid on 8/28/2023.  As per records his counts trended down by 9/2023 and his Revlimid was discontinued.  He was continued on 2 more cycles of azacitidine until October 2023.  He did see Dr. Squires at [facility] in 11/2023 and was recommended to restart Revlimid starting at 2.5 mg dosing every other day for 3 weeks on and 1 week off.  He was started on 2.5 mg 2 times a week  and has been tolerating that for the last 3 months.He has continued to require about 2-4 prbcs in a month.  Denies any fevers or chills or ongoing bleeding from anywhere.    He has now established with the [facility] and has now been increased to lenalidomide 5mg daily for 3 weeks on 1 week off.     Subjective  Patient is here with wife. He has been started on imetelstat and has tolerating this well. He has been taking the deferipirone.  Last cycle he again got 4 units of transfusion for hemoglobin threshold of less than 8.  He was seen  in the ER yesterday with ongoing fever and cough with expectoration and was found to have possible left lower base pneumonia and is started on azithromycin and cefdinir.  Blood cultures are currently in process.  Denies any bleeding from anywhere including BRBPR/Melena.     Past Medical History  No past medical history on file.    Family History  No family history on file.    Social History  Smoking: Never  Alcohol use: occasionally drinks alcohol  Status:   Children/grandchildren: Did not ask.   Occupation: Housing  , .     [employer]  Current Outpatient Medications  Current Outpatient Medications   Medication Sig Dispense Refill    acetaminophen (TYLENOL) 500 MG tablet Take 500-1,000 mg by mouth every 4 hours as needed for mild pain.      acyclovir (ZOVIRAX) 400 MG tablet Take 400 mg by mouth 2 times daily      aspirin 81 MG EC tablet Take 81 mg by mouth daily      azithromycin (ZITHROMAX) 250 MG tablet Take by mouth.      buPROPion (WELLBUTRIN SR) 150 MG 12 hr tablet Take 150 mg by mouth daily.      cefdinir (OMNICEF) 300 MG capsule Take 300 mg by mouth.      cyanocobalamin (VITAMIN B-12) 1000 MCG tablet Take 1,000 mcg by mouth daily      deferiprone (FERRIPROX) 1000 MG TABS tablet TAKE 2 TABLETS (2,000 MG) BY MOUTH THREE TIMES A DAY. 180 tablet 0    diltiazem ER COATED BEADS (CARDIZEM CD/CARTIA XT) 180 MG 24 hr capsule Take 180 mg by mouth daily      folic acid (FOLVITE) 1 MG tablet Take 1 tablet (1 mg) by mouth daily. 30 tablet 5    levothyroxine (SYNTHROID/LEVOTHROID) 125 MCG tablet Take 125 mcg by mouth daily.      loratadine (CLARITIN) 10 MG tablet Take 10 mg by mouth daily      metFORMIN (GLUCOPHAGE XR) 500 MG 24 hr tablet Take 500 mg by mouth daily (with dinner)      metoprolol succinate ER (TOPROL XL) 50 MG 24 hr tablet Take 100 mg by mouth daily.      montelukast (SINGULAIR) 10 MG tablet Take 10 mg by mouth at bedtime      timolol, PF, (TIMOPTIC OCUDOSE) 0.5 % ophthalmic  solution Place 1 drop into both eyes daily.      metoprolol succinate ER (TOPROL XL) 100 MG 24 hr tablet  (Patient not taking: Reported on 2025)      polyethylene glycol (MIRALAX) 17 g packet Take 1 packet by mouth daily as needed for constipation. (Patient not taking: Reported on 2025)      REVLIMID 10 MG CAPS capsule Take 10 mg by mouth daily (Patient not taking: Reported on 2025)      sildenafil (REVATIO) 20 MG tablet Take 1 tab on empty stomach one hour prior to sexual activity; may increase to a max of 5 tabs (Patient not taking: Reported on 2025)      tamsulosin (FLOMAX) 0.4 MG capsule Take 0.4 mg by mouth. (Patient not taking: Reported on 2025)         Allergies  Allergies   Allergen Reactions    Benazepril Other (See Comments)     Critical    Penicillin V Rash       Review of systems  A complete ROS was performed and was negative except as mentioned in HPI    Physical Exam  Vitals:    25 1235   BP: 111/61   BP Location: Right arm   Patient Position: Sitting   Cuff Size: Adult Regular   Pulse: 93   Resp: 16   Temp: 99  F (37.2  C)   TempSrc: Oral   SpO2: 97%   Weight: 86.2 kg (190 lb)       Wt Readings from Last 3 Encounters:   25 86.2 kg (190 lb)   25 85.8 kg (189 lb 1.6 oz)   25 89.7 kg (197 lb 11.2 oz)     ECO   male sitting in chair in NAD  HEET: NC/AT, EOMI w/ PERRL, anicteric sclera. MMM. No mouth sores.   Neck: supple no JVP  CV: normal S1,S2 with RRR no m/r/g  Resp: good air movement b/l. No wheezes or crackles  Abd: soft, NTND, no organomegaly or masses. BS normoactive.   Ext: WWP no edema or cyanosis  Neuro: A&Ox4, no lateralizing sx. Grossly nonfocal. Strength appears preserved.   Lymph: No cervical, supraclavicular, axillary or inguinal LAD  Skin: no concerning lesions or rashes or petechiae      Labs and Imaging    Sodium   Date Value Ref Range Status   2025 139 135 - 145 mmol/L Final    ,   Potassium   Date Value Ref Range  "Status   03/19/2025 4.6 3.4 - 5.3 mmol/L Final      Creatinine   Date Value Ref Range Status   03/19/2025 0.90 0.67 - 1.17 mg/dL Final       No results found for: \"LDH\"      Uric Acid   Date Value Ref Range Status   03/19/2025 5.0 3.4 - 7.0 mg/dL Final       WBC Count   Date Value Ref Range Status   04/16/2025 2.2 (L) 4.0 - 11.0 10e3/uL Final   ,   Hemoglobin   Date Value Ref Range Status   04/16/2025 7.4 (L) 13.3 - 17.7 g/dL Final   ],   Platelet Count   Date Value Ref Range Status   04/16/2025 111 (L) 150 - 450 10e3/uL Final   ,    MCV   Date Value Ref Range Status   04/16/2025 81 78 - 100 fL Final         EPO level was 1213 on 9/2023.        Bone marrow biopsy 7/19/2023  FINAL DIAGNOSIS   Peripheral blood, bone marrow aspirate, biopsy and clot sections ([id number]; 07/19/2023):     1. Myelodysplastic syndrome with isolated del(5q).     2. Two small monotypic B-cell populations (3% and 11% of total events) of uncertain significance, reportedly   detected by flow cytometric analysis. See comment.     COMMENT   The significance of the small monotypic B-cell populations identified by flow cytometric analysis is uncertain, as   diagnostic features of lymphoma are not seen by morphology or demonstrated by immunohistochemistry. These findings may   represent monoclonal B-cell lymphocytosis, although minimal bone marrow involvement by B-cell lymphoma cannot be   excluded. Clinical and radiographic correlation is recommended.  A lymph node or other extramedullary tissue   biopsy is suggested if lymphoma is clinically and/or radiographically suspected.   Cytogenetics 5q deletion.   NGS BCORL1 VAF 3%    Bone marrow 2/21/2024  Peripheral blood, bone marrow aspirate, touch imprint, core biopsy, and clot:     -  Pancytopenia.     -  Inadequate bone marrow aspirate, core biopsy and clot sections.      -  The flow cytometry ([id number]) of the bone marrow shows approximately 3% monotypic B-cells positive for CD5, CD19, " CD20, CD23,  and lambda, approximately 14% monotypic B-cells positive for CD5 (variable), CD19, CD20 (dim to negative), CD23 (dim), CD38 (parital) and , with no definitive light chain expression, and no increased blast population identified.   The marrow aspirate is hemodilute. The clot sections show no marrow tissue. The biopsy core shows a tiny area (<5% core sections) with a cellular (~10%) bone marrow showing tri-lineage cells including clusters of small hypolobated megakaryocytes. The immunohistochemistry on the biopsy sections appears to show increased megakaryocytes and no definitive diagnostic features of lymphoma involvement. The presence of increased small hypolobated megakaryocytes suggests persistent disease of patient's known MDS with 5q deletion. Chromosome studies show that although 20 metaphases are routinely examined, only 1 was identified and appeared normal.   The significance of the two populations of monotypic B-cells identified by flow cytometry is uncertain, as diagnostic features of lymphoma are not seen by morphology or immunohistochemistry in this inadequate marrow biopsy specimen. These findings may represent monoclonal B-cell lymphocytosis of undetermined significance or involvement of the peripheral blood by a B-cell lymphoma, although bone marrow involvement by B-cell lymphoma cannot be excluded.     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   The first clone is characterized by 46 chromosomes and a deletion of the long arm of one copy of chromosome 5. The second clone contains the same deletion of chromosome 5q with the addition of a translocation between the  short arm of chromosome 1 and the long arm of chromosome 11, and a deletion within the long arm of one copy of chromosome 7.  Taken together these findings are consistent with the reported morphologic and immunophenotpic finding of recurrent/persistent myelodysplastic syndrome ([id number]) and indicate cytogenetic progression.   The following patient's previously characterized BCORL1 pathogenic mutation was not identified in the current bone marrow aspirate.  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       MRI liver ferriscan 3/18/2025  FINDINGS:  Average liver iron concentration is 34.4 mg/g dry tissue (normal =  0.17 - 1.8)  Average liver iron concentration is 616 mmol/kg dry tissue (normal = 3  - 33)    Assessment and Plan  Mr. Willie Charles is a 64 year old male who is here for further evaluation and/or management of MDS.    Oncology:  MDS with 5q deletion  WHO Classification: MDS w low blasts and 5q deletion  Date of diagnosis: 7/19/2023  Revised IPSS score: 2.5 Low  Molecular IPSS: -0.42 Moderate low---> mODERATE HIGH  Bone Marrow Blast %: 2  Cytogenetics: 5q del---> 5Q + del 7q + t (1:11) Complex  Molecular: BCORL1 VAF 3%----> None detected except VUS in DNMT3A  Past Treatment: lenalidomide 5mg on 8/11/2023 to 9/2023 with addition of azacitidine since 8/28/2023 until 10/2023.   Current Treatment: Restarted lenalidomide 2.5mg twice a week for 3 weeks on 1 week off since 11/2023. Off since 3/12. Last cycle lenalidomide 10mg starting 8/28. Next cycle lenalidomide 10mg on 9/30. Frank 10mg on 11/4. Frank 10 on 12/30 and Frank 10mg on 1/14    Had an admission 4/18 from 4/22 and stopped lenalidomide on 4/17. Completed 3 weeks on and 1 week of of lenalidomide and restarted on 6/1 for a new cycle.     I discussed the pathophysiology and natural history of MDS with Mr. Willie Charles today. We went over the current prognostic models and  scoring systems that are used in clinical practice as well as the potential for disease evolution into acute myeloid leukemia. We went over the current FDA approved treatments for myelodysplastic syndromes and covered that the only curative option is through an allogeneic hematopoietic cell transplantation. His disease is Moderate low risk and currently BMT is not recommended.     We discussed that MDS with 5 q. responds well to lenalidomide and that he is not received adequate amount of treatment with lenalidomide.  Given that he is tolerating lenalidomide at this very low minimal dose I recommended that he increase the lenalidomide to 2.5 mg 5 times a week for 3 weeks on and then 1 week off.  If he continues to tolerate this well we can continue increasing the dose to eventually reach 10 mg 3 weeks on and 1 week off.  He can possibly be started on other agents if his anemia is not responding to lenalidomide including agents like Luspatercept.    He had a recent admission for neutropenic fever s/p antibiotics without blood cultures showing anything.  He did have diffuse ST elevations concerning for stefan/pericarditis.  Course was complicated by A-fib with RVR.  Not sure if the troponin leak was secondary to the RVR.  Though there are rare cases of arrhythmias with lenalidomide doubt if this was related to it.    He has now tolerated a full cycle of lenalidomide 2.5mg daily for 3 weeks and 1 week off.  We increased his lenalidomide to 5mg he has tolerated for 2 cycles with minimal rash that disappeared on its own without any treatment.     He tolerated the lenalidomide 10 mg for 3 weeks on and 1 week off starting 8/28. He has now finished another cycle that started on 9/30 and another cycle from 11/4   Last ANC was 700 on 11/29 and he has not had any infectious complications. We will delay his next cycle to 12/9. Bone marrow around 1/9. He marrow shows improvement in cellularity to normal levels and less dysplasia,  however not directly compared as his previous slides were not read by our pathologists. His cytogenetic studies have now finalized and they show a second clone with complex karyotype containing 5 q. deletion along with 1p, 11 q. and 7 q. Deletion.  This is highly concerning and consistent with the progression.    The complex karyotype worries me.  However the primary symptom that he is continuing to have is transfusion dependence and as we had previously discussed we will start him on imetelstat.  We will give it at least 3 to 4 months, if no response or any other signs of progression we will then switch to venetoclax and azacitidine as per [facility] trial.  I will also send a referral to one of the BMT physicians to get him established with bone marrow transplant.    He has now been started on imetelstat.  Tolerated this well.  He finally completed the MRI liver/gloria scan is consistent with significantly increased iron deposition in the liver.  Given we have obtained a new baseline.  I will start him on deferiprone.  Discussed about possible side effects with deferiprone including agranulocytosis for which we will continue to monitor his blood work twice weekly.  Continue with imetelstat for now.    I will send a referral to BMT to discuss about pros and cons of transplant.  At this point of time given his reevaluation of M-IPSS to moderate high risk given the complex karyotype this would be appropriate.  However patient lives about 4 hours from here at [place] and do want to avoid/postpone transplant for a longer period of time given transfusion dependence is his primary symptom I will give  imetelstat and maybe even luspatercept a chance.  Given he is more symptomatic we will go back to a hemoglobin threshold of 8.    He continued to have significant transfusion dependence 4 units during last month as well.  Given this and in light of new pneumonia since yesterday we will delay/forego his imetelstat infusion.  Will  plan to switch to venetoclax and azacitidine based regimen.  I will plan for repeat bone marrow biopsy after 2 cycles of this and then prepare him for transplant.  He has now established with transplant with Dr. Ngo.  We will check coverage for voriconazole versus posaconazole and start him on antifungal agent when we start this treatment.    If ANC continues to be below 0.5 for more than 7 days and he continues to have recurrent infections then we can consider prophylactic antibiotics.     #Elevated ferritin 1676------> 5372  - STOPPED Deferasirox.   - Given further elevation of ferritin we obtained a quantification of liver iron with MRI-STIR  sequencing ( ferriscan) of liver   Which shows levels of 34 mg/g.  - c/w deferiprone 2000 mg 3 times a day.  We will reevaluate iron stores in 5 to 6 months.    Plan:  - Cancel imetelstat.   - c/w twice weekly labs and as needed transfusions at local oncologist.  - If ANC trends below 500 consistently for more than 1 week can start levofloxacin as prophylaxis.   - RTC with me in  2 weeks with plan to start Vargas/aza same day.     Hematology:  Anemia/Thrombocytopenia:   Transfuse leukocyte reduced and irradiated blood products: 1 unit pRBC if hgb is 7.0-7.9, 2 units pRBCs if Hgb 6.0-6.9 g/dl, 1 unit platelets if PLT count is <20,000 or <50,000 with clinical bleeding.    Immunocompromised:  As a result of his disease and/or previous treatment. Mr. Willie Charles is immunocompromised. his last ANC is >500 and there is no need for prophylactic antibiotics at this time.     Cardiac:  Mr. Willie Charles has no history of heart disease.     Renal:  Creatinine results reviewed today. Mr. Willie Charles does not have any renal disease.    Hepatic:  Liver function tests reviewed today.    Psychosocial:  Mr. Willie Charles is coping well with his diagnosis.     All other questions and concerns were addressed and answered to Mr. Willie Charles's satisfaction.    Today, I  spent a total of 40 minutes of face-to-face and non face-to-face time with . Willie Charles. Time spent included review and discussion of diagnostic test results, patient counseling, and coordination of care.    The longitudinal plan of care for the diagnosis(es)/condition(s) as documented were addressed during this visit. Due to the added complexity in care, I will continue to support Willie in the subsequent management and with ongoing continuity of care.    Haroon Ellis MD    Division of Hematology, Oncology and Transplantation  [facility]  P: [phone]

## 2025-05-15 ENCOUNTER — TELEPHONE (OUTPATIENT)
Dept: ONCOLOGY | Facility: CLINIC | Age: 65
End: 2025-05-15
Payer: COMMERCIAL

## 2025-05-15 LAB
CMV IGG SERPL IA-ACNC: 0.99 U/ML
CMV IGG SERPL IA-ACNC: ABNORMAL

## 2025-05-15 NOTE — TELEPHONE ENCOUNTER
Prior Authorization Approval    Medication: POSACONAZOLE 100 MG PO [facility]  Authorization Effective Date: 2/14/2025  Authorization Expiration Date: 11/15/2025  Approved Dose/Quantity: 90/30 ds  Reference #: [id number]   Insurance Company: [email or web address] - Phone [phone] Fax [phone]  Expected CoPay: $ 0  CoPay Card Available:      Financial Assistance Needed: no  Which Pharmacy is filling the prescription: [facility] [place], [place] - [address] [place] [address] [phone]  Pharmacy Notified: no  Patient Notified: no

## 2025-05-15 NOTE — TELEPHONE ENCOUNTER
Prior Authorization Approval    Medication: VENCLEXTA 100 MG PO TABS  Authorization Effective Date: 2/14/2025  Authorization Expiration Date: 5/15/2026  Approved Dose/Quantity: 14/28 ds  Reference #: Key: [id number]   Insurance Company: [employer] - Phone [phone] Fax [phone]  Expected CoPay: $ 0  CoPay Card Available:      Financial Assistance Needed: no  Which Pharmacy is filling the prescription: [facility] [place], [place] - [address] [facility] [phone]  Pharmacy Notified: no  Patient Notified: yes

## 2025-05-15 NOTE — TELEPHONE ENCOUNTER
PA Initiation    Medication: VENCLEXTA 100 MG PO TABS  Insurance Company: [employer] - Phone [phone] Fax [phone]  Pharmacy Filling the Rx:    Filling Pharmacy Phone:    Filling Pharmacy Fax:    Start Date: 5/15/2025

## 2025-05-15 NOTE — TELEPHONE ENCOUNTER
Prior Authorization Approval    Medication: VORICONAZOLE 200 MG PO TABS  Authorization Effective Date: 2/14/2025  Authorization Expiration Date: 11/15/2025  Approved Dose/Quantity: 60/30 ds  Reference #: [id number]   Insurance Company: [employer] - Phone [phone] Fax [phone]  Expected CoPay: $ 0  CoPay Card Available:      Financial Assistance Needed: no  Which Pharmacy is filling the prescription: [facility] [place], [place] - [address] [place] [address] [phone]  Pharmacy Notified: no  Patient Notified: no

## 2025-05-15 NOTE — TELEPHONE ENCOUNTER
Prior Authorization Approval    Medication: POSACONAZOLE 100 MG PO [facility]  Authorization Effective Date:    Authorization Expiration Date:    Approved Dose/Quantity: 90/30  Reference #: [id number]   Insurance Company: [employer] - Phone [phone] Fax [phone]  Expected CoPay: $    CoPay Card Available:      Financial Assistance Needed: no  Which Pharmacy is filling the prescription: [facility] [place], [place] - [address] [place] [address] [phone]  Pharmacy Notified:   Patient Notified:

## 2025-05-15 NOTE — TELEPHONE ENCOUNTER
Prior Authorization Approval    Medication: VORICONAZOLE 200 MG PO TABS  Authorization Effective Date:    Authorization Expiration Date:    Approved Dose/Quantity: 60/30 ds  Reference #: [id number]   Insurance Company: [employer] - Phone [phone] Fax [phone]  Expected CoPay: $    CoPay Card Available:      Financial Assistance Needed: no  Which Pharmacy is filling the prescription: [facility] [place], [place] - [address] [place] [address] [phone]  Pharmacy Notified:   Patient Notified:

## 2025-05-19 LAB
A*LOCUS SEROLOGIC EQUIVALENT: 1
A*SEROLOGIC EQUIVALENT: 2
ABTEST METHOD: NORMAL
B*LOCUS SEROLOGIC EQUIVALENT: 8
B*SEROLOGIC EQUIVALENT: 60
BW-1: NORMAL
C*LOCUS SEROLOGIC EQUIVALENT: 10
C*SEROLOGIC EQUIVALENT: 7
DQB1*LOCUS SEROLOGIC EQUIVALENT: 2
DQB1*SEROLOGIC EQUIVALENT: 7
DRB1*LOCUS SEROLOGIC EQUIVALENT: 17
DRB1*SEROLOGIC EQUIVALENT: 4
DRB3*LOCUS SEROLOGIC EQUIVALENT: 52
DRB4*SEROLOGIC EQUIVALENT: 53
DRSSO TEST METHOD: NORMAL
HLA-A HIGH RES: NORMAL
HLA-A HIGH RES: NORMAL
HLA-B HIGH RES: NORMAL
HLA-B HIGH RES: NORMAL
HLA-CW HIGH RES: NORMAL
HLA-CW HIGH RES: NORMAL
HLA-DPA1 HIGH RES: NORMAL
HLA-DPB1 HIGH RES: NORMAL
HLA-DPB1 HIGH RES: NORMAL
HLA-DPB1 UNRESLVD ALLELES HIGH RES: NORMAL
HLA-DPB1 UNRESLVD ALLELES HIGH RES: NORMAL
HLA-DQA1 HIGH RES: NORMAL
HLA-DQA1 HIGH RES: NORMAL
HLA-DQB1 HIGH RES: NORMAL
HLA-DQB1 HIGH RES: NORMAL
HLA-DRB1 HIGH RES: NORMAL
HLA-DRB1 HIGH RES: NORMAL
HLA-DRB3 HIGH RES: NORMAL
HLA-DRB4 HIGH RES: NORMAL

## 2025-05-20 ENCOUNTER — DOCUMENTATION ONLY (OUTPATIENT)
Dept: ONCOLOGY | Facility: CLINIC | Age: 65
End: 2025-05-20
Payer: COMMERCIAL

## 2025-05-21 ENCOUNTER — TELEPHONE (OUTPATIENT)
Dept: ONCOLOGY | Facility: CLINIC | Age: 65
End: 2025-05-21
Payer: COMMERCIAL

## 2025-05-21 DIAGNOSIS — Z79.899 POLYPHARMACY: ICD-10-CM

## 2025-05-21 DIAGNOSIS — Z79.899 ENCOUNTER FOR LONG-TERM (CURRENT) USE OF HIGH-RISK MEDICATION: Primary | ICD-10-CM

## 2025-05-21 DIAGNOSIS — D46.C MDS (MYELODYSPLASTIC SYNDROME) WITH 5Q DELETION (H): ICD-10-CM

## 2025-05-21 NOTE — ORAL ONC MGMT
"Oral Chemotherapy Monitoring Program    Lab Monitoring Plan    Subjective/Objective:  Willie Charles is a 65 year old male contacted by phone for an initial visit for oral chemotherapy education.         5/20/2025    10:00 AM 5/21/2025     2:00 PM   ORAL CHEMOTHERAPY   Assessment Type Initial Work up New Teach   Diagnosis Code Myelodysplastic Syndrome Myelodysplastic Syndrome   Providers [facility]   [facility]   Drug Name Venclexta (venetoclax) Venclexta (venetoclax)   Dose 100 mg 100 mg   Current Schedule Daily Daily   Cycle Details 2 weeks on, 2 weeks off 2 weeks on, 2 weeks off       Last PHQ-2 Score on record:       4/16/2025    10:17 AM 6/28/2024     3:17 PM   PHQ-2 ( 1999 [employer])   Q1: Little interest or pleasure in doing things 0 0   Q2: Feeling down, depressed or hopeless 0 0   PHQ-2 Score 0 0       Vitals:  BP:   BP Readings from Last 1 Encounters:   05/14/25 111/61     Wt Readings from Last 1 Encounters:   05/14/25 86.2 kg (190 lb)     Estimated body surface area is 2.04 meters squared as calculated from the following:    Height as of 5/13/25: 1.74 m (5' 8.5\").    Weight as of 5/14/25: 86.2 kg (190 lb).    Labs:  _  Result Component Current Result Ref Range   Sodium 134 (L) (5/14/2025) 135 - 145 mmol/L     _  Result Component Current Result Ref Range   Potassium 3.7 (5/14/2025) 3.4 - 5.3 mmol/L     _  Result Component Current Result Ref Range   Calcium 8.2 (L) (5/14/2025) 8.8 - 10.4 mg/dL     No results found for Mag within last 30 days.     No results found for Phos within last 30 days.     _  Result Component Current Result Ref Range   Albumin 3.6 (5/14/2025) 3.5 - 5.2 g/dL     _  Result Component Current Result Ref Range   Urea Nitrogen 22.2 (5/14/2025) 8.0 - 23.0 mg/dL     _  Result Component Current Result Ref Range   Creatinine 1.20 (H) (5/14/2025) 0.67 - 1.17 mg/dL     _  Result Component Current Result Ref Range   AST 25 (5/14/2025) 0 - 45 U/L     _  Result " Component Current Result Ref Range   ALT 46 (5/14/2025) 0 - 70 U/L     _  Result Component Current Result Ref Range   Bilirubin Total 0.5 (5/14/2025) <=1.2 mg/dL     _  Result Component Current Result Ref Range   WBC Count 5.0 (5/14/2025) 4.0 - 11.0 10e3/uL     _  Result Component Current Result Ref Range   Hemoglobin 7.0 (L) (5/14/2025) 13.3 - 17.7 g/dL     _  Result Component Current Result Ref Range   Platelet Count 92 (L) (5/14/2025) 150 - 450 10e3/uL     _  Result Component Current Result Ref Range   Absolute Neutrophils 3.7 (5/14/2025) 1.6 - 8.3 10e3/uL     No results found for ANC within last 30 days.        Assessment:  Patient is appropriate to start therapy.    Plan:  Basic chemotherapy teaching was reviewed with the patient including indication, start date of therapy, dose, administration, adverse effects, missed doses, food and drug interactions, monitoring, side effect management, office contact information, and safe handling. Written materials were provided and all questions answered.    Follow-Up:  Visit w/Dr. Ellis on 5/28  Infusion pending labs starting 5/30     Ailin Meza PharmD  Hematology/Oncology Clinical Pharmacist   Oral Chemotherapy Monitoring Program  [facility]  [phone]

## 2025-05-22 DIAGNOSIS — D46.C MDS (MYELODYSPLASTIC SYNDROME) WITH 5Q DELETION (H): Primary | ICD-10-CM

## 2025-05-22 RX ORDER — HEPARIN SODIUM,PORCINE 10 UNIT/ML
5-20 VIAL (ML) INTRAVENOUS DAILY PRN
OUTPATIENT
Start: 2025-05-29

## 2025-05-22 RX ORDER — DIPHENHYDRAMINE HYDROCHLORIDE 50 MG/ML
50 INJECTION, SOLUTION INTRAMUSCULAR; INTRAVENOUS
Start: 2025-06-02

## 2025-05-22 RX ORDER — ALBUTEROL SULFATE 90 UG/1
1-2 INHALANT RESPIRATORY (INHALATION)
Start: 2025-05-30

## 2025-05-22 RX ORDER — DIPHENHYDRAMINE HYDROCHLORIDE 50 MG/ML
50 INJECTION, SOLUTION INTRAMUSCULAR; INTRAVENOUS
Start: 2025-06-04

## 2025-05-22 RX ORDER — LORAZEPAM 2 MG/ML
0.5 INJECTION INTRAMUSCULAR EVERY 4 HOURS PRN
OUTPATIENT
Start: 2025-06-01

## 2025-05-22 RX ORDER — HEPARIN SODIUM (PORCINE) LOCK FLUSH IV SOLN 100 UNIT/ML 100 UNIT/ML
5 SOLUTION INTRAVENOUS
OUTPATIENT
Start: 2025-06-04

## 2025-05-22 RX ORDER — ALBUTEROL SULFATE 90 UG/1
1-2 INHALANT RESPIRATORY (INHALATION)
Start: 2025-06-01

## 2025-05-22 RX ORDER — LORAZEPAM 2 MG/ML
0.5 INJECTION INTRAMUSCULAR EVERY 4 HOURS PRN
OUTPATIENT
Start: 2025-05-31

## 2025-05-22 RX ORDER — MEPERIDINE HYDROCHLORIDE 25 MG/ML
25 INJECTION INTRAMUSCULAR; INTRAVENOUS; SUBCUTANEOUS
OUTPATIENT
Start: 2025-06-04

## 2025-05-22 RX ORDER — HEPARIN SODIUM (PORCINE) LOCK FLUSH IV SOLN 100 UNIT/ML 100 UNIT/ML
5 SOLUTION INTRAVENOUS
OUTPATIENT
Start: 2025-05-30

## 2025-05-22 RX ORDER — METHYLPREDNISOLONE SODIUM SUCCINATE 40 MG/ML
40 INJECTION INTRAMUSCULAR; INTRAVENOUS
Start: 2025-06-03

## 2025-05-22 RX ORDER — DIPHENHYDRAMINE HYDROCHLORIDE 50 MG/ML
50 INJECTION, SOLUTION INTRAMUSCULAR; INTRAVENOUS
Start: 2025-06-01

## 2025-05-22 RX ORDER — ALBUTEROL SULFATE 0.83 MG/ML
2.5 SOLUTION RESPIRATORY (INHALATION)
OUTPATIENT
Start: 2025-06-04

## 2025-05-22 RX ORDER — MEPERIDINE HYDROCHLORIDE 25 MG/ML
25 INJECTION INTRAMUSCULAR; INTRAVENOUS; SUBCUTANEOUS
OUTPATIENT
Start: 2025-05-29

## 2025-05-22 RX ORDER — METHYLPREDNISOLONE SODIUM SUCCINATE 40 MG/ML
40 INJECTION INTRAMUSCULAR; INTRAVENOUS
Start: 2025-05-31

## 2025-05-22 RX ORDER — LORAZEPAM 2 MG/ML
0.5 INJECTION INTRAMUSCULAR EVERY 4 HOURS PRN
OUTPATIENT
Start: 2025-06-03

## 2025-05-22 RX ORDER — HEPARIN SODIUM (PORCINE) LOCK FLUSH IV SOLN 100 UNIT/ML 100 UNIT/ML
5 SOLUTION INTRAVENOUS
OUTPATIENT
Start: 2025-06-01

## 2025-05-22 RX ORDER — DIPHENHYDRAMINE HYDROCHLORIDE 50 MG/ML
25 INJECTION, SOLUTION INTRAMUSCULAR; INTRAVENOUS
Start: 2025-06-04

## 2025-05-22 RX ORDER — LORAZEPAM 2 MG/ML
0.5 INJECTION INTRAMUSCULAR EVERY 4 HOURS PRN
OUTPATIENT
Start: 2025-05-29

## 2025-05-22 RX ORDER — ALBUTEROL SULFATE 0.83 MG/ML
2.5 SOLUTION RESPIRATORY (INHALATION)
OUTPATIENT
Start: 2025-05-31

## 2025-05-22 RX ORDER — DIPHENHYDRAMINE HYDROCHLORIDE 50 MG/ML
25 INJECTION, SOLUTION INTRAMUSCULAR; INTRAVENOUS
Start: 2025-06-02

## 2025-05-22 RX ORDER — HEPARIN SODIUM,PORCINE 10 UNIT/ML
5-20 VIAL (ML) INTRAVENOUS DAILY PRN
OUTPATIENT
Start: 2025-05-30

## 2025-05-22 RX ORDER — HEPARIN SODIUM,PORCINE 10 UNIT/ML
5-20 VIAL (ML) INTRAVENOUS DAILY PRN
OUTPATIENT
Start: 2025-06-02

## 2025-05-22 RX ORDER — DIPHENHYDRAMINE HYDROCHLORIDE 50 MG/ML
25 INJECTION, SOLUTION INTRAMUSCULAR; INTRAVENOUS
Start: 2025-05-29

## 2025-05-22 RX ORDER — LORAZEPAM 2 MG/ML
0.5 INJECTION INTRAMUSCULAR EVERY 4 HOURS PRN
OUTPATIENT
Start: 2025-05-30

## 2025-05-22 RX ORDER — HEPARIN SODIUM (PORCINE) LOCK FLUSH IV SOLN 100 UNIT/ML 100 UNIT/ML
5 SOLUTION INTRAVENOUS
OUTPATIENT
Start: 2025-05-29

## 2025-05-22 RX ORDER — HEPARIN SODIUM,PORCINE 10 UNIT/ML
5-20 VIAL (ML) INTRAVENOUS DAILY PRN
OUTPATIENT
Start: 2025-06-03

## 2025-05-22 RX ORDER — ALBUTEROL SULFATE 0.83 MG/ML
2.5 SOLUTION RESPIRATORY (INHALATION)
OUTPATIENT
Start: 2025-06-01

## 2025-05-22 RX ORDER — POSACONAZOLE 100 MG/1
300 TABLET, DELAYED RELEASE ORAL EVERY MORNING
Qty: 90 TABLET | Refills: 1 | Status: SHIPPED | OUTPATIENT
Start: 2025-05-22

## 2025-05-22 RX ORDER — EPINEPHRINE 1 MG/ML
0.3 INJECTION, SOLUTION INTRAMUSCULAR; SUBCUTANEOUS EVERY 5 MIN PRN
OUTPATIENT
Start: 2025-05-30

## 2025-05-22 RX ORDER — ALBUTEROL SULFATE 0.83 MG/ML
2.5 SOLUTION RESPIRATORY (INHALATION)
OUTPATIENT
Start: 2025-06-02

## 2025-05-22 RX ORDER — DIPHENHYDRAMINE HYDROCHLORIDE 50 MG/ML
25 INJECTION, SOLUTION INTRAMUSCULAR; INTRAVENOUS
Start: 2025-05-30

## 2025-05-22 RX ORDER — METHYLPREDNISOLONE SODIUM SUCCINATE 40 MG/ML
40 INJECTION INTRAMUSCULAR; INTRAVENOUS
Start: 2025-06-01

## 2025-05-22 RX ORDER — DIPHENHYDRAMINE HYDROCHLORIDE 50 MG/ML
50 INJECTION, SOLUTION INTRAMUSCULAR; INTRAVENOUS
Start: 2025-05-29

## 2025-05-22 RX ORDER — METHYLPREDNISOLONE SODIUM SUCCINATE 40 MG/ML
40 INJECTION INTRAMUSCULAR; INTRAVENOUS
Start: 2025-06-02

## 2025-05-22 RX ORDER — ALBUTEROL SULFATE 90 UG/1
1-2 INHALANT RESPIRATORY (INHALATION)
Start: 2025-06-04

## 2025-05-22 RX ORDER — ALBUTEROL SULFATE 90 UG/1
1-2 INHALANT RESPIRATORY (INHALATION)
Start: 2025-06-03

## 2025-05-22 RX ORDER — METHYLPREDNISOLONE SODIUM SUCCINATE 40 MG/ML
40 INJECTION INTRAMUSCULAR; INTRAVENOUS
Start: 2025-05-30

## 2025-05-22 RX ORDER — ALBUTEROL SULFATE 90 UG/1
1-2 INHALANT RESPIRATORY (INHALATION)
Start: 2025-05-29

## 2025-05-22 RX ORDER — EPINEPHRINE 1 MG/ML
0.3 INJECTION, SOLUTION INTRAMUSCULAR; SUBCUTANEOUS EVERY 5 MIN PRN
OUTPATIENT
Start: 2025-06-02

## 2025-05-22 RX ORDER — METHYLPREDNISOLONE SODIUM SUCCINATE 40 MG/ML
40 INJECTION INTRAMUSCULAR; INTRAVENOUS
Start: 2025-06-04

## 2025-05-22 RX ORDER — EPINEPHRINE 1 MG/ML
0.3 INJECTION, SOLUTION INTRAMUSCULAR; SUBCUTANEOUS EVERY 5 MIN PRN
OUTPATIENT
Start: 2025-05-29

## 2025-05-22 RX ORDER — MEPERIDINE HYDROCHLORIDE 25 MG/ML
25 INJECTION INTRAMUSCULAR; INTRAVENOUS; SUBCUTANEOUS
OUTPATIENT
Start: 2025-06-02

## 2025-05-22 RX ORDER — HEPARIN SODIUM,PORCINE 10 UNIT/ML
5-20 VIAL (ML) INTRAVENOUS DAILY PRN
OUTPATIENT
Start: 2025-06-01

## 2025-05-22 RX ORDER — DIPHENHYDRAMINE HYDROCHLORIDE 50 MG/ML
50 INJECTION, SOLUTION INTRAMUSCULAR; INTRAVENOUS
Start: 2025-05-30

## 2025-05-22 RX ORDER — LORAZEPAM 2 MG/ML
0.5 INJECTION INTRAMUSCULAR EVERY 4 HOURS PRN
OUTPATIENT
Start: 2025-06-04

## 2025-05-22 RX ORDER — DIPHENHYDRAMINE HYDROCHLORIDE 50 MG/ML
25 INJECTION, SOLUTION INTRAMUSCULAR; INTRAVENOUS
Start: 2025-06-01

## 2025-05-22 RX ORDER — ALBUTEROL SULFATE 90 UG/1
1-2 INHALANT RESPIRATORY (INHALATION)
Start: 2025-06-02

## 2025-05-22 RX ORDER — MEPERIDINE HYDROCHLORIDE 25 MG/ML
25 INJECTION INTRAMUSCULAR; INTRAVENOUS; SUBCUTANEOUS
OUTPATIENT
Start: 2025-05-30

## 2025-05-22 RX ORDER — ALBUTEROL SULFATE 0.83 MG/ML
2.5 SOLUTION RESPIRATORY (INHALATION)
OUTPATIENT
Start: 2025-05-30

## 2025-05-22 RX ORDER — EPINEPHRINE 1 MG/ML
0.3 INJECTION, SOLUTION INTRAMUSCULAR; SUBCUTANEOUS EVERY 5 MIN PRN
OUTPATIENT
Start: 2025-05-31

## 2025-05-22 RX ORDER — LORAZEPAM 2 MG/ML
0.5 INJECTION INTRAMUSCULAR EVERY 4 HOURS PRN
OUTPATIENT
Start: 2025-06-02

## 2025-05-22 RX ORDER — HEPARIN SODIUM (PORCINE) LOCK FLUSH IV SOLN 100 UNIT/ML 100 UNIT/ML
5 SOLUTION INTRAVENOUS
OUTPATIENT
Start: 2025-06-02

## 2025-05-22 RX ORDER — EPINEPHRINE 1 MG/ML
0.3 INJECTION, SOLUTION INTRAMUSCULAR; SUBCUTANEOUS EVERY 5 MIN PRN
OUTPATIENT
Start: 2025-06-01

## 2025-05-22 RX ORDER — EPINEPHRINE 1 MG/ML
0.3 INJECTION, SOLUTION INTRAMUSCULAR; SUBCUTANEOUS EVERY 5 MIN PRN
OUTPATIENT
Start: 2025-06-03

## 2025-05-22 RX ORDER — HEPARIN SODIUM,PORCINE 10 UNIT/ML
5-20 VIAL (ML) INTRAVENOUS DAILY PRN
OUTPATIENT
Start: 2025-06-04

## 2025-05-22 RX ORDER — MEPERIDINE HYDROCHLORIDE 25 MG/ML
25 INJECTION INTRAMUSCULAR; INTRAVENOUS; SUBCUTANEOUS
OUTPATIENT
Start: 2025-05-31

## 2025-05-22 RX ORDER — METHYLPREDNISOLONE SODIUM SUCCINATE 40 MG/ML
40 INJECTION INTRAMUSCULAR; INTRAVENOUS
Start: 2025-05-29

## 2025-05-22 RX ORDER — MEPERIDINE HYDROCHLORIDE 25 MG/ML
25 INJECTION INTRAMUSCULAR; INTRAVENOUS; SUBCUTANEOUS
OUTPATIENT
Start: 2025-06-03

## 2025-05-22 RX ORDER — ONDANSETRON 8 MG/1
TABLET, FILM COATED ORAL
Qty: 30 TABLET | Refills: 5 | Status: SHIPPED | OUTPATIENT
Start: 2025-05-22

## 2025-05-22 RX ORDER — DIPHENHYDRAMINE HYDROCHLORIDE 50 MG/ML
25 INJECTION, SOLUTION INTRAMUSCULAR; INTRAVENOUS
Start: 2025-06-03

## 2025-05-22 RX ORDER — EPINEPHRINE 1 MG/ML
0.3 INJECTION, SOLUTION INTRAMUSCULAR; SUBCUTANEOUS EVERY 5 MIN PRN
OUTPATIENT
Start: 2025-06-04

## 2025-05-22 RX ORDER — MEPERIDINE HYDROCHLORIDE 25 MG/ML
25 INJECTION INTRAMUSCULAR; INTRAVENOUS; SUBCUTANEOUS
OUTPATIENT
Start: 2025-06-01

## 2025-05-22 RX ORDER — HEPARIN SODIUM (PORCINE) LOCK FLUSH IV SOLN 100 UNIT/ML 100 UNIT/ML
5 SOLUTION INTRAVENOUS
OUTPATIENT
Start: 2025-05-31

## 2025-05-22 RX ORDER — ALBUTEROL SULFATE 90 UG/1
1-2 INHALANT RESPIRATORY (INHALATION)
Start: 2025-05-31

## 2025-05-22 RX ORDER — DIPHENHYDRAMINE HYDROCHLORIDE 50 MG/ML
50 INJECTION, SOLUTION INTRAMUSCULAR; INTRAVENOUS
Start: 2025-05-31

## 2025-05-22 RX ORDER — DIPHENHYDRAMINE HYDROCHLORIDE 50 MG/ML
50 INJECTION, SOLUTION INTRAMUSCULAR; INTRAVENOUS
Start: 2025-06-03

## 2025-05-22 RX ORDER — ALBUTEROL SULFATE 0.83 MG/ML
2.5 SOLUTION RESPIRATORY (INHALATION)
OUTPATIENT
Start: 2025-06-03

## 2025-05-22 RX ORDER — HEPARIN SODIUM (PORCINE) LOCK FLUSH IV SOLN 100 UNIT/ML 100 UNIT/ML
5 SOLUTION INTRAVENOUS
OUTPATIENT
Start: 2025-06-03

## 2025-05-22 RX ORDER — ALBUTEROL SULFATE 0.83 MG/ML
2.5 SOLUTION RESPIRATORY (INHALATION)
OUTPATIENT
Start: 2025-05-29

## 2025-05-22 RX ORDER — DIPHENHYDRAMINE HYDROCHLORIDE 50 MG/ML
25 INJECTION, SOLUTION INTRAMUSCULAR; INTRAVENOUS
Start: 2025-05-31

## 2025-05-22 RX ORDER — HEPARIN SODIUM,PORCINE 10 UNIT/ML
5-20 VIAL (ML) INTRAVENOUS DAILY PRN
OUTPATIENT
Start: 2025-05-31

## 2025-05-28 ENCOUNTER — APPOINTMENT (OUTPATIENT)
Dept: LAB | Facility: CLINIC | Age: 65
End: 2025-05-28
Attending: STUDENT IN AN ORGANIZED HEALTH CARE EDUCATION/TRAINING PROGRAM
Payer: COMMERCIAL

## 2025-05-28 ENCOUNTER — PATIENT OUTREACH (OUTPATIENT)
Dept: ONCOLOGY | Facility: CLINIC | Age: 65
End: 2025-05-28

## 2025-05-28 ENCOUNTER — ONCOLOGY VISIT (OUTPATIENT)
Dept: ONCOLOGY | Facility: CLINIC | Age: 65
End: 2025-05-28
Attending: STUDENT IN AN ORGANIZED HEALTH CARE EDUCATION/TRAINING PROGRAM
Payer: COMMERCIAL

## 2025-05-28 VITALS
WEIGHT: 186.8 LBS | RESPIRATION RATE: 16 BRPM | BODY MASS INDEX: 27.99 KG/M2 | SYSTOLIC BLOOD PRESSURE: 135 MMHG | TEMPERATURE: 97.5 F | HEART RATE: 76 BPM | OXYGEN SATURATION: 98 % | DIASTOLIC BLOOD PRESSURE: 76 MMHG

## 2025-05-28 DIAGNOSIS — D46.C MDS (MYELODYSPLASTIC SYNDROME) WITH 5Q DELETION (H): Primary | ICD-10-CM

## 2025-05-28 DIAGNOSIS — Z79.899 ENCOUNTER FOR LONG-TERM (CURRENT) USE OF HIGH-RISK MEDICATION: ICD-10-CM

## 2025-05-28 LAB
ALBUMIN SERPL BCG-MCNC: 3.8 G/DL (ref 3.5–5.2)
ALP SERPL-CCNC: 93 U/L (ref 40–150)
ALT SERPL W P-5'-P-CCNC: 51 U/L (ref 0–70)
ANION GAP SERPL CALCULATED.3IONS-SCNC: 8 MMOL/L (ref 7–15)
AST SERPL W P-5'-P-CCNC: 26 U/L (ref 0–45)
BASOPHILS # BLD AUTO: 0 10E3/UL (ref 0–0.2)
BASOPHILS NFR BLD AUTO: 1 %
BILIRUB SERPL-MCNC: 0.2 MG/DL
BUN SERPL-MCNC: 16.3 MG/DL (ref 8–23)
CALCIUM SERPL-MCNC: 9 MG/DL (ref 8.8–10.4)
CHLORIDE SERPL-SCNC: 106 MMOL/L (ref 98–107)
CREAT SERPL-MCNC: 0.87 MG/DL (ref 0.67–1.17)
EGFRCR SERPLBLD CKD-EPI 2021: >90 ML/MIN/1.73M2
EOSINOPHIL # BLD AUTO: 0 10E3/UL (ref 0–0.7)
EOSINOPHIL NFR BLD AUTO: 1 %
ERYTHROCYTE [DISTWIDTH] IN BLOOD BY AUTOMATED COUNT: 14.3 % (ref 10–15)
GLUCOSE SERPL-MCNC: 150 MG/DL (ref 70–99)
HCO3 SERPL-SCNC: 24 MMOL/L (ref 22–29)
HCT VFR BLD AUTO: 23.9 % (ref 40–53)
HGB BLD-MCNC: 7.9 G/DL (ref 13.3–17.7)
IMM GRANULOCYTES # BLD: 0 10E3/UL
IMM GRANULOCYTES NFR BLD: 1 %
LYMPHOCYTES # BLD AUTO: 1.2 10E3/UL (ref 0.8–5.3)
LYMPHOCYTES NFR BLD AUTO: 46 %
MCH RBC QN AUTO: 27.1 PG (ref 26.5–33)
MCHC RBC AUTO-ENTMCNC: 33.1 G/DL (ref 31.5–36.5)
MCV RBC AUTO: 82 FL (ref 78–100)
MONOCYTES # BLD AUTO: 0.2 10E3/UL (ref 0–1.3)
MONOCYTES NFR BLD AUTO: 6 %
NEUTROPHILS # BLD AUTO: 1.1 10E3/UL (ref 1.6–8.3)
NEUTROPHILS NFR BLD AUTO: 45 %
NRBC # BLD AUTO: 0 10E3/UL
NRBC BLD AUTO-RTO: 0 /100
PHOSPHATE SERPL-MCNC: 2.8 MG/DL (ref 2.5–4.5)
PLAT MORPH BLD: NORMAL
PLATELET # BLD AUTO: 97 10E3/UL (ref 150–450)
POTASSIUM SERPL-SCNC: 4.6 MMOL/L (ref 3.4–5.3)
PROT SERPL-MCNC: 7 G/DL (ref 6.4–8.3)
RBC # BLD AUTO: 2.91 10E6/UL (ref 4.4–5.9)
RBC MORPH BLD: NORMAL
SODIUM SERPL-SCNC: 138 MMOL/L (ref 135–145)
URATE SERPL-MCNC: 4.6 MG/DL (ref 3.4–7)
VIT B12 SERPL-MCNC: 1178 PG/ML (ref 232–1245)
WBC # BLD AUTO: 2.5 10E3/UL (ref 4–11)

## 2025-05-28 PROCEDURE — 84100 ASSAY OF PHOSPHORUS: CPT | Performed by: STUDENT IN AN ORGANIZED HEALTH CARE EDUCATION/TRAINING PROGRAM

## 2025-05-28 PROCEDURE — 36415 COLL VENOUS BLD VENIPUNCTURE: CPT | Performed by: STUDENT IN AN ORGANIZED HEALTH CARE EDUCATION/TRAINING PROGRAM

## 2025-05-28 PROCEDURE — 85018 HEMOGLOBIN: CPT | Performed by: STUDENT IN AN ORGANIZED HEALTH CARE EDUCATION/TRAINING PROGRAM

## 2025-05-28 PROCEDURE — 82565 ASSAY OF CREATININE: CPT | Performed by: STUDENT IN AN ORGANIZED HEALTH CARE EDUCATION/TRAINING PROGRAM

## 2025-05-28 PROCEDURE — 82607 VITAMIN B-12: CPT | Performed by: STUDENT IN AN ORGANIZED HEALTH CARE EDUCATION/TRAINING PROGRAM

## 2025-05-28 PROCEDURE — 84550 ASSAY OF BLOOD/URIC ACID: CPT | Performed by: STUDENT IN AN ORGANIZED HEALTH CARE EDUCATION/TRAINING PROGRAM

## 2025-05-28 PROCEDURE — G0463 HOSPITAL OUTPT CLINIC VISIT: HCPCS | Performed by: STUDENT IN AN ORGANIZED HEALTH CARE EDUCATION/TRAINING PROGRAM

## 2025-05-28 RX ORDER — LEVOFLOXACIN 500 MG/1
500 TABLET, FILM COATED ORAL DAILY
Qty: 30 TABLET | Refills: 0 | Status: SHIPPED | OUTPATIENT
Start: 2025-05-28

## 2025-05-28 RX ORDER — ACYCLOVIR 400 MG/1
400 TABLET ORAL 2 TIMES DAILY
Qty: 60 TABLET | Refills: 2 | Status: SHIPPED | OUTPATIENT
Start: 2025-05-28

## 2025-05-28 NOTE — PROGRESS NOTES
[facility]: Cancer Care                                                                                          Writer met with Jimi and his spouse Monica after visit with Dr. Ellis. Reviewed medications and went through medication list with patient and spouse. Monica would like to fill a pill box for Jimi. Printed medication list and reviewed tips on filling pill boxes at home.   Reviewed chemotherapy pill safety with Jimi and Monica, and to keep their chemotherapy pills in original packaging. They verbalized understanding.       Signature:  Abby Reyes RN

## 2025-05-28 NOTE — NURSING NOTE
"Oncology Rooming Note    May 28, 2025 9:59 AM   Willie Charles is a 65 year old male who presents for:    Chief Complaint   Patient presents with    Oncology Clinic Visit     RTN MDS      Initial Vitals: /76 (BP Location: Right arm, Patient Position: Sitting, Cuff Size: Adult Regular)   Pulse 76   Temp 97.5  F (36.4  C) (Oral)   Resp 16   Wt 84.7 kg (186 lb 12.8 oz)   SpO2 98%   BMI 27.99 kg/m   Estimated body mass index is 27.99 kg/m  as calculated from the following:    Height as of 25: 1.74 m (5' 8.5\").    Weight as of this encounter: 84.7 kg (186 lb 12.8 oz). Body surface area is 2.02 meters squared.  No Pain (0) Comment: Data Unavailable   No LMP for male patient.  Allergies reviewed: Yes  Medications reviewed: Yes    Medications: MEDICATION REFILLS NEEDED TODAY. Provider was notified.  Pharmacy name entered into [employer]:    [id number] - [place], [place] - [facility] - [place], [place] - [address]    Frailty Screening:   Is the patient here for a new oncology consult visit in cancer care? 2. No    PHQ9:  Did this patient require a PHQ9?: No      Clinical concerns: Discuss when to start Venclexta, would like to meet with pharmacist to go over meds what to take when to take.  Would like a refill of Acyclovir 400, cyanocobalamin vitamin b12- 1000 mcg, states the prescription  unable to  in [place] needs new script.       Blaine Felipe             "

## 2025-05-28 NOTE — LETTER
5/28/2025      Willie Charles  [address]  [facility] [address]      Dear Colleague,    Thank you for referring your patient, Willie Charles, to the [facility]. Please see a copy of my visit note below.    [facility]  HEMATOLOGY & ONCOLOGY  FOLLOW UP VISIT    PATIENT NAME: Willie Charles          MRN # [id number]  YOB: 1960  DATE OF VISIT:  May 28, 2025            REFERRING PROVIDER:   Mr. Willie Charles was seen at the request of Arely for further evaluation and/or management.     History of Presenting Illness  Mr. Willie Charles is a pleasant 63 year old male with a past medical history significant for hyperthyroisidism s/p radioactive iodine, HTN  and MDS dx in 7/2023 after he had fatigue for several months found to have bicytopenia with WBC 3.3 and hemoglobin 7.0 and platelets 308 that led to a bone marrow biopsy on 7/19/2023 that showed hypocellular marrow with 10 to 20% cellularity and 2% blasts with megakaryocytic hyperplasia with dysplasia.  Cytogenetics showed 5 q. deletion and NGS showed BCORL1 with a VAF of 3%.  He was started on lenalidomide 5 mg daily on 8/11/2023.  His anemia worsened after a few days and he was then started on concurrent azacitidine in addition to Revlimid on 8/28/2023.  As per records his counts trended down by 9/2023 and his Revlimid was discontinued.  He was continued on 2 more cycles of azacitidine until October 2023.  He did see Dr. Squires at [place] in 11/2023 and was recommended to restart Revlimid starting at 2.5 mg dosing every other day for 3 weeks on and 1 week off.  He was started on 2.5 mg 2 times a week  and has been tolerating that for the last 3 months.He has continued to require about 2-4 prbcs in a month.  Denies any fevers or chills or ongoing bleeding from anywhere.    He has now established with the [facility] clinic and has now been increased to lenalidomide 5mg daily for 3 weeks on 1 week off.      Subjective  Patient is here with wife. He was found to have an E. coli UTI on 5/23 and started on levofloxacin after initial IV antibiotics.  He has been taking the deferipirone.  he is slowly starting to feel better.  Denies any bleeding from anywhere including BRBPR/Melena.     Past Medical History  No past medical history on file.    Family History  No family history on file.    Social History  Smoking: Never  Alcohol use: occasionally drinks alcohol  Status:   Children/grandchildren: Did not ask.   Occupation: Housing  , .     [employer]  Current Outpatient Medications  Current Outpatient Medications   Medication Sig Dispense Refill     acetaminophen (TYLENOL) 500 MG tablet Take 500-1,000 mg by mouth every 4 hours as needed for mild pain.       acyclovir (ZOVIRAX) 400 MG tablet Take 400 mg by mouth 2 times daily       aspirin 81 MG EC tablet Take 81 mg by mouth daily       buPROPion (WELLBUTRIN SR) 150 MG 12 hr tablet Take 150 mg by mouth daily.       cyanocobalamin (VITAMIN B-12) 1000 MCG tablet Take 1,000 mcg by mouth daily       deferiprone (FERRIPROX) 1000 MG TABS tablet TAKE 2 TABLETS (2,000 MG) BY MOUTH THREE TIMES A DAY. 180 tablet 0     diltiazem ER COATED BEADS (CARDIZEM CD/CARTIA XT) 180 MG 24 hr capsule Take 180 mg by mouth daily       folic acid (FOLVITE) 1 MG tablet Take 1 tablet (1 mg) by mouth daily. 30 tablet 5     levothyroxine (SYNTHROID/LEVOTHROID) 125 MCG tablet Take 125 mcg by mouth daily.       loratadine (CLARITIN) 10 MG tablet Take 10 mg by mouth daily       metFORMIN (GLUCOPHAGE XR) 500 MG 24 hr tablet Take 500 mg by mouth daily (with dinner)       metoprolol succinate ER (TOPROL XL) 100 MG 24 hr tablet  (Patient not taking: Reported on 5/14/2025)       metoprolol succinate ER (TOPROL XL) 50 MG 24 hr tablet Take 100 mg by mouth daily.       montelukast (SINGULAIR) 10 MG tablet Take 10 mg by mouth at bedtime       ondansetron (ZOFRAN) 8 MG tablet Take 1 tab  (8 mg) by mouth prior to azacitidine, then 1 tab every 8 hours as needed for nausea. 30 tablet 5     polyethylene glycol (MIRALAX) 17 g packet Take 1 packet by mouth daily as needed for constipation. (Patient not taking: Reported on 2025)       posaconazole (NOXAFIL) 100 MG DR tablet Take 3 tablets (300 mg) by mouth every morning. 90 tablet 1     REVLIMID 10 MG CAPS capsule Take 10 mg by mouth daily (Patient not taking: Reported on 2025)       sildenafil (REVATIO) 20 MG tablet Take 1 tab on empty stomach one hour prior to sexual activity; may increase to a max of 5 tabs (Patient not taking: Reported on 2025)       tamsulosin (FLOMAX) 0.4 MG capsule Take 0.4 mg by mouth. (Patient not taking: Reported on 2025)       timolol, PF, (TIMOPTIC OCUDOSE) 0.5 % ophthalmic solution Place 1 drop into both eyes daily.       [START ON 2025] venetoclax (VENCLEXTA) 100 MG tablet Take 1 tablet (100 mg) by mouth daily 14 tablet 0       Allergies  Allergies   Allergen Reactions     Benazepril Other (See Comments)     Critical     Penicillin V Rash       Review of systems  A complete ROS was performed and was negative except as mentioned in HPI    Physical Exam  Vitals:    25 0936   BP: 135/76   BP Location: Right arm   Patient Position: Sitting   Cuff Size: Adult Regular   Pulse: 76   Resp: 16   Temp: 97.5  F (36.4  C)   TempSrc: Oral   SpO2: 98%   Weight: 84.7 kg (186 lb 12.8 oz)       Wt Readings from Last 3 Encounters:   25 84.7 kg (186 lb 12.8 oz)   25 86.2 kg (190 lb)   25 85.8 kg (189 lb 1.6 oz)     ECO   male sitting in chair in NAD  HEET: NC/AT, EOMI w/ PERRL, anicteric sclera. MMM. No mouth sores.   Neck: supple no JVP  CV: normal S1,S2 with RRR no m/r/g  Resp: good air movement b/l. No wheezes or crackles  Abd: soft, NTND, no organomegaly or masses. BS normoactive.   Ext: WWP no edema or cyanosis  Neuro: A&Ox4, no lateralizing sx. Grossly nonfocal. Strength appears  "preserved.   Lymph: No cervical, supraclavicular, axillary or inguinal LAD  Skin: no concerning lesions or rashes or petechiae      Labs and Imaging    Sodium   Date Value Ref Range Status   05/14/2025 134 (L) 135 - 145 mmol/L Final    ,   Potassium   Date Value Ref Range Status   05/14/2025 3.7 3.4 - 5.3 mmol/L Final      Creatinine   Date Value Ref Range Status   05/14/2025 1.20 (H) 0.67 - 1.17 mg/dL Final       No results found for: \"LDH\"      Uric Acid   Date Value Ref Range Status   05/14/2025 5.1 3.4 - 7.0 mg/dL Final       WBC Count   Date Value Ref Range Status   05/28/2025 2.5 (L) 4.0 - 11.0 10e3/uL Preliminary   ,   Hemoglobin   Date Value Ref Range Status   05/28/2025 7.9 (L) 13.3 - 17.7 g/dL Preliminary   ],   Platelet Count   Date Value Ref Range Status   05/28/2025 97 (L) 150 - 450 10e3/uL Preliminary   ,    MCV   Date Value Ref Range Status   05/28/2025 82 78 - 100 fL Preliminary         EPO level was 1213 on 9/2023.        Bone marrow biopsy 7/19/2023  FINAL DIAGNOSIS   Peripheral blood, bone marrow aspirate, biopsy and clot sections ([id number]; 07/19/2023):     1. Myelodysplastic syndrome with isolated del(5q).     2. Two small monotypic B-cell populations (3% and 11% of total events) of uncertain significance, reportedly   detected by flow cytometric analysis. See comment.     COMMENT   The significance of the small monotypic B-cell populations identified by flow cytometric analysis is uncertain, as   diagnostic features of lymphoma are not seen by morphology or demonstrated by immunohistochemistry. These findings may   represent monoclonal B-cell lymphocytosis, although minimal bone marrow involvement by B-cell lymphoma cannot be   excluded. Clinical and radiographic correlation is recommended.  A lymph node or other extramedullary tissue   biopsy is suggested if lymphoma is clinically and/or radiographically suspected.   Cytogenetics 5q deletion.   NGS BCORL1 VAF 3%    Bone marrow " 2/21/2024  Peripheral blood, bone marrow aspirate, touch imprint, core biopsy, and clot:     -  Pancytopenia.     -  Inadequate bone marrow aspirate, core biopsy and clot sections.      -  The flow cytometry ([id number]) of the bone marrow shows approximately 3% monotypic B-cells positive for CD5, CD19, CD20, CD23,  and lambda, approximately 14% monotypic B-cells positive for CD5 (variable), CD19, CD20 (dim to negative), CD23 (dim), CD38 (parital) and , with no definitive light chain expression, and no increased blast population identified.   The marrow aspirate is hemodilute. The clot sections show no marrow tissue. The biopsy core shows a tiny area (<5% core sections) with a cellular (~10%) bone marrow showing tri-lineage cells including clusters of small hypolobated megakaryocytes. The immunohistochemistry on the biopsy sections appears to show increased megakaryocytes and no definitive diagnostic features of lymphoma involvement. The presence of increased small hypolobated megakaryocytes suggests persistent disease of patient's known MDS with 5q deletion. Chromosome studies show that although 20 metaphases are routinely examined, only 1 was identified and appeared normal.   The significance of the two populations of monotypic B-cells identified by flow cytometry is uncertain, as diagnostic features of lymphoma are not seen by morphology or immunohistochemistry in this inadequate marrow biopsy specimen. These findings may represent monoclonal B-cell lymphocytosis of undetermined significance or involvement of the peripheral blood by a B-cell lymphoma, although bone marrow involvement by B-cell lymphoma cannot be excluded.     Bone marrow biopsy 1/9/2025  - Recurrent/persistent myelodysplastic syndrome with the following features:  - Normocellular marrow for age (variable; overall 30-40%) with trilineage hematopoiesis, megakaryocytic hyperplasia with dysmegakaryopoiesis, and 3% blasts  RESULTS:      ABNORMAL  - Loss of EGR1 (63.5%)  Two related clones were identified in this specimen.   The first clone is characterized by 46 chromosomes and a deletion of the long arm of one copy of chromosome 5. The second clone contains the same deletion of chromosome 5q with the addition of a translocation between the short arm of chromosome 1 and the long arm of chromosome 11, and a deletion within the long arm of one copy of chromosome 7.  Taken together these findings are consistent with the reported morphologic and immunophenotpic finding of recurrent/persistent myelodysplastic syndrome ([id number]) and indicate cytogenetic progression.   The following patient's previously characterized BCORL1 pathogenic mutation was not identified in the current bone marrow aspirate.  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       MRI liver ferriscan 3/18/2025  FINDINGS:  Average liver iron concentration is 34.4 mg/g dry tissue (normal =  0.17 - 1.8)  Average liver iron concentration is 616 mmol/kg dry tissue (normal = 3  - 33)    Assessment and Plan  Mr. Willie Charles is a 64 year old male who is here for further evaluation and/or management of MDS.    Oncology:  MDS with 5q deletion  WHO Classification: MDS w low blasts and 5q deletion  Date of diagnosis: 7/19/2023  Revised IPSS score: 2.5 Low  Molecular IPSS: -0.42 Moderate low---> mODERATE HIGH  Bone Marrow Blast %: 2  Cytogenetics: 5q del---> 5Q + del 7q + t (1:11) Complex  Molecular: BCORL1 VAF 3%----> None detected except VUS in DNMT3A  Past Treatment: lenalidomide 5mg on 8/11/2023 to 9/2023 with addition of azacitidine since 8/28/2023 until 10/2023.   Current Treatment: Restarted lenalidomide 2.5mg twice a week for 3 weeks on 1 week off since 11/2023. Off since 3/12. Last cycle lenalidomide 10mg starting 8/28. Next cycle lenalidomide 10mg on 9/30. Frank 10mg on 11/4. Frank 10 on 12/30 and Frank 10mg on  1/14    Had an admission 4/18 from 4/22 and stopped lenalidomide on 4/17. Completed 3 weeks on and 1 week of of lenalidomide and restarted on 6/1 for a new cycle.     I discussed the pathophysiology and natural history of MDS with Mr. Willie Charles today. We went over the current prognostic models and scoring systems that are used in clinical practice as well as the potential for disease evolution into acute myeloid leukemia. We went over the current FDA approved treatments for myelodysplastic syndromes and covered that the only curative option is through an allogeneic hematopoietic cell transplantation. His disease is Moderate low risk and currently BMT is not recommended.     We discussed that MDS with 5 q. responds well to lenalidomide and that he is not received adequate amount of treatment with lenalidomide.  Given that he is tolerating lenalidomide at this very low minimal dose I recommended that he increase the lenalidomide to 2.5 mg 5 times a week for 3 weeks on and then 1 week off.  If he continues to tolerate this well we can continue increasing the dose to eventually reach 10 mg 3 weeks on and 1 week off.  He can possibly be started on other agents if his anemia is not responding to lenalidomide including agents like Luspatercept.    He had a recent admission for neutropenic fever s/p antibiotics without blood cultures showing anything.  He did have diffuse ST elevations concerning for stefan/pericarditis.  Course was complicated by A-fib with RVR.  Not sure if the troponin leak was secondary to the RVR.  Though there are rare cases of arrhythmias with lenalidomide doubt if this was related to it.    He has now tolerated a full cycle of lenalidomide 2.5mg daily for 3 weeks and 1 week off.  We increased his lenalidomide to 5mg he has tolerated for 2 cycles with minimal rash that disappeared on its own without any treatment.     He tolerated the lenalidomide 10 mg for 3 weeks on and 1 week off starting  8/28. He has now finished another cycle that started on 9/30 and another cycle from 11/4   Last ANC was 700 on 11/29 and he has not had any infectious complications. We will delay his next cycle to 12/9. Bone marrow around 1/9. He marrow shows improvement in cellularity to normal levels and less dysplasia, however not directly compared as his previous slides were not read by our pathologists. His cytogenetic studies have now finalized and they show a second clone with complex karyotype containing 5 q. deletion along with 1p, 11 q. and 7 q. Deletion.  This is highly concerning and consistent with the progression.    The complex karyotype worries me.  However the primary symptom that he is continuing to have is transfusion dependence and as we had previously discussed we will start him on imetelstat.  We will give it at least 3 to 4 months, if no response or any other signs of progression we will then switch to venetoclax and azacitidine as per [place] trial.  I will also send a referral to one of the BMT physicians to get him established with bone marrow transplant.    He has now been started on imetelstat.  Tolerated this well.  He finally completed the MRI liver/gloria scan is consistent with significantly increased iron deposition in the liver.  Given we have obtained a new baseline.  I will start him on deferiprone.  Discussed about possible side effects with deferiprone including agranulocytosis for which we will continue to monitor his blood work twice weekly.  Continue with imetelstat for now.    I will send a referral to BMT to discuss about pros and cons of transplant.  At this point of time given his reevaluation of M-IPSS to moderate high risk given the complex karyotype this would be appropriate.  However patient lives about 4 hours from here at [place] and do want to avoid/postpone transplant for a longer period of time given transfusion dependence is his primary symptom I will give  imetelstat and maybe  even luspatercept a chance.  Given he is more symptomatic we will go back to a hemoglobin threshold of 8.    He continued to have significant transfusion dependence 4 units during last month as well.  Given this and in light of new pneumonia since yesterday we will delay/forego his imetelstat infusion.  Will plan to switch to venetoclax and azacitidine based regimen.  I will plan for repeat bone marrow biopsy after 2 cycles of this and then prepare him for transplant.  He has now established with transplant with Dr. Ngo.  We will check coverage for voriconazole versus posaconazole and start him on antifungal agent when we start this treatment.    He had yet another infection with an E. coli UTI.  It is a pansensitive E. coli and he has been started on levofloxacin since 5/23 after initial IV antibiotics.  Will plan to start him on venetoclax and azacitidine as per [place] trial starting tomorrow/day after if waitlist is confirmed.  He will also take posaconazole along with the venetoclax.  Once UTI treatment is completed he will continue with levofloxacin thereafter.    #Elevated ferritin 1676------> 5372  - STOPPED Deferasirox.   - Given further elevation of ferritin we obtained a quantification of liver iron with MRI-STIR  sequencing ( ferriscan) of liver   Which shows levels of 34 mg/g.  - Will hold deferiprone while he is on posaconazole due to concern for accumulated liver issues.    Plan:  - Start venetoclax and azacitidine.  - c/w twice weekly labs and as needed transfusions at local oncologist.  - Continue with levofloxacin and start posaconazole with venetoclax.  - He will stop deferiprone now.    Hematology:  Anemia/Thrombocytopenia:   Transfuse leukocyte reduced and irradiated blood products: 1 unit pRBC if hgb is 7.0-7.9, 2 units pRBCs if Hgb 6.0-6.9 g/dl, 1 unit platelets if PLT count is <20,000 or <50,000 with clinical bleeding.    Immunocompromised:  As a result of his disease and/or previous  treatment. Mr. Willie Charles is immunocompromised. his last ANC is >500 and there is no need for prophylactic antibiotics at this time.     Cardiac:  Mr. Willie Charles has no history of heart disease.     Renal:  Creatinine results reviewed today. Mr. Willie Charles does not have any renal disease.    Hepatic:  Liver function tests reviewed today.    Psychosocial:  Mr. Willie Charles is coping well with his diagnosis.     All other questions and concerns were addressed and answered to Mr. Willie Charles's satisfaction.    Today, I spent a total of 40 minutes of face-to-face and non face-to-face time with Mr. Willie Charles. Time spent included review and discussion of diagnostic test results, patient counseling, and coordination of care.    The longitudinal plan of care for the diagnosis(es)/condition(s) as documented were addressed during this visit. Due to the added complexity in care, I will continue to support Willie in the subsequent management and with ongoing continuity of care.    Haroon Ellis MD    Division of Hematology, Oncology and Transplantation  [facility]  P: [phone]    Again, thank you for allowing me to participate in the care of your patient.        Sincerely,        Haroon Ellis MD    Electronically signed

## 2025-05-28 NOTE — PROGRESS NOTES
[facility]  HEMATOLOGY & ONCOLOGY  FOLLOW UP VISIT    PATIENT NAME: Willie Charles          MRN # [id number]  YOB: 1960  DATE OF VISIT:  May 28, 2025            REFERRING PROVIDER:   Mr. Willie Charles was seen at the request of Arely for further evaluation and/or management.     History of Presenting Illness  Mr. Willie Charles is a pleasant 63 year old male with a past medical history significant for hyperthyroisidism s/p radioactive iodine, HTN  and MDS dx in 7/2023 after he had fatigue for several months found to have bicytopenia with WBC 3.3 and hemoglobin 7.0 and platelets 308 that led to a bone marrow biopsy on 7/19/2023 that showed hypocellular marrow with 10 to 20% cellularity and 2% blasts with megakaryocytic hyperplasia with dysplasia.  Cytogenetics showed 5 q. deletion and NGS showed BCORL1 with a VAF of 3%.  He was started on lenalidomide 5 mg daily on 8/11/2023.  His anemia worsened after a few days and he was then started on concurrent azacitidine in addition to Revlimid on 8/28/2023.  As per records his counts trended down by 9/2023 and his Revlimid was discontinued.  He was continued on 2 more cycles of azacitidine until October 2023.  He did see Dr. Squires at [place] in 11/2023 and was recommended to restart Revlimid starting at 2.5 mg dosing every other day for 3 weeks on and 1 week off.  He was started on 2.5 mg 2 times a week  and has been tolerating that for the last 3 months.He has continued to require about 2-4 prbcs in a month.  Denies any fevers or chills or ongoing bleeding from anywhere.    He has now established with the [facility] and has now been increased to lenalidomide 5mg daily for 3 weeks on 1 week off.     Subjective  Patient is here with wife. He was found to have an E. coli UTI on 5/23 and started on levofloxacin after initial IV antibiotics.  He has been taking the deferipirone.  he is slowly starting to feel better.  Denies any bleeding  from anywhere including BRBPR/Melena.     Past Medical History  No past medical history on file.    Family History  No family history on file.    Social History  Smoking: Never  Alcohol use: occasionally drinks alcohol  Status:   Children/grandchildren: Did not ask.   Occupation: Housing  , .     [employer]  Current Outpatient Medications  Current Outpatient Medications   Medication Sig Dispense Refill    acetaminophen (TYLENOL) 500 MG tablet Take 500-1,000 mg by mouth every 4 hours as needed for mild pain.      acyclovir (ZOVIRAX) 400 MG tablet Take 400 mg by mouth 2 times daily      aspirin 81 MG EC tablet Take 81 mg by mouth daily      buPROPion (WELLBUTRIN SR) 150 MG 12 hr tablet Take 150 mg by mouth daily.      cyanocobalamin (VITAMIN B-12) 1000 MCG tablet Take 1,000 mcg by mouth daily      deferiprone (FERRIPROX) 1000 MG TABS tablet TAKE 2 TABLETS (2,000 MG) BY MOUTH THREE TIMES A DAY. 180 tablet 0    diltiazem ER COATED BEADS (CARDIZEM CD/CARTIA XT) 180 MG 24 hr capsule Take 180 mg by mouth daily      folic acid (FOLVITE) 1 MG tablet Take 1 tablet (1 mg) by mouth daily. 30 tablet 5    levothyroxine (SYNTHROID/LEVOTHROID) 125 MCG tablet Take 125 mcg by mouth daily.      loratadine (CLARITIN) 10 MG tablet Take 10 mg by mouth daily      metFORMIN (GLUCOPHAGE XR) 500 MG 24 hr tablet Take 500 mg by mouth daily (with dinner)      metoprolol succinate ER (TOPROL XL) 100 MG 24 hr tablet  (Patient not taking: Reported on 5/14/2025)      metoprolol succinate ER (TOPROL XL) 50 MG 24 hr tablet Take 100 mg by mouth daily.      montelukast (SINGULAIR) 10 MG tablet Take 10 mg by mouth at bedtime      ondansetron (ZOFRAN) 8 MG tablet Take 1 tab (8 mg) by mouth prior to azacitidine, then 1 tab every 8 hours as needed for nausea. 30 tablet 5    polyethylene glycol (MIRALAX) 17 g packet Take 1 packet by mouth daily as needed for constipation. (Patient not taking: Reported on 5/14/2025)       posaconazole (NOXAFIL) 100 MG DR tablet Take 3 tablets (300 mg) by mouth every morning. 90 tablet 1    REVLIMID 10 MG CAPS capsule Take 10 mg by mouth daily (Patient not taking: Reported on 2025)      sildenafil (REVATIO) 20 MG tablet Take 1 tab on empty stomach one hour prior to sexual activity; may increase to a max of 5 tabs (Patient not taking: Reported on 2025)      tamsulosin (FLOMAX) 0.4 MG capsule Take 0.4 mg by mouth. (Patient not taking: Reported on 2025)      timolol, PF, (TIMOPTIC OCUDOSE) 0.5 % ophthalmic solution Place 1 drop into both eyes daily.      [START ON 2025] venetoclax (VENCLEXTA) 100 MG tablet Take 1 tablet (100 mg) by mouth daily 14 tablet 0       Allergies  Allergies   Allergen Reactions    Benazepril Other (See Comments)     Critical    Penicillin V Rash       Review of systems  A complete ROS was performed and was negative except as mentioned in HPI    Physical Exam  Vitals:    25 0936   BP: 135/76   BP Location: Right arm   Patient Position: Sitting   Cuff Size: Adult Regular   Pulse: 76   Resp: 16   Temp: 97.5  F (36.4  C)   TempSrc: Oral   SpO2: 98%   Weight: 84.7 kg (186 lb 12.8 oz)       Wt Readings from Last 3 Encounters:   25 84.7 kg (186 lb 12.8 oz)   25 86.2 kg (190 lb)   25 85.8 kg (189 lb 1.6 oz)     ECO   male sitting in chair in NAD  HEET: NC/AT, EOMI w/ PERRL, anicteric sclera. MMM. No mouth sores.   Neck: supple no JVP  CV: normal S1,S2 with RRR no m/r/g  Resp: good air movement b/l. No wheezes or crackles  Abd: soft, NTND, no organomegaly or masses. BS normoactive.   Ext: WWP no edema or cyanosis  Neuro: A&Ox4, no lateralizing sx. Grossly nonfocal. Strength appears preserved.   Lymph: No cervical, supraclavicular, axillary or inguinal LAD  Skin: no concerning lesions or rashes or petechiae      Labs and Imaging    Sodium   Date Value Ref Range Status   2025 134 (L) 135 - 145 mmol/L Final    ,   Potassium   Date  "Value Ref Range Status   05/14/2025 3.7 3.4 - 5.3 mmol/L Final      Creatinine   Date Value Ref Range Status   05/14/2025 1.20 (H) 0.67 - 1.17 mg/dL Final       No results found for: \"LDH\"      Uric Acid   Date Value Ref Range Status   05/14/2025 5.1 3.4 - 7.0 mg/dL Final       WBC Count   Date Value Ref Range Status   05/28/2025 2.5 (L) 4.0 - 11.0 10e3/uL Preliminary   ,   Hemoglobin   Date Value Ref Range Status   05/28/2025 7.9 (L) 13.3 - 17.7 g/dL Preliminary   ],   Platelet Count   Date Value Ref Range Status   05/28/2025 97 (L) 150 - 450 10e3/uL Preliminary   ,    MCV   Date Value Ref Range Status   05/28/2025 82 78 - 100 fL Preliminary         EPO level was 1213 on 9/2023.        Bone marrow biopsy 7/19/2023  FINAL DIAGNOSIS   Peripheral blood, bone marrow aspirate, biopsy and clot sections ([id number]; 07/19/2023):     1. Myelodysplastic syndrome with isolated del(5q).     2. Two small monotypic B-cell populations (3% and 11% of total events) of uncertain significance, reportedly   detected by flow cytometric analysis. See comment.     COMMENT   The significance of the small monotypic B-cell populations identified by flow cytometric analysis is uncertain, as   diagnostic features of lymphoma are not seen by morphology or demonstrated by immunohistochemistry. These findings may   represent monoclonal B-cell lymphocytosis, although minimal bone marrow involvement by B-cell lymphoma cannot be   excluded. Clinical and radiographic correlation is recommended.  A lymph node or other extramedullary tissue   biopsy is suggested if lymphoma is clinically and/or radiographically suspected.   Cytogenetics 5q deletion.   NGS BCORL1 VAF 3%    Bone marrow 2/21/2024  Peripheral blood, bone marrow aspirate, touch imprint, core biopsy, and clot:     -  Pancytopenia.     -  Inadequate bone marrow aspirate, core biopsy and clot sections.      -  The flow cytometry ([id number]) of the bone marrow shows approximately 3% " monotypic B-cells positive for CD5, CD19, CD20, CD23,  and lambda, approximately 14% monotypic B-cells positive for CD5 (variable), CD19, CD20 (dim to negative), CD23 (dim), CD38 (parital) and , with no definitive light chain expression, and no increased blast population identified.   The marrow aspirate is hemodilute. The clot sections show no marrow tissue. The biopsy core shows a tiny area (<5% core sections) with a cellular (~10%) bone marrow showing tri-lineage cells including clusters of small hypolobated megakaryocytes. The immunohistochemistry on the biopsy sections appears to show increased megakaryocytes and no definitive diagnostic features of lymphoma involvement. The presence of increased small hypolobated megakaryocytes suggests persistent disease of patient's known MDS with 5q deletion. Chromosome studies show that although 20 metaphases are routinely examined, only 1 was identified and appeared normal.   The significance of the two populations of monotypic B-cells identified by flow cytometry is uncertain, as diagnostic features of lymphoma are not seen by morphology or immunohistochemistry in this inadequate marrow biopsy specimen. These findings may represent monoclonal B-cell lymphocytosis of undetermined significance or involvement of the peripheral blood by a B-cell lymphoma, although bone marrow involvement by B-cell lymphoma cannot be excluded.     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   The first clone is characterized by 46 chromosomes and a deletion of the long arm of one copy of chromosome 5. The second clone contains the same deletion of chromosome 5q with  the addition of a translocation between the short arm of chromosome 1 and the long arm of chromosome 11, and a deletion within the long arm of one copy of chromosome 7.  Taken together these findings are consistent with the reported morphologic and immunophenotpic finding of recurrent/persistent myelodysplastic syndrome ([id number]) and indicate cytogenetic progression.   The following patient's previously characterized BCORL1 pathogenic mutation was not identified in the current bone marrow aspirate.  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       MRI liver ferriscan 3/18/2025  FINDINGS:  Average liver iron concentration is 34.4 mg/g dry tissue (normal =  0.17 - 1.8)  Average liver iron concentration is 616 mmol/kg dry tissue (normal = 3  - 33)    Assessment and Plan  Mr. Willie Charles is a 64 year old male who is here for further evaluation and/or management of MDS.    Oncology:  MDS with 5q deletion  WHO Classification: MDS w low blasts and 5q deletion  Date of diagnosis: 7/19/2023  Revised IPSS score: 2.5 Low  Molecular IPSS: -0.42 Moderate low---> mODERATE HIGH  Bone Marrow Blast %: 2  Cytogenetics: 5q del---> 5Q + del 7q + t (1:11) Complex  Molecular: BCORL1 VAF 3%----> None detected except VUS in DNMT3A  Past Treatment: lenalidomide 5mg on 8/11/2023 to 9/2023 with addition of azacitidine since 8/28/2023 until 10/2023.   Current Treatment: Restarted lenalidomide 2.5mg twice a week for 3 weeks on 1 week off since 11/2023. Off since 3/12. Last cycle lenalidomide 10mg starting 8/28. Next cycle lenalidomide 10mg on 9/30. Frank 10mg on 11/4. Frank 10 on 12/30 and Frank 10mg on 1/14    Had an admission 4/18 from 4/22 and stopped lenalidomide on 4/17. Completed 3 weeks on and 1 week of of lenalidomide and restarted on 6/1 for a new cycle.     I discussed the pathophysiology and natural history of MDS with Mr. Willie Charles today. We went  over the current prognostic models and scoring systems that are used in clinical practice as well as the potential for disease evolution into acute myeloid leukemia. We went over the current FDA approved treatments for myelodysplastic syndromes and covered that the only curative option is through an allogeneic hematopoietic cell transplantation. His disease is Moderate low risk and currently BMT is not recommended.     We discussed that MDS with 5 q. responds well to lenalidomide and that he is not received adequate amount of treatment with lenalidomide.  Given that he is tolerating lenalidomide at this very low minimal dose I recommended that he increase the lenalidomide to 2.5 mg 5 times a week for 3 weeks on and then 1 week off.  If he continues to tolerate this well we can continue increasing the dose to eventually reach 10 mg 3 weeks on and 1 week off.  He can possibly be started on other agents if his anemia is not responding to lenalidomide including agents like Luspatercept.    He had a recent admission for neutropenic fever s/p antibiotics without blood cultures showing anything.  He did have diffuse ST elevations concerning for stefan/pericarditis.  Course was complicated by A-fib with RVR.  Not sure if the troponin leak was secondary to the RVR.  Though there are rare cases of arrhythmias with lenalidomide doubt if this was related to it.    He has now tolerated a full cycle of lenalidomide 2.5mg daily for 3 weeks and 1 week off.  We increased his lenalidomide to 5mg he has tolerated for 2 cycles with minimal rash that disappeared on its own without any treatment.     He tolerated the lenalidomide 10 mg for 3 weeks on and 1 week off starting 8/28. He has now finished another cycle that started on 9/30 and another cycle from 11/4   Last ANC was 700 on 11/29 and he has not had any infectious complications. We will delay his next cycle to 12/9. Bone marrow around 1/9. He marrow shows improvement in cellularity  to normal levels and less dysplasia, however not directly compared as his previous slides were not read by our pathologists. His cytogenetic studies have now finalized and they show a second clone with complex karyotype containing 5 q. deletion along with 1p, 11 q. and 7 q. Deletion.  This is highly concerning and consistent with the progression.    The complex karyotype worries me.  However the primary symptom that he is continuing to have is transfusion dependence and as we had previously discussed we will start him on imetelstat.  We will give it at least 3 to 4 months, if no response or any other signs of progression we will then switch to venetoclax and azacitidine as per [employer] trial.  I will also send a referral to one of the BMT physicians to get him established with bone marrow transplant.    He has now been started on imetelstat.  Tolerated this well.  He finally completed the MRI liver/gloria scan is consistent with significantly increased iron deposition in the liver.  Given we have obtained a new baseline.  I will start him on deferiprone.  Discussed about possible side effects with deferiprone including agranulocytosis for which we will continue to monitor his blood work twice weekly.  Continue with imetelstat for now.    I will send a referral to BMT to discuss about pros and cons of transplant.  At this point of time given his reevaluation of M-IPSS to moderate high risk given the complex karyotype this would be appropriate.  However patient lives about 4 hours from here at [place] and do want to avoid/postpone transplant for a longer period of time given transfusion dependence is his primary symptom I will give  imetelstat and maybe even luspatercept a chance.  Given he is more symptomatic we will go back to a hemoglobin threshold of 8.    He continued to have significant transfusion dependence 4 units during last month as well.  Given this and in light of new pneumonia since yesterday we will  delay/forego his imetelstat infusion.  Will plan to switch to venetoclax and azacitidine based regimen.  I will plan for repeat bone marrow biopsy after 2 cycles of this and then prepare him for transplant.  He has now established with transplant with Dr. Ngo.  We will check coverage for voriconazole versus posaconazole and start him on antifungal agent when we start this treatment.    He had yet another infection with an E. coli UTI.  It is a pansensitive E. coli and he has been started on levofloxacin since 5/23 after initial IV antibiotics.  Will plan to start him on venetoclax and azacitidine as per [employer]woa trial starting tomorrow/day after if waitlist is confirmed.  He will also take posaconazole along with the venetoclax.  Once UTI treatment is completed he will continue with levofloxacin thereafter.    #Elevated ferritin 1676------> 5372  - STOPPED Deferasirox.   - Given further elevation of ferritin we obtained a quantification of liver iron with MRI-STIR  sequencing ( ferriscan) of liver   Which shows levels of 34 mg/g.  - Will hold deferiprone while he is on posaconazole due to concern for accumulated liver issues.    Plan:  - Start venetoclax and azacitidine.  - c/w twice weekly labs and as needed transfusions at local oncologist.  - Continue with levofloxacin and start posaconazole with venetoclax.  - He will stop deferiprone now.    Hematology:  Anemia/Thrombocytopenia:   Transfuse leukocyte reduced and irradiated blood products: 1 unit pRBC if hgb is 7.0-7.9, 2 units pRBCs if Hgb 6.0-6.9 g/dl, 1 unit platelets if PLT count is <20,000 or <50,000 with clinical bleeding.    Immunocompromised:  As a result of his disease and/or previous treatment. Mr. Willie Charles is immunocompromised. his last ANC is >500 and there is no need for prophylactic antibiotics at this time.     Cardiac:  Mr. Willie Charles has no history of heart disease.     Renal:  Creatinine results reviewed today. Mr. Willie MACE  Melvin does not have any renal disease.    Hepatic:  Liver function tests reviewed today.    Psychosocial:  Mr. Willie Charles is coping well with his diagnosis.     All other questions and concerns were addressed and answered to . Willie Charles's satisfaction.    Today, I spent a total of 40 minutes of face-to-face and non face-to-face time with Mr. Willie Charles. Time spent included review and discussion of diagnostic test results, patient counseling, and coordination of care.    The longitudinal plan of care for the diagnosis(es)/condition(s) as documented were addressed during this visit. Due to the added complexity in care, I will continue to support Willie in the subsequent management and with ongoing continuity of care.    Haroon Ellis MD    Division of Hematology, Oncology and Transplantation  [facility]  P: [phone]

## 2025-05-29 ENCOUNTER — PATIENT OUTREACH (OUTPATIENT)
Dept: ONCOLOGY | Facility: CLINIC | Age: 65
End: 2025-05-29
Payer: COMMERCIAL

## 2025-05-29 NOTE — PROGRESS NOTES
[facility]: Cancer Care                                                                                          Coordinated care with [facility] infusion and Dr. Ellis. Will plan on delaying chemotherapy/Azacitidine start to Monday 6/2.   Discussed change with spouse Monica. Also confirmed with Monica that Jimi should not start his chemotherapy pill as discussed tomorrow and should wait until he starts his chemotherapy injections of Azacitidine on Monday. She verbalized understanding, repeated instructions and appointment days correctly back to writer and denied questions.     Signature:  Abby Reyes RN

## 2025-06-02 ENCOUNTER — INFUSION THERAPY VISIT (OUTPATIENT)
Dept: INFUSION THERAPY | Facility: HOSPITAL | Age: 65
End: 2025-06-02
Attending: INTERNAL MEDICINE
Payer: COMMERCIAL

## 2025-06-02 ENCOUNTER — TELEPHONE (OUTPATIENT)
Dept: ONCOLOGY | Facility: CLINIC | Age: 65
End: 2025-06-02

## 2025-06-02 ENCOUNTER — LAB (OUTPATIENT)
Dept: INFUSION THERAPY | Facility: HOSPITAL | Age: 65
End: 2025-06-02
Attending: STUDENT IN AN ORGANIZED HEALTH CARE EDUCATION/TRAINING PROGRAM
Payer: COMMERCIAL

## 2025-06-02 VITALS
HEART RATE: 70 BPM | OXYGEN SATURATION: 95 % | DIASTOLIC BLOOD PRESSURE: 56 MMHG | TEMPERATURE: 97.5 F | RESPIRATION RATE: 16 BRPM | SYSTOLIC BLOOD PRESSURE: 106 MMHG

## 2025-06-02 DIAGNOSIS — D46.C MDS (MYELODYSPLASTIC SYNDROME) WITH 5Q DELETION (H): Primary | ICD-10-CM

## 2025-06-02 DIAGNOSIS — D46.C MDS (MYELODYSPLASTIC SYNDROME) WITH 5Q DELETION (H): ICD-10-CM

## 2025-06-02 DIAGNOSIS — Z79.899 ENCOUNTER FOR LONG-TERM (CURRENT) USE OF HIGH-RISK MEDICATION: ICD-10-CM

## 2025-06-02 LAB
ABO + RH BLD: NORMAL
ALBUMIN SERPL BCG-MCNC: 3.9 G/DL (ref 3.5–5.2)
ALP SERPL-CCNC: 103 U/L (ref 40–150)
ALT SERPL W P-5'-P-CCNC: 143 U/L (ref 0–70)
ANION GAP SERPL CALCULATED.3IONS-SCNC: 7 MMOL/L (ref 7–15)
AST SERPL W P-5'-P-CCNC: 71 U/L (ref 0–45)
BASOPHILS # BLD AUTO: 0 10E3/UL (ref 0–0.2)
BASOPHILS NFR BLD AUTO: 0 %
BILIRUB SERPL-MCNC: 0.3 MG/DL
BLD GP AB SCN SERPL QL: NEGATIVE
BLD PROD TYP BPU: NORMAL
BLOOD COMPONENT TYPE: NORMAL
BUN SERPL-MCNC: 20.1 MG/DL (ref 8–23)
CALCIUM SERPL-MCNC: 9.2 MG/DL (ref 8.8–10.4)
CHLORIDE SERPL-SCNC: 104 MMOL/L (ref 98–107)
CODING SYSTEM: NORMAL
CREAT SERPL-MCNC: 1.16 MG/DL (ref 0.67–1.17)
CROSSMATCH: NORMAL
EGFRCR SERPLBLD CKD-EPI 2021: 69 ML/MIN/1.73M2
EOSINOPHIL # BLD AUTO: 0 10E3/UL (ref 0–0.7)
EOSINOPHIL NFR BLD AUTO: 0 %
ERYTHROCYTE [DISTWIDTH] IN BLOOD BY AUTOMATED COUNT: 14.5 % (ref 10–15)
GLUCOSE SERPL-MCNC: 188 MG/DL (ref 70–99)
HCO3 SERPL-SCNC: 27 MMOL/L (ref 22–29)
HCT VFR BLD AUTO: 22.8 % (ref 40–53)
HGB BLD-MCNC: 7.5 G/DL (ref 13.3–17.7)
IMM GRANULOCYTES # BLD: 0 10E3/UL
IMM GRANULOCYTES NFR BLD: 2 %
ISSUE DATE AND TIME: NORMAL
LYMPHOCYTES # BLD AUTO: 1.2 10E3/UL (ref 0.8–5.3)
LYMPHOCYTES NFR BLD AUTO: 44 %
MCH RBC QN AUTO: 26.7 PG (ref 26.5–33)
MCHC RBC AUTO-ENTMCNC: 32.9 G/DL (ref 31.5–36.5)
MCV RBC AUTO: 81 FL (ref 78–100)
MONOCYTES # BLD AUTO: 0.2 10E3/UL (ref 0–1.3)
MONOCYTES NFR BLD AUTO: 6 %
NEUTROPHILS # BLD AUTO: 1.3 10E3/UL (ref 1.6–8.3)
NEUTROPHILS NFR BLD AUTO: 48 %
NRBC # BLD AUTO: 0 10E3/UL
NRBC BLD AUTO-RTO: 0 /100
PHOSPHATE SERPL-MCNC: 2.9 MG/DL (ref 2.5–4.5)
PLATELET # BLD AUTO: 158 10E3/UL (ref 150–450)
POTASSIUM SERPL-SCNC: 4.6 MMOL/L (ref 3.4–5.3)
PROT SERPL-MCNC: 7.1 G/DL (ref 6.4–8.3)
RBC # BLD AUTO: 2.81 10E6/UL (ref 4.4–5.9)
SODIUM SERPL-SCNC: 138 MMOL/L (ref 135–145)
SPECIMEN EXP DATE BLD: NORMAL
UNIT ABO/RH: NORMAL
UNIT NUMBER: NORMAL
UNIT STATUS: NORMAL
UNIT TYPE ISBT: 9500
URATE SERPL-MCNC: 5.2 MG/DL (ref 3.4–7)
WBC # BLD AUTO: 2.7 10E3/UL (ref 4–11)

## 2025-06-02 PROCEDURE — 258N000003 HC RX IP 258 OP 636: Performed by: STUDENT IN AN ORGANIZED HEALTH CARE EDUCATION/TRAINING PROGRAM

## 2025-06-02 PROCEDURE — 86901 BLOOD TYPING SEROLOGIC RH(D): CPT | Performed by: STUDENT IN AN ORGANIZED HEALTH CARE EDUCATION/TRAINING PROGRAM

## 2025-06-02 PROCEDURE — 86923 COMPATIBILITY TEST ELECTRIC: CPT | Performed by: INTERNAL MEDICINE

## 2025-06-02 PROCEDURE — 36415 COLL VENOUS BLD VENIPUNCTURE: CPT

## 2025-06-02 PROCEDURE — P9040 RBC LEUKOREDUCED IRRADIATED: HCPCS | Performed by: INTERNAL MEDICINE

## 2025-06-02 PROCEDURE — 250N000013 HC RX MED GY IP 250 OP 250 PS 637: Performed by: STUDENT IN AN ORGANIZED HEALTH CARE EDUCATION/TRAINING PROGRAM

## 2025-06-02 PROCEDURE — 85025 COMPLETE CBC W/AUTO DIFF WBC: CPT

## 2025-06-02 PROCEDURE — 84100 ASSAY OF PHOSPHORUS: CPT

## 2025-06-02 PROCEDURE — 36430 TRANSFUSION BLD/BLD COMPNT: CPT

## 2025-06-02 PROCEDURE — 82247 BILIRUBIN TOTAL: CPT

## 2025-06-02 PROCEDURE — 250N000011 HC RX IP 250 OP 636: Performed by: STUDENT IN AN ORGANIZED HEALTH CARE EDUCATION/TRAINING PROGRAM

## 2025-06-02 PROCEDURE — 96401 CHEMO ANTI-NEOPL SQ/IM: CPT

## 2025-06-02 PROCEDURE — 84550 ASSAY OF BLOOD/URIC ACID: CPT

## 2025-06-02 RX ORDER — MEPERIDINE HYDROCHLORIDE 25 MG/ML
25 INJECTION INTRAMUSCULAR; INTRAVENOUS; SUBCUTANEOUS
Status: DISCONTINUED | OUTPATIENT
Start: 2025-06-02 | End: 2025-06-02 | Stop reason: HOSPADM

## 2025-06-02 RX ORDER — EPINEPHRINE 1 MG/ML
0.3 INJECTION, SOLUTION INTRAMUSCULAR; SUBCUTANEOUS EVERY 5 MIN PRN
Status: CANCELLED | OUTPATIENT
Start: 2025-06-02

## 2025-06-02 RX ORDER — DIPHENHYDRAMINE HYDROCHLORIDE 50 MG/ML
25 INJECTION, SOLUTION INTRAMUSCULAR; INTRAVENOUS
Status: DISCONTINUED | OUTPATIENT
Start: 2025-06-02 | End: 2025-06-02 | Stop reason: HOSPADM

## 2025-06-02 RX ORDER — HEPARIN SODIUM,PORCINE 10 UNIT/ML
5-20 VIAL (ML) INTRAVENOUS DAILY PRN
Status: CANCELLED | OUTPATIENT
Start: 2025-06-02

## 2025-06-02 RX ORDER — METHYLPREDNISOLONE SODIUM SUCCINATE 40 MG/ML
40 INJECTION INTRAMUSCULAR; INTRAVENOUS
Status: DISCONTINUED | OUTPATIENT
Start: 2025-06-02 | End: 2025-06-02 | Stop reason: HOSPADM

## 2025-06-02 RX ORDER — HEPARIN SODIUM (PORCINE) LOCK FLUSH IV SOLN 100 UNIT/ML 100 UNIT/ML
5 SOLUTION INTRAVENOUS
Status: CANCELLED | OUTPATIENT
Start: 2025-06-02

## 2025-06-02 RX ORDER — HEPARIN SODIUM (PORCINE) LOCK FLUSH IV SOLN 100 UNIT/ML 100 UNIT/ML
5 SOLUTION INTRAVENOUS
Status: DISCONTINUED | OUTPATIENT
Start: 2025-06-02 | End: 2025-06-02 | Stop reason: HOSPADM

## 2025-06-02 RX ORDER — DIPHENHYDRAMINE HCL 50 MG
50 CAPSULE ORAL EVERY 6 HOURS PRN
Status: DISPENSED | OUTPATIENT
Start: 2025-06-02

## 2025-06-02 RX ORDER — ALBUTEROL SULFATE 0.83 MG/ML
2.5 SOLUTION RESPIRATORY (INHALATION)
Status: DISCONTINUED | OUTPATIENT
Start: 2025-06-02 | End: 2025-06-02 | Stop reason: HOSPADM

## 2025-06-02 RX ORDER — EPINEPHRINE 1 MG/ML
0.3 INJECTION, SOLUTION INTRAMUSCULAR; SUBCUTANEOUS EVERY 5 MIN PRN
Status: DISCONTINUED | OUTPATIENT
Start: 2025-06-02 | End: 2025-06-02 | Stop reason: HOSPADM

## 2025-06-02 RX ORDER — DIPHENHYDRAMINE HYDROCHLORIDE 50 MG/ML
50 INJECTION, SOLUTION INTRAMUSCULAR; INTRAVENOUS
Status: CANCELLED
Start: 2025-06-02

## 2025-06-02 RX ORDER — ALBUTEROL SULFATE 90 UG/1
1-2 INHALANT RESPIRATORY (INHALATION)
Status: DISCONTINUED | OUTPATIENT
Start: 2025-06-02 | End: 2025-06-02 | Stop reason: HOSPADM

## 2025-06-02 RX ORDER — ACETAMINOPHEN 325 MG/1
325 TABLET ORAL EVERY 4 HOURS PRN
Status: DISPENSED | OUTPATIENT
Start: 2025-06-02

## 2025-06-02 RX ORDER — DIPHENHYDRAMINE HYDROCHLORIDE 50 MG/ML
50 INJECTION, SOLUTION INTRAMUSCULAR; INTRAVENOUS
Status: DISCONTINUED | OUTPATIENT
Start: 2025-06-02 | End: 2025-06-02 | Stop reason: HOSPADM

## 2025-06-02 RX ADMIN — ACETAMINOPHEN 650 MG: 325 TABLET ORAL at 13:33

## 2025-06-02 RX ADMIN — Medication 5 ML: at 15:46

## 2025-06-02 RX ADMIN — DIPHENHYDRAMINE HYDROCHLORIDE 50 MG: 50 CAPSULE ORAL at 13:33

## 2025-06-02 RX ADMIN — AZACITIDINE 150 MG: 100 INJECTION, POWDER, LYOPHILIZED, FOR SOLUTION INTRAVENOUS; SUBCUTANEOUS at 13:18

## 2025-06-02 RX ADMIN — SODIUM CHLORIDE 250 ML: 0.9 INJECTION, SOLUTION INTRAVENOUS at 13:30

## 2025-06-02 NOTE — PROGRESS NOTES
Infusion Nursing Note:  Willie Charles presents today for D1 C1 Vidaza injection.    Patient seen by provider today: No   present during visit today: Not Applicable.    Note: Jimi arrived ambulatory in stable condition. Printed Vidaza information given to and reviewed with patient. He verbalized understanding plan of care. His labs meet parameters for one unit of blood today in addition to the Vidaza injection. Vidaza administered in right upper quadrant abdomen and tolerated well with no bleeding noted. No premeds ordered for blood transfusion. Contacted Dr. Ellis regarding history of patient needing premeds for blood transfusions. One time orders received and plan will be updated by Dr. Ellis today. Premedicated with tylenol and benadryl prior to blood transfusion. One unit packed red blood cells transfused and tolerated with no signs of reaction. Vital signs stable. Jimi is scheduled to return for his next Vidaza injection tomorrow.      Intravenous Access:  Labs drawn without difficulty.  Implanted Port.    Treatment Conditions:  Lab Results   Component Value Date    HGB 7.5 (L) 06/02/2025    WBC 2.7 (L) 06/02/2025    ANEU 1.3 (L) 06/02/2025     06/02/2025        Lab Results   Component Value Date     06/02/2025    POTASSIUM 4.6 06/02/2025    CR 1.16 06/02/2025    JACK 9.2 06/02/2025    BILITOTAL 0.3 06/02/2025    ALBUMIN 3.9 06/02/2025     (H) 06/02/2025    AST 71 (H) 06/02/2025       Results reviewed, labs MET treatment parameters, ok to proceed with treatment.      Post Infusion Assessment:  Patient tolerated infusion without incident.  Patient tolerated injection without incident.  Blood return noted pre and post infusion.  Site patent and intact, free from redness, edema or discomfort.  Access discontinued per protocol.       Discharge Plan:   Patient and/or family verbalized understanding of discharge instructions and all questions answered.  Copy of AVS reviewed with  patient and/or family.  Patient will return 6/2/25 for next appointment.  Patient discharged in stable condition accompanied by: wife.  Departure Mode: Ambulatory.      Hailee Velasco RN

## 2025-06-02 NOTE — ORAL ONC MGMT
This writer gave Monica a call to check in on Willie's venetoclax therapy. Monica noted that Willie was currently getting his azacitadine injection during the call and has NOT started the venetoclax. Monica noted that he will start this evening at dinnertime. This writer appreciate the information and will schedule a call-back in 1 week to check how Willie has been tolerating the medication.     Ailin Meza, PharmD  Hematology/Oncology Clinical Pharmacist   Oral Chemotherapy Monitoring Program  [facility]  [phone]    
pt is intubated/3 = unable to understand or speak (not related to language barrier)

## 2025-06-03 ENCOUNTER — LAB (OUTPATIENT)
Dept: INFUSION THERAPY | Facility: HOSPITAL | Age: 65
End: 2025-06-03
Attending: INTERNAL MEDICINE
Payer: COMMERCIAL

## 2025-06-03 VITALS
HEART RATE: 77 BPM | OXYGEN SATURATION: 98 % | SYSTOLIC BLOOD PRESSURE: 135 MMHG | TEMPERATURE: 98.3 F | RESPIRATION RATE: 16 BRPM | DIASTOLIC BLOOD PRESSURE: 66 MMHG

## 2025-06-03 DIAGNOSIS — D46.C MDS (MYELODYSPLASTIC SYNDROME) WITH 5Q DELETION (H): Primary | ICD-10-CM

## 2025-06-03 LAB
ALBUMIN SERPL BCG-MCNC: 3.9 G/DL (ref 3.5–5.2)
ALP SERPL-CCNC: 103 U/L (ref 40–150)
ALT SERPL W P-5'-P-CCNC: 156 U/L (ref 0–70)
ANION GAP SERPL CALCULATED.3IONS-SCNC: 5 MMOL/L (ref 7–15)
AST SERPL W P-5'-P-CCNC: 72 U/L (ref 0–45)
BASOPHILS # BLD AUTO: 0 10E3/UL (ref 0–0.2)
BASOPHILS NFR BLD AUTO: 0 %
BILIRUB SERPL-MCNC: 0.4 MG/DL
BUN SERPL-MCNC: 19.7 MG/DL (ref 8–23)
CALCIUM SERPL-MCNC: 9 MG/DL (ref 8.8–10.4)
CHLORIDE SERPL-SCNC: 103 MMOL/L (ref 98–107)
CREAT SERPL-MCNC: 1.01 MG/DL (ref 0.67–1.17)
EGFRCR SERPLBLD CKD-EPI 2021: 82 ML/MIN/1.73M2
EOSINOPHIL # BLD AUTO: 0 10E3/UL (ref 0–0.7)
EOSINOPHIL NFR BLD AUTO: 0 %
ERYTHROCYTE [DISTWIDTH] IN BLOOD BY AUTOMATED COUNT: 14.2 % (ref 10–15)
GLUCOSE SERPL-MCNC: 142 MG/DL (ref 70–99)
HCO3 SERPL-SCNC: 29 MMOL/L (ref 22–29)
HCT VFR BLD AUTO: 24.8 % (ref 40–53)
HGB BLD-MCNC: 8.2 G/DL (ref 13.3–17.7)
IMM GRANULOCYTES # BLD: 0 10E3/UL
IMM GRANULOCYTES NFR BLD: 1 %
LYMPHOCYTES # BLD AUTO: 0.7 10E3/UL (ref 0.8–5.3)
LYMPHOCYTES NFR BLD AUTO: 29 %
MCH RBC QN AUTO: 27.3 PG (ref 26.5–33)
MCHC RBC AUTO-ENTMCNC: 33.1 G/DL (ref 31.5–36.5)
MCV RBC AUTO: 83 FL (ref 78–100)
MONOCYTES # BLD AUTO: 0.2 10E3/UL (ref 0–1.3)
MONOCYTES NFR BLD AUTO: 8 %
NEUTROPHILS # BLD AUTO: 1.5 10E3/UL (ref 1.6–8.3)
NEUTROPHILS NFR BLD AUTO: 61 %
NRBC # BLD AUTO: 0 10E3/UL
NRBC BLD AUTO-RTO: 0 /100
PHOSPHATE SERPL-MCNC: 3.4 MG/DL (ref 2.5–4.5)
PLATELET # BLD AUTO: 149 10E3/UL (ref 150–450)
POTASSIUM SERPL-SCNC: 4.5 MMOL/L (ref 3.4–5.3)
PROT SERPL-MCNC: 7.1 G/DL (ref 6.4–8.3)
RBC # BLD AUTO: 3 10E6/UL (ref 4.4–5.9)
SODIUM SERPL-SCNC: 137 MMOL/L (ref 135–145)
URATE SERPL-MCNC: 4.8 MG/DL (ref 3.4–7)
WBC # BLD AUTO: 2.4 10E3/UL (ref 4–11)

## 2025-06-03 PROCEDURE — 84550 ASSAY OF BLOOD/URIC ACID: CPT | Performed by: STUDENT IN AN ORGANIZED HEALTH CARE EDUCATION/TRAINING PROGRAM

## 2025-06-03 PROCEDURE — 85025 COMPLETE CBC W/AUTO DIFF WBC: CPT | Performed by: STUDENT IN AN ORGANIZED HEALTH CARE EDUCATION/TRAINING PROGRAM

## 2025-06-03 PROCEDURE — 80053 COMPREHEN METABOLIC PANEL: CPT | Performed by: STUDENT IN AN ORGANIZED HEALTH CARE EDUCATION/TRAINING PROGRAM

## 2025-06-03 PROCEDURE — 250N000011 HC RX IP 250 OP 636: Performed by: STUDENT IN AN ORGANIZED HEALTH CARE EDUCATION/TRAINING PROGRAM

## 2025-06-03 PROCEDURE — 36415 COLL VENOUS BLD VENIPUNCTURE: CPT | Performed by: STUDENT IN AN ORGANIZED HEALTH CARE EDUCATION/TRAINING PROGRAM

## 2025-06-03 PROCEDURE — 84100 ASSAY OF PHOSPHORUS: CPT | Performed by: STUDENT IN AN ORGANIZED HEALTH CARE EDUCATION/TRAINING PROGRAM

## 2025-06-03 PROCEDURE — 96401 CHEMO ANTI-NEOPL SQ/IM: CPT

## 2025-06-03 RX ADMIN — AZACITIDINE 150 MG: 100 INJECTION, POWDER, LYOPHILIZED, FOR SOLUTION INTRAVENOUS; SUBCUTANEOUS at 13:27

## 2025-06-03 NOTE — PROGRESS NOTES
Infusion Nursing Note:  Willie Charles presents today for D2 C1 Vidaza.    Patient seen by provider today: No   present during visit today: Not Applicable.    Note: Jimi arrived ambulatory in stable condition. His right side of abdomen is slightly reddened from the Vidaza injections given yesterday. He denies any adverse effects of his first Vidaza dose. Denies any new concerns since yesterday. Labs were drawn on second floor as ordered. He does not meet parameters for blood transfusion today. Vidaza injected to left lower quadrant. Band aids applied. Jimi will return tomorrow for D3 C1.      Intravenous Access:  Labs drawn without difficulty.    Treatment Conditions:  Lab Results   Component Value Date    HGB 8.2 (L) 06/03/2025    WBC 2.4 (L) 06/03/2025    ANEU 1.5 (L) 06/03/2025     (L) 06/03/2025        Lab Results   Component Value Date     06/03/2025    POTASSIUM 4.5 06/03/2025    CR 1.01 06/03/2025    JACK 9.0 06/03/2025    BILITOTAL 0.4 06/03/2025    ALBUMIN 3.9 06/03/2025     (H) 06/03/2025    AST 72 (H) 06/03/2025       Results reviewed, labs MET treatment parameters, ok to proceed with treatment.      Post Infusion Assessment:  Patient tolerated injection without incident.       Discharge Plan:   Patient and/or family verbalized understanding of discharge instructions and all questions answered.  Patient discharged in stable condition accompanied by: self.  Departure Mode: Ambulatory.      Hailee Velasco RN

## 2025-06-04 ENCOUNTER — INFUSION THERAPY VISIT (OUTPATIENT)
Dept: INFUSION THERAPY | Facility: HOSPITAL | Age: 65
End: 2025-06-04
Attending: INTERNAL MEDICINE
Payer: COMMERCIAL

## 2025-06-04 VITALS
DIASTOLIC BLOOD PRESSURE: 77 MMHG | TEMPERATURE: 98 F | SYSTOLIC BLOOD PRESSURE: 135 MMHG | RESPIRATION RATE: 18 BRPM | HEART RATE: 81 BPM | OXYGEN SATURATION: 97 %

## 2025-06-04 DIAGNOSIS — D46.C MDS (MYELODYSPLASTIC SYNDROME) WITH 5Q DELETION (H): Primary | ICD-10-CM

## 2025-06-04 PROCEDURE — 250N000011 HC RX IP 250 OP 636: Mod: JW | Performed by: STUDENT IN AN ORGANIZED HEALTH CARE EDUCATION/TRAINING PROGRAM

## 2025-06-04 PROCEDURE — 96401 CHEMO ANTI-NEOPL SQ/IM: CPT

## 2025-06-04 RX ADMIN — AZACITIDINE 150 MG: 100 INJECTION, POWDER, LYOPHILIZED, FOR SOLUTION INTRAVENOUS; SUBCUTANEOUS at 13:21

## 2025-06-04 NOTE — PROGRESS NOTES
"Infusion Nursing Note:  Willie Charles presents today for D3 C1 Vidaza.    Patient seen by provider today: No   present during visit today: Not Applicable.    Note: Jimi arrived ambulatory in stable condition. His abdomen is slightly reddened from Vidaza injections, states the sites are tender but that he is tolerating OK.  Pt wife had a lot of questions/concerns regarding Jimi and his future status; she expressed many worries, especially a lot of concern about ability for Jimi to be independent/ left alone for periods of time. She shared that Jimi recently had a UTI which caused him a lot of mental impairment  and she became very nervous about brain-fog sx moving into the future. Jimi/janet shared their story of starting his care in [facility], how they ultimately came to [facility]. They have two homes, pets/animals and stay with their son/Dtr in law while in the [facility] for Jimi's treatments, the MDS journey is relatively new for them and they are navigating their best but face a lot of anxiety and stress. Writer tried to re-assure Jimi and Monica that it is ok to call Triage/MD Clinic with questions/concerns.    Monica did mention she felt Jimi's abd was \"more distended that it used to be\".  Discussed that His activity level has changed a lot in the last 2 years. He denies Chest pain, has no edema, GI/ WNL. Taking deferiprone for elevated ferritin. Fatigue, weakness, general aches, some stomach upset persist. Message sent to Dr Ellis    He denies any adverse effects of his first two Vidaza doses. Denies any new concerns since yesterday.  Vidaza injected to Right lower quadrant. Band aids applied. Jimi will return tomorrow for twice weekly labs and D4 C1.      Intravenous Access:      Treatment Conditions:  Lab Results   Component Value Date    HGB 8.2 (L) 06/03/2025    WBC 2.4 (L) 06/03/2025    ANEU 1.5 (L) 06/03/2025     (L) 06/03/2025        Lab Results   Component Value Date     " 06/03/2025    POTASSIUM 4.5 06/03/2025    CR 1.01 06/03/2025    JACK 9.0 06/03/2025    BILITOTAL 0.4 06/03/2025    ALBUMIN 3.9 06/03/2025     (H) 06/03/2025    AST 72 (H) 06/03/2025       Results reviewed, labs MET treatment parameters, ok to proceed with treatment.      Post Infusion Assessment:  Patient tolerated injection without incident.   Continue to monitor labs twice weekly for possible need to have PRBC or plt transfusion.    Discharge Plan:   Patient and/or family verbalized understanding of discharge instructions and all questions answered.  Patient discharged in stable condition accompanied by: spouse  Departure Mode: Ambulatory.      Amena

## 2025-06-05 ENCOUNTER — INFUSION THERAPY VISIT (OUTPATIENT)
Dept: INFUSION THERAPY | Facility: HOSPITAL | Age: 65
End: 2025-06-05
Attending: INTERNAL MEDICINE
Payer: COMMERCIAL

## 2025-06-05 VITALS
TEMPERATURE: 97.9 F | RESPIRATION RATE: 16 BRPM | SYSTOLIC BLOOD PRESSURE: 112 MMHG | DIASTOLIC BLOOD PRESSURE: 65 MMHG | HEART RATE: 66 BPM

## 2025-06-05 DIAGNOSIS — D46.C MDS (MYELODYSPLASTIC SYNDROME) WITH 5Q DELETION (H): Primary | ICD-10-CM

## 2025-06-05 LAB
BASOPHILS # BLD AUTO: 0 10E3/UL (ref 0–0.2)
BASOPHILS NFR BLD AUTO: 0 %
BLD PROD TYP BPU: NORMAL
BLOOD COMPONENT TYPE: NORMAL
CODING SYSTEM: NORMAL
CROSSMATCH: NORMAL
EOSINOPHIL # BLD AUTO: 0 10E3/UL (ref 0–0.7)
EOSINOPHIL NFR BLD AUTO: 1 %
ERYTHROCYTE [DISTWIDTH] IN BLOOD BY AUTOMATED COUNT: 14.6 % (ref 10–15)
HCT VFR BLD AUTO: 23.7 % (ref 40–53)
HGB BLD-MCNC: 7.7 G/DL (ref 13.3–17.7)
IMM GRANULOCYTES # BLD: 0 10E3/UL
IMM GRANULOCYTES NFR BLD: 1 %
ISSUE DATE AND TIME: NORMAL
LYMPHOCYTES # BLD AUTO: 0.9 10E3/UL (ref 0.8–5.3)
LYMPHOCYTES NFR BLD AUTO: 37 %
MCH RBC QN AUTO: 26.8 PG (ref 26.5–33)
MCHC RBC AUTO-ENTMCNC: 32.5 G/DL (ref 31.5–36.5)
MCV RBC AUTO: 83 FL (ref 78–100)
MONOCYTES # BLD AUTO: 0.2 10E3/UL (ref 0–1.3)
MONOCYTES NFR BLD AUTO: 7 %
NEUTROPHILS # BLD AUTO: 1.3 10E3/UL (ref 1.6–8.3)
NEUTROPHILS NFR BLD AUTO: 54 %
NRBC # BLD AUTO: 0 10E3/UL
NRBC BLD AUTO-RTO: 1 /100
PLATELET # BLD AUTO: 141 10E3/UL (ref 150–450)
RBC # BLD AUTO: 2.87 10E6/UL (ref 4.4–5.9)
UNIT ABO/RH: NORMAL
UNIT NUMBER: NORMAL
UNIT STATUS: NORMAL
UNIT TYPE ISBT: 9500
WBC # BLD AUTO: 2.3 10E3/UL (ref 4–11)

## 2025-06-05 PROCEDURE — P9040 RBC LEUKOREDUCED IRRADIATED: HCPCS | Performed by: STUDENT IN AN ORGANIZED HEALTH CARE EDUCATION/TRAINING PROGRAM

## 2025-06-05 PROCEDURE — 250N000013 HC RX MED GY IP 250 OP 250 PS 637: Performed by: STUDENT IN AN ORGANIZED HEALTH CARE EDUCATION/TRAINING PROGRAM

## 2025-06-05 PROCEDURE — 85025 COMPLETE CBC W/AUTO DIFF WBC: CPT | Performed by: STUDENT IN AN ORGANIZED HEALTH CARE EDUCATION/TRAINING PROGRAM

## 2025-06-05 PROCEDURE — 258N000003 HC RX IP 258 OP 636: Performed by: STUDENT IN AN ORGANIZED HEALTH CARE EDUCATION/TRAINING PROGRAM

## 2025-06-05 PROCEDURE — 250N000011 HC RX IP 250 OP 636: Performed by: STUDENT IN AN ORGANIZED HEALTH CARE EDUCATION/TRAINING PROGRAM

## 2025-06-05 PROCEDURE — 86923 COMPATIBILITY TEST ELECTRIC: CPT | Performed by: STUDENT IN AN ORGANIZED HEALTH CARE EDUCATION/TRAINING PROGRAM

## 2025-06-05 PROCEDURE — 36415 COLL VENOUS BLD VENIPUNCTURE: CPT | Performed by: STUDENT IN AN ORGANIZED HEALTH CARE EDUCATION/TRAINING PROGRAM

## 2025-06-05 RX ORDER — DIPHENHYDRAMINE HCL 50 MG
50 CAPSULE ORAL SEE ADMIN INSTRUCTIONS
Start: 2025-06-05

## 2025-06-05 RX ORDER — EPINEPHRINE 1 MG/ML
0.3 INJECTION, SOLUTION INTRAMUSCULAR; SUBCUTANEOUS EVERY 5 MIN PRN
Status: CANCELLED | OUTPATIENT
Start: 2025-06-05

## 2025-06-05 RX ORDER — HEPARIN SODIUM (PORCINE) LOCK FLUSH IV SOLN 100 UNIT/ML 100 UNIT/ML
5 SOLUTION INTRAVENOUS
OUTPATIENT
Start: 2025-06-05

## 2025-06-05 RX ORDER — ACETAMINOPHEN 325 MG/1
650 TABLET ORAL SEE ADMIN INSTRUCTIONS
Status: DISCONTINUED | OUTPATIENT
Start: 2025-06-05 | End: 2025-06-05 | Stop reason: HOSPADM

## 2025-06-05 RX ORDER — HEPARIN SODIUM,PORCINE 10 UNIT/ML
5-20 VIAL (ML) INTRAVENOUS DAILY PRN
OUTPATIENT
Start: 2025-06-05

## 2025-06-05 RX ORDER — HEPARIN SODIUM,PORCINE 10 UNIT/ML
5-20 VIAL (ML) INTRAVENOUS DAILY PRN
Status: DISCONTINUED | OUTPATIENT
Start: 2025-06-05 | End: 2025-06-05 | Stop reason: HOSPADM

## 2025-06-05 RX ORDER — DIPHENHYDRAMINE HCL 50 MG
50 CAPSULE ORAL SEE ADMIN INSTRUCTIONS
Status: DISCONTINUED | OUTPATIENT
Start: 2025-06-05 | End: 2025-06-05 | Stop reason: HOSPADM

## 2025-06-05 RX ORDER — HEPARIN SODIUM (PORCINE) LOCK FLUSH IV SOLN 100 UNIT/ML 100 UNIT/ML
5 SOLUTION INTRAVENOUS
Status: CANCELLED | OUTPATIENT
Start: 2025-06-05

## 2025-06-05 RX ORDER — EPINEPHRINE 1 MG/ML
0.3 INJECTION, SOLUTION INTRAMUSCULAR; SUBCUTANEOUS EVERY 5 MIN PRN
OUTPATIENT
Start: 2025-06-05

## 2025-06-05 RX ORDER — DIPHENHYDRAMINE HYDROCHLORIDE 50 MG/ML
50 INJECTION, SOLUTION INTRAMUSCULAR; INTRAVENOUS
Start: 2025-06-05

## 2025-06-05 RX ORDER — DIPHENHYDRAMINE HYDROCHLORIDE 50 MG/ML
50 INJECTION, SOLUTION INTRAMUSCULAR; INTRAVENOUS
Status: CANCELLED
Start: 2025-06-05

## 2025-06-05 RX ORDER — DIPHENHYDRAMINE HCL 25 MG
25 CAPSULE ORAL SEE ADMIN INSTRUCTIONS
Status: CANCELLED
Start: 2025-06-05

## 2025-06-05 RX ORDER — ACETAMINOPHEN 325 MG/1
650 TABLET ORAL SEE ADMIN INSTRUCTIONS
Start: 2025-06-05

## 2025-06-05 RX ORDER — DIPHENHYDRAMINE HCL 25 MG
25 CAPSULE ORAL SEE ADMIN INSTRUCTIONS
Status: DISCONTINUED | OUTPATIENT
Start: 2025-06-05 | End: 2025-06-05 | Stop reason: HOSPADM

## 2025-06-05 RX ORDER — HEPARIN SODIUM,PORCINE 10 UNIT/ML
5-20 VIAL (ML) INTRAVENOUS DAILY PRN
Status: CANCELLED | OUTPATIENT
Start: 2025-06-05

## 2025-06-05 RX ORDER — HEPARIN SODIUM (PORCINE) LOCK FLUSH IV SOLN 100 UNIT/ML 100 UNIT/ML
5 SOLUTION INTRAVENOUS
Status: DISCONTINUED | OUTPATIENT
Start: 2025-06-05 | End: 2025-06-05 | Stop reason: HOSPADM

## 2025-06-05 RX ADMIN — SODIUM CHLORIDE 250 ML: 0.9 INJECTION, SOLUTION INTRAVENOUS at 14:07

## 2025-06-05 RX ADMIN — Medication 5 ML: at 16:35

## 2025-06-05 RX ADMIN — ACETAMINOPHEN 650 MG: 325 TABLET ORAL at 14:00

## 2025-06-05 RX ADMIN — AZACITIDINE 150 MG: 100 INJECTION, POWDER, LYOPHILIZED, FOR SOLUTION INTRAVENOUS; SUBCUTANEOUS at 14:49

## 2025-06-05 RX ADMIN — DIPHENHYDRAMINE HYDROCHLORIDE 50 MG: 25 CAPSULE ORAL at 14:00

## 2025-06-05 NOTE — PROGRESS NOTES
"Infusion Nursing Note:  Willie Charles presents today for labs, D4C1 Vidaza, possible Blood Product transfusion.    Patient seen by provider today: No   present during visit today: Not Applicable.    Note: Jimi arrived ambulatory in stable condition. His abdomen is slightly reddened from Vidaza injections, states the sites are tender but that he is tolerating OK.  Yesterday Earnestine's wife had a lot of questions/concerns regarding Jimi and his future status; she expressed many worries, especially a lot of concern about ability for Jimi to be independent/ left alone for periods of time. She shared that Jimi recently had a UTI which caused him a lot of mental impairment  and she became very nervous about brain-fog sx moving into the future. Jimi/wife shared their story of starting his care in [facility], how they ultimately came to [facility]. They have two homes, pets/animals and stay with their son/Dtr in law while in the [facility] for Jimi's treatments, the MDS journey is relatively new for them and they are navigating their best but face a lot of anxiety and stress. Writer tried answer their questions, and to re-assure Jimi and Monica that it is ok to call Triage/MD Clinic with questions/concerns.    Yesterday Monica did mentioned she felt Jimi's abd was \"more distended that it used to be\".  Discussed that His activity level has changed a lot in the last 2 years. He denies Chest pain, has no edema, GI/ WNL. Per Dr Ellis  deferiprone for elevated ferritin is currently on Hold. Fatigue, weakness, general aches, some stomach upset persist.     He denies any adverse effects of his first two Vidaza doses. Denies any new concerns since yesterday.  Vidaza injected to Left lower quadrant. Band aids applied. Jimi will return tomorrow for and D5 C1.      Intravenous Access:      Treatment Conditions:  Lab Results   Component Value Date    HGB 7.7 (L) 06/05/2025    WBC 2.3 (L) 06/05/2025    ANEU 1.3 (L) 06/05/2025    "  (L) 06/05/2025        Lab Results   Component Value Date     06/03/2025    POTASSIUM 4.5 06/03/2025    CR 1.01 06/03/2025    JACK 9.0 06/03/2025    BILITOTAL 0.4 06/03/2025    ALBUMIN 3.9 06/03/2025     (H) 06/03/2025    AST 72 (H) 06/03/2025       Results reviewed, labs MET treatment parameters, ok to proceed with treatment.  Jimi meets parameters for 1u PRBC IRR. Jimi reports previous reaction to blood products and states he needs APAP/diphenhydramine premedication with all blood product transfusions. Orders diphenhydramine 50mg p.o. and acetaminophen 650mg p.o. entered into Blood Therapy Plan, signed by Dr Ellis.    Jimi was given premeds then transfused with 1u IRR PRBC. Tolerated w/o sxs transfusion reaction.      Post Infusion Assessment:  Patient tolerated injection without incident.   Continue to monitor labs twice weekly for possible need to have PRBC or plt transfusion; pt is anticipating needing blood tranfusion Monday. They are planning their drive (5+ hours) back to [place] on Tuesday and would prefer to leave the [facility] as early as possible.     Discharge Plan:   Patient and/or family verbalized understanding of discharge instructions and all questions answered.  Patient discharged in stable condition accompanied by: spouse  Departure Mode: Ambulatory.      Amena

## 2025-06-08 ENCOUNTER — HEALTH MAINTENANCE LETTER (OUTPATIENT)
Age: 65
End: 2025-06-08

## 2025-06-09 ENCOUNTER — LAB (OUTPATIENT)
Dept: INFUSION THERAPY | Facility: HOSPITAL | Age: 65
End: 2025-06-09
Payer: COMMERCIAL

## 2025-06-09 ENCOUNTER — INFUSION THERAPY VISIT (OUTPATIENT)
Dept: INFUSION THERAPY | Facility: HOSPITAL | Age: 65
End: 2025-06-09
Attending: STUDENT IN AN ORGANIZED HEALTH CARE EDUCATION/TRAINING PROGRAM
Payer: COMMERCIAL

## 2025-06-09 VITALS
TEMPERATURE: 97.6 F | OXYGEN SATURATION: 99 % | RESPIRATION RATE: 16 BRPM | BODY MASS INDEX: 27.87 KG/M2 | SYSTOLIC BLOOD PRESSURE: 143 MMHG | DIASTOLIC BLOOD PRESSURE: 78 MMHG | WEIGHT: 186 LBS | HEART RATE: 76 BPM

## 2025-06-09 DIAGNOSIS — D46.C MDS (MYELODYSPLASTIC SYNDROME) WITH 5Q DELETION (H): Primary | ICD-10-CM

## 2025-06-09 LAB
ABO + RH BLD: NORMAL
BASOPHILS # BLD AUTO: 0 10E3/UL (ref 0–0.2)
BASOPHILS NFR BLD AUTO: 1 %
BLD GP AB SCN SERPL QL: NEGATIVE
BLD PROD TYP BPU: NORMAL
BLOOD COMPONENT TYPE: NORMAL
CODING SYSTEM: NORMAL
CROSSMATCH: NORMAL
EOSINOPHIL # BLD AUTO: 0 10E3/UL (ref 0–0.7)
EOSINOPHIL NFR BLD AUTO: 1 %
ERYTHROCYTE [DISTWIDTH] IN BLOOD BY AUTOMATED COUNT: 14.1 % (ref 10–15)
HCT VFR BLD AUTO: 23 % (ref 40–53)
HGB BLD-MCNC: 7.6 G/DL (ref 13.3–17.7)
IMM GRANULOCYTES # BLD: 0 10E3/UL
IMM GRANULOCYTES NFR BLD: 1 %
ISSUE DATE AND TIME: NORMAL
LYMPHOCYTES # BLD AUTO: 0.6 10E3/UL (ref 0.8–5.3)
LYMPHOCYTES NFR BLD AUTO: 26 %
MCH RBC QN AUTO: 27 PG (ref 26.5–33)
MCHC RBC AUTO-ENTMCNC: 33 G/DL (ref 31.5–36.5)
MCV RBC AUTO: 82 FL (ref 78–100)
MONOCYTES # BLD AUTO: 0.2 10E3/UL (ref 0–1.3)
MONOCYTES NFR BLD AUTO: 8 %
NEUTROPHILS # BLD AUTO: 1.4 10E3/UL (ref 1.6–8.3)
NEUTROPHILS NFR BLD AUTO: 64 %
NRBC # BLD AUTO: 0 10E3/UL
NRBC BLD AUTO-RTO: 0 /100
PLATELET # BLD AUTO: 92 10E3/UL (ref 150–450)
RBC # BLD AUTO: 2.82 10E6/UL (ref 4.4–5.9)
SPECIMEN EXP DATE BLD: NORMAL
UNIT ABO/RH: NORMAL
UNIT NUMBER: NORMAL
UNIT STATUS: NORMAL
UNIT TYPE ISBT: 9500
WBC # BLD AUTO: 2.2 10E3/UL (ref 4–11)

## 2025-06-09 PROCEDURE — 36415 COLL VENOUS BLD VENIPUNCTURE: CPT | Performed by: STUDENT IN AN ORGANIZED HEALTH CARE EDUCATION/TRAINING PROGRAM

## 2025-06-09 PROCEDURE — 86923 COMPATIBILITY TEST ELECTRIC: CPT | Performed by: STUDENT IN AN ORGANIZED HEALTH CARE EDUCATION/TRAINING PROGRAM

## 2025-06-09 PROCEDURE — 36430 TRANSFUSION BLD/BLD COMPNT: CPT

## 2025-06-09 PROCEDURE — 86901 BLOOD TYPING SEROLOGIC RH(D): CPT | Performed by: STUDENT IN AN ORGANIZED HEALTH CARE EDUCATION/TRAINING PROGRAM

## 2025-06-09 PROCEDURE — 85025 COMPLETE CBC W/AUTO DIFF WBC: CPT | Performed by: STUDENT IN AN ORGANIZED HEALTH CARE EDUCATION/TRAINING PROGRAM

## 2025-06-09 PROCEDURE — 96401 CHEMO ANTI-NEOPL SQ/IM: CPT

## 2025-06-09 PROCEDURE — 250N000011 HC RX IP 250 OP 636: Performed by: STUDENT IN AN ORGANIZED HEALTH CARE EDUCATION/TRAINING PROGRAM

## 2025-06-09 RX ORDER — HEPARIN SODIUM,PORCINE 10 UNIT/ML
5-20 VIAL (ML) INTRAVENOUS DAILY PRN
Status: CANCELLED | OUTPATIENT
Start: 2025-06-09

## 2025-06-09 RX ORDER — EPINEPHRINE 1 MG/ML
0.3 INJECTION, SOLUTION INTRAMUSCULAR; SUBCUTANEOUS EVERY 5 MIN PRN
Status: DISCONTINUED | OUTPATIENT
Start: 2025-06-09 | End: 2025-06-09 | Stop reason: HOSPADM

## 2025-06-09 RX ORDER — DIPHENHYDRAMINE HYDROCHLORIDE 50 MG/ML
50 INJECTION, SOLUTION INTRAMUSCULAR; INTRAVENOUS
Status: CANCELLED
Start: 2025-06-09

## 2025-06-09 RX ORDER — ACETAMINOPHEN 325 MG/1
650 TABLET ORAL SEE ADMIN INSTRUCTIONS
Status: CANCELLED
Start: 2025-06-09

## 2025-06-09 RX ORDER — EPINEPHRINE 1 MG/ML
0.3 INJECTION, SOLUTION INTRAMUSCULAR; SUBCUTANEOUS EVERY 5 MIN PRN
Status: CANCELLED | OUTPATIENT
Start: 2025-06-09

## 2025-06-09 RX ORDER — DIPHENHYDRAMINE HYDROCHLORIDE 50 MG/ML
50 INJECTION, SOLUTION INTRAMUSCULAR; INTRAVENOUS
Status: DISCONTINUED | OUTPATIENT
Start: 2025-06-09 | End: 2025-06-09 | Stop reason: HOSPADM

## 2025-06-09 RX ORDER — HEPARIN SODIUM (PORCINE) LOCK FLUSH IV SOLN 100 UNIT/ML 100 UNIT/ML
5 SOLUTION INTRAVENOUS
Status: CANCELLED | OUTPATIENT
Start: 2025-06-09

## 2025-06-09 RX ORDER — DIPHENHYDRAMINE HCL 50 MG
50 CAPSULE ORAL SEE ADMIN INSTRUCTIONS
Status: DISCONTINUED | OUTPATIENT
Start: 2025-06-09 | End: 2025-06-09 | Stop reason: HOSPADM

## 2025-06-09 RX ORDER — ACETAMINOPHEN 325 MG/1
650 TABLET ORAL SEE ADMIN INSTRUCTIONS
Status: DISCONTINUED | OUTPATIENT
Start: 2025-06-09 | End: 2025-06-09 | Stop reason: HOSPADM

## 2025-06-09 RX ORDER — HEPARIN SODIUM (PORCINE) LOCK FLUSH IV SOLN 100 UNIT/ML 100 UNIT/ML
5 SOLUTION INTRAVENOUS
Status: DISCONTINUED | OUTPATIENT
Start: 2025-06-09 | End: 2025-06-09 | Stop reason: HOSPADM

## 2025-06-09 RX ORDER — DIPHENHYDRAMINE HCL 50 MG
50 CAPSULE ORAL SEE ADMIN INSTRUCTIONS
Status: CANCELLED
Start: 2025-06-09

## 2025-06-09 RX ADMIN — AZACITIDINE 150 MG: 100 INJECTION, POWDER, LYOPHILIZED, FOR SOLUTION INTRAVENOUS; SUBCUTANEOUS at 14:13

## 2025-06-09 RX ADMIN — Medication 5 ML: at 14:24

## 2025-06-09 NOTE — PROGRESS NOTES
Infusion Nursing Note:  Willie NAKITA Charles presents today for Vidaza injection.   Patient seen by provider today: No   present during visit today: Not Applicable.    Note: vs and assessment completed,  Vidaza x 2 given in RUQ,  Hgb was 7.6, port was flushed and left accessed for tomorrow. pt will receive one unit of PRBC's tomorrow.      Intravenous Access:  Implanted Port.    Treatment Conditions:  Lab Results   Component Value Date    HGB 7.6 (L) 06/09/2025    WBC 2.2 (L) 06/09/2025    ANEU 1.4 (L) 06/09/2025    PLT 92 (L) 06/09/2025        Results reviewed, labs MET treatment parameters, ok to proceed with treatment.  Blood transfusion consent signed 5/30/25.      Post Infusion Assessment:  Patient tolerated injections without incident.       Discharge Plan:   Discharge instructions reviewed with: Patient.  Patient and/or family verbalized understanding of discharge instructions and all questions answered.  Patient discharged in stable condition accompanied by: self and wife.  Departure Mode: Ambulatory.      [place] RN

## 2025-06-10 ENCOUNTER — INFUSION THERAPY VISIT (OUTPATIENT)
Dept: INFUSION THERAPY | Facility: HOSPITAL | Age: 65
End: 2025-06-10
Attending: STUDENT IN AN ORGANIZED HEALTH CARE EDUCATION/TRAINING PROGRAM
Payer: COMMERCIAL

## 2025-06-10 VITALS
TEMPERATURE: 98.2 F | RESPIRATION RATE: 16 BRPM | SYSTOLIC BLOOD PRESSURE: 142 MMHG | DIASTOLIC BLOOD PRESSURE: 68 MMHG | HEART RATE: 81 BPM | OXYGEN SATURATION: 97 %

## 2025-06-10 DIAGNOSIS — D46.C MDS (MYELODYSPLASTIC SYNDROME) WITH 5Q DELETION (H): Primary | ICD-10-CM

## 2025-06-10 PROCEDURE — 96401 CHEMO ANTI-NEOPL SQ/IM: CPT

## 2025-06-10 PROCEDURE — 258N000003 HC RX IP 258 OP 636: Performed by: STUDENT IN AN ORGANIZED HEALTH CARE EDUCATION/TRAINING PROGRAM

## 2025-06-10 PROCEDURE — P9040 RBC LEUKOREDUCED IRRADIATED: HCPCS | Performed by: STUDENT IN AN ORGANIZED HEALTH CARE EDUCATION/TRAINING PROGRAM

## 2025-06-10 PROCEDURE — 250N000013 HC RX MED GY IP 250 OP 250 PS 637: Performed by: STUDENT IN AN ORGANIZED HEALTH CARE EDUCATION/TRAINING PROGRAM

## 2025-06-10 PROCEDURE — 250N000011 HC RX IP 250 OP 636: Performed by: STUDENT IN AN ORGANIZED HEALTH CARE EDUCATION/TRAINING PROGRAM

## 2025-06-10 PROCEDURE — 36430 TRANSFUSION BLD/BLD COMPNT: CPT

## 2025-06-10 RX ORDER — EPINEPHRINE 1 MG/ML
0.3 INJECTION, SOLUTION INTRAMUSCULAR; SUBCUTANEOUS EVERY 5 MIN PRN
Status: DISCONTINUED | OUTPATIENT
Start: 2025-06-10 | End: 2025-06-10 | Stop reason: HOSPADM

## 2025-06-10 RX ORDER — DIPHENHYDRAMINE HYDROCHLORIDE 50 MG/ML
50 INJECTION, SOLUTION INTRAMUSCULAR; INTRAVENOUS
Start: 2025-06-10

## 2025-06-10 RX ORDER — HEPARIN SODIUM (PORCINE) LOCK FLUSH IV SOLN 100 UNIT/ML 100 UNIT/ML
5 SOLUTION INTRAVENOUS
Status: DISCONTINUED | OUTPATIENT
Start: 2025-06-10 | End: 2025-06-10 | Stop reason: HOSPADM

## 2025-06-10 RX ORDER — DIPHENHYDRAMINE HCL 50 MG
50 CAPSULE ORAL SEE ADMIN INSTRUCTIONS
Start: 2025-06-10

## 2025-06-10 RX ORDER — DIPHENHYDRAMINE HYDROCHLORIDE 50 MG/ML
50 INJECTION, SOLUTION INTRAMUSCULAR; INTRAVENOUS
Status: DISCONTINUED | OUTPATIENT
Start: 2025-06-10 | End: 2025-06-10 | Stop reason: HOSPADM

## 2025-06-10 RX ORDER — HEPARIN SODIUM (PORCINE) LOCK FLUSH IV SOLN 100 UNIT/ML 100 UNIT/ML
5 SOLUTION INTRAVENOUS
OUTPATIENT
Start: 2025-06-10

## 2025-06-10 RX ORDER — DIPHENHYDRAMINE HCL 50 MG
50 CAPSULE ORAL SEE ADMIN INSTRUCTIONS
Status: DISCONTINUED | OUTPATIENT
Start: 2025-06-10 | End: 2025-06-10 | Stop reason: HOSPADM

## 2025-06-10 RX ORDER — ACETAMINOPHEN 325 MG/1
650 TABLET ORAL SEE ADMIN INSTRUCTIONS
Start: 2025-06-10

## 2025-06-10 RX ORDER — EPINEPHRINE 1 MG/ML
0.3 INJECTION, SOLUTION INTRAMUSCULAR; SUBCUTANEOUS EVERY 5 MIN PRN
OUTPATIENT
Start: 2025-06-10

## 2025-06-10 RX ORDER — ACETAMINOPHEN 325 MG/1
650 TABLET ORAL SEE ADMIN INSTRUCTIONS
Status: DISCONTINUED | OUTPATIENT
Start: 2025-06-10 | End: 2025-06-10 | Stop reason: HOSPADM

## 2025-06-10 RX ORDER — HEPARIN SODIUM,PORCINE 10 UNIT/ML
5-20 VIAL (ML) INTRAVENOUS DAILY PRN
OUTPATIENT
Start: 2025-06-10

## 2025-06-10 RX ORDER — DIPHENHYDRAMINE HYDROCHLORIDE 50 MG/ML
25 INJECTION, SOLUTION INTRAMUSCULAR; INTRAVENOUS
Status: DISCONTINUED | OUTPATIENT
Start: 2025-06-10 | End: 2025-06-10 | Stop reason: HOSPADM

## 2025-06-10 RX ADMIN — ACETAMINOPHEN 650 MG: 325 TABLET ORAL at 09:16

## 2025-06-10 RX ADMIN — DIPHENHYDRAMINE HYDROCHLORIDE 50 MG: 50 CAPSULE ORAL at 09:16

## 2025-06-10 RX ADMIN — SODIUM CHLORIDE 250 ML: 0.9 INJECTION, SOLUTION INTRAVENOUS at 10:00

## 2025-06-10 RX ADMIN — Medication 5 ML: at 12:39

## 2025-06-10 RX ADMIN — AZACITIDINE 150 MG: 100 INJECTION, POWDER, LYOPHILIZED, FOR SOLUTION INTRAVENOUS; SUBCUTANEOUS at 12:21

## 2025-06-10 NOTE — PROGRESS NOTES
Infusion Nursing Note:  Willie Charles presents today for Vidaza injection and 1 unit PRBC.   Patient seen by provider today: No   present during visit today: Not Applicable.    Note: Patient arrives via ambulatory accompanied by his wife, patient is alert and oriented X 4 and VSS.   Blood administered per protocol without incident.  Vidaza x 2 given in LUQ abdomen,   port was flushed and heparinized.  Patient will return as directed.      Intravenous Access:  Implanted Port.    Treatment Conditions:  Lab Results   Component Value Date    HGB 7.6 (L) 06/09/2025    WBC 2.2 (L) 06/09/2025    ANEU 1.4 (L) 06/09/2025    PLT 92 (L) 06/09/2025        Results reviewed, labs MET treatment parameters, ok to proceed with treatment.  Blood transfusion consent signed 5/30/25.      Post Infusion Assessment:  Patient tolerated transfusion and injections without incident.       Discharge Plan:   Discharge instructions reviewed with: Patient.  Patient and/or family verbalized understanding of discharge instructions and all questions answered.  Patient discharged in stable condition accompanied by: self and wife.  Departure Mode: Ambulatory.      Ximena Ugarte RN

## 2025-06-11 ENCOUNTER — PATIENT OUTREACH (OUTPATIENT)
Dept: ONCOLOGY | Facility: CLINIC | Age: 65
End: 2025-06-11
Payer: COMMERCIAL

## 2025-06-11 ENCOUNTER — TELEPHONE (OUTPATIENT)
Dept: ONCOLOGY | Facility: CLINIC | Age: 65
End: 2025-06-11
Payer: COMMERCIAL

## 2025-06-11 DIAGNOSIS — D46.C MDS (MYELODYSPLASTIC SYNDROME) WITH 5Q DELETION (H): Primary | ICD-10-CM

## 2025-06-11 NOTE — PROGRESS NOTES
[facility]: Cancer Care                                                                                          Had some nausea and vomiting last Friday after treatment. Has been taking his PRN medications and that has helped manage it. Discussed taking anti nausea medications proactively as needed during treatment.   Reviewed that he should continue to take the PO chemo for the full 14 days even though he is done with the injections.   Reviewed schedule plan for next cycle.   Reviewed that he should continue with 2x weekly lab work at the [facility], per Monica Krause is scheduled for labs on Friday 6/13.           Signature:  Abby Reyes RN

## 2025-06-11 NOTE — ORAL ONC MGMT
Oral Chemotherapy Monitoring Program     Placed call to patient's wife (per contact preferences, consent to communicate on file) in follow up of oral chemotherapy. Left message requesting call back. No drug names were mentioned. Will update when response received.     Hailee Lambert, PharmD, [facility]  Oral Chemotherapy Monitoring Program  [facility]  [phone]

## 2025-06-12 ENCOUNTER — TELEPHONE (OUTPATIENT)
Dept: ONCOLOGY | Facility: CLINIC | Age: 65
End: 2025-06-12
Payer: COMMERCIAL

## 2025-06-12 ENCOUNTER — NURSE TRIAGE (OUTPATIENT)
Dept: ONCOLOGY | Facility: CLINIC | Age: 65
End: 2025-06-12
Payer: COMMERCIAL

## 2025-06-12 DIAGNOSIS — D46.9 MDS (MYELODYSPLASTIC SYNDROME) (H): ICD-10-CM

## 2025-06-12 RX ORDER — DEFERIPRONE 1000 MG/1
2000 TABLET ORAL 3 TIMES DAILY
Qty: 180 TABLET | Refills: 0 | OUTPATIENT
Start: 2025-06-12

## 2025-06-12 NOTE — TELEPHONE ENCOUNTER
"Deferiprone 1000mg tab  Last prescribing provider: Dr. Ellis     Last clinic visit date: 5/28/25    Recommendations for requested medication (if none, N/A): 5/28/25, \"he will stop deferiprone now\"    Any other pertinent information (if none, N/A): NA    Refilled: Y/N, if NO, why?    "

## 2025-06-12 NOTE — TELEPHONE ENCOUNTER
Oncology Nurse Triage - Fever     Situation-   Call transferred from Jennifer, pharmacist. Spoke w/ spouse, Monica, with pt in background. Spouse confused why she was transferred, said this is a known issue and pt is feeling fine today.     Background:   Treating Provider:  Dr. Ellis     Date of last office visit: 5/28/25    Has patient received recent chemotherapy (if yes, when and drug): Yes: 6/10/25 Vidaza     Recent blood transfusion: Yes 6/10/25 1 unit PRBC    Assessment:   Onset of symptoms:  2-3x/ since recent chemo treatment      Sporadic   Fever (maximum temperature recorded in last 24 hours): 102.3 last night- took 1g Tylenol and temp dropped to 98 in 30 minutes.   No fevers today.    Chills:Yes- resolve after fever passes    Any of the following signs of infection present:     Sore throat: No    Cough present: No    Mucositis - Ulceration/erythema present: No    Diarrhea: No    Urinary symptoms:   No changes in voiding. Normal frequency and characteristics.    Any skin discoloration or wounds/sores/surgical sites present (if yes, where): No    Additional information (if necessary): Eating 3 small meals/day.  Constipation has worsened- using Miralax OTC 1 capful/day.     Current interventions patient is using: Tylenol 1g PRN   Has blood work 3x/wk in [place]     They are not concerned with the sporadic fevers, as they resolve before 4 hours. They said [facility] and Dr. Ellis are aware of the fevers.   They are overwhelmed with the number of calls, said they had 7 calls on Monday alone and they are not sure who they even talked to. Last week, they had calls every single day last week.     Recommendations:   Suggested pt add Senna 1 tab/day in addition to Miralax for constipation.   Reviewed when to call triage- if fever does not resolve/returns, signs of UTI, cough, sore throat, any signs of infection. Spouse voiced understanding. Informed will send message to care team to see how we can reduce number of  calls, as they are feeling very overwhelmed.     1655 call to spouse, LVM advising pt go to ED per Dr. Ellis for neutropenic fever.   1658 LVM on pt's cell number with recommendations for ED.   1700 [employer] message also sent to pt.

## 2025-06-12 NOTE — TELEPHONE ENCOUNTER
Oral Chemotherapy Monitoring Program    Subjective/Objective:  Willie Charles is a pleasant 65 year old male contacted by phone for an initial assessment for oral chemotherapy. Patient was unavailable, but was able to spoke with patient's caregiver, Monica Charles. Caregiver confirmed that the patient is taking venetoclax 1 tablet (100 mg) by mouth every day. Patient started venetoclax with the infusion, and first day of venetoclax (C1D1) is 6/2/25. Patient's caregiver was not fully aware of the prophylactic medication, posaconazole. Patient started OTC stool softer and OTC acetaminophen. Patient is experiencing constipation which is grade 1 and is intermittent. Patient reported taking OTC stool softer and is effective. Patient's appetite is slightly changed, and caregiver reported that patient take meals regularly with normal portion but does not consume a lot in a single meal. Caregiver also stated that patient is experiencing nausea (<3 times a day). Ondansetron PRN is effective for the patient, and nausea only slightly altered the eating habit. Caregiver reported one incidence where patient was vomiting 4 times on 6/5/25 after infusion. This uncontrolled vomiting was self-limiting, and caregiver denied prolonged uncontrolled vomiting afterwards. Patient's caregiver have been encouraging patient to increase the fluid intake. Patient reported fever and chills that is that was grade 1. Patient's fever has been recurring issue recently, and temperature from last night (6/11/25) was 102.2 ?F. Patient's caregiver reported providing the patient OTC acetaminophen, which controlled patient fever. Patient's temperature dropped to 99.1 ?F after taking acetaminophen. Caregiver reported that patient experienced chills on 6/11/25. Patient's caregiver stated that patient has been experiencing flu-like general body ache.        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   Providers Caleb Ellis   [facility]   Drug Name Venclexta (venetoclax)  Venclexta (venetoclax)  Venclexta (venetoclax)  Venclexta (venetoclax)  Venclexta (venetoclax) Venclexta (venetoclax)   Dose 100 mg  100 mg  100 mg  100 mg  100 mg 100 mg   Current Schedule Daily  Daily  Daily  Daily  Daily Daily   Cycle Details 2 weeks on, 2 weeks off 2 weeks on, 2 weeks off 2 weeks on, 2 weeks off 2 weeks on, 2 weeks off 2 weeks on, 2 weeks off 2 weeks on, 2 weeks off   Start Date of Last Cycle    6/2/2025 6/2/2025 6/2/2025   Planned next cycle start date   5/30/2025 6/30/2025 6/30/2025   Doses missed in last 2 weeks      0   Adherence Assessment      Adherent   Adverse Effects      Constipation;Nausea   Nausea      Grade 2   Pharmacist Intervention(nausea)      No   Constipation      Grade 1   Pharmacist Intervention(constipation)      No       Data saved with a previous flowsheet row definition       Last PHQ-2 Score on record:       4/16/2025    10:17 AM 6/28/2024     3:17 PM   PHQ-2 ( 1999 [employer])   Q1: Little interest or pleasure in doing things 0 0   Q2: Feeling down, depressed or hopeless 0 0   PHQ-2 Score 0 0       Vitals:  BP:   BP Readings from Last 1 Encounters:   06/10/25 (!) 142/68     Wt Readings from Last 1 Encounters:   06/09/25 84.4 kg (186 lb)     Estimated body surface area is 2.02 meters squared as calculated from the following:    Height as of 5/13/25: 1.74 m (5' 8.5\").    Weight as of 6/9/25: 84.4 kg (186 lb).    Labs:  _  Result Component Current Result Ref Range "   Sodium 137 (6/3/2025) 135 - 145 mmol/L     _  Result Component Current Result Ref Range   Potassium 4.5 (6/3/2025) 3.4 - 5.3 mmol/L     _  Result Component Current Result Ref Range   Calcium 9.0 (6/3/2025) 8.8 - 10.4 mg/dL     No results found for Mag within last 30 days.     _  Result Component Current Result Ref Range   Phosphorus 3.4 (6/3/2025) 2.5 - 4.5 mg/dL     _  Result Component Current Result Ref Range   Albumin 3.9 (6/3/2025) 3.5 - 5.2 g/dL     _  Result Component Current Result Ref Range   Urea Nitrogen 19.7 (6/3/2025) 8.0 - 23.0 mg/dL     _  Result Component Current Result Ref Range   Creatinine 1.01 (6/3/2025) 0.67 - 1.17 mg/dL     _  Result Component Current Result Ref Range   AST 72 (H) (6/3/2025) 0 - 45 U/L     _  Result Component Current Result Ref Range    (H) (6/3/2025) 0 - 70 U/L     _  Result Component Current Result Ref Range   Bilirubin Total 0.4 (6/3/2025) <=1.2 mg/dL     _  Result Component Current Result Ref Range   WBC Count 2.2 (L) (6/9/2025) 4.0 - 11.0 10e3/uL     _  Result Component Current Result Ref Range   Hemoglobin 7.6 (L) (6/9/2025) 13.3 - 17.7 g/dL     _  Result Component Current Result Ref Range   Platelet Count 92 (L) (6/9/2025) 150 - 450 10e3/uL     _  Result Component Current Result Ref Range   Absolute Neutrophils 1.4 (L) (6/9/2025) 1.6 - 8.3 10e3/uL     No results found for ANC within last 30 days.          Assessment/Plan:  Patient's fever, chills, and general body ache raised concern of possible infection. Nurse triage was initiated. Patient showed unfamiliarly with prophylactic medication, posaconazole, and assessment for patient's adherence to prophylactic medication is needed.    Follow-Up:  -Watch for update from nurse triage or hospital admission  -6/18/25 @1215 Milagro appointment  -6/25/25 @1015 Dr. Ellis appointment with lab draw    Refill Due:  -watch for possible hold  -6/30/25    JONO Fraire (Jeongmin)  Pharmacy Intern  Oral Chemotherapy Monitoring  Program  [facility]  [phone]

## 2025-06-15 RX ORDER — MEPERIDINE HYDROCHLORIDE 25 MG/ML
25 INJECTION INTRAMUSCULAR; INTRAVENOUS; SUBCUTANEOUS
OUTPATIENT
Start: 2025-06-30

## 2025-06-15 RX ORDER — DIPHENHYDRAMINE HYDROCHLORIDE 50 MG/ML
25 INJECTION, SOLUTION INTRAMUSCULAR; INTRAVENOUS
Start: 2025-06-30

## 2025-06-15 RX ORDER — HEPARIN SODIUM,PORCINE 10 UNIT/ML
5-20 VIAL (ML) INTRAVENOUS DAILY PRN
OUTPATIENT
Start: 2025-07-04

## 2025-06-15 RX ORDER — LORAZEPAM 2 MG/ML
0.5 INJECTION INTRAMUSCULAR EVERY 4 HOURS PRN
OUTPATIENT
Start: 2025-07-04

## 2025-06-15 RX ORDER — HEPARIN SODIUM (PORCINE) LOCK FLUSH IV SOLN 100 UNIT/ML 100 UNIT/ML
5 SOLUTION INTRAVENOUS
OUTPATIENT
Start: 2025-07-04

## 2025-06-15 RX ORDER — DIPHENHYDRAMINE HYDROCHLORIDE 50 MG/ML
50 INJECTION, SOLUTION INTRAMUSCULAR; INTRAVENOUS
Start: 2025-07-01

## 2025-06-15 RX ORDER — HEPARIN SODIUM,PORCINE 10 UNIT/ML
5-20 VIAL (ML) INTRAVENOUS DAILY PRN
OUTPATIENT
Start: 2025-07-03

## 2025-06-15 RX ORDER — DIPHENHYDRAMINE HYDROCHLORIDE 50 MG/ML
50 INJECTION, SOLUTION INTRAMUSCULAR; INTRAVENOUS
Start: 2025-07-04

## 2025-06-15 RX ORDER — MEPERIDINE HYDROCHLORIDE 25 MG/ML
25 INJECTION INTRAMUSCULAR; INTRAVENOUS; SUBCUTANEOUS
OUTPATIENT
Start: 2025-07-01

## 2025-06-15 RX ORDER — ALBUTEROL SULFATE 90 UG/1
1-2 INHALANT RESPIRATORY (INHALATION)
Start: 2025-07-03

## 2025-06-15 RX ORDER — ALBUTEROL SULFATE 90 UG/1
1-2 INHALANT RESPIRATORY (INHALATION)
Start: 2025-07-02

## 2025-06-15 RX ORDER — ALBUTEROL SULFATE 90 UG/1
1-2 INHALANT RESPIRATORY (INHALATION)
Start: 2025-07-04

## 2025-06-15 RX ORDER — EPINEPHRINE 1 MG/ML
0.3 INJECTION, SOLUTION INTRAMUSCULAR; SUBCUTANEOUS EVERY 5 MIN PRN
OUTPATIENT
Start: 2025-06-30

## 2025-06-15 RX ORDER — METHYLPREDNISOLONE SODIUM SUCCINATE 40 MG/ML
40 INJECTION INTRAMUSCULAR; INTRAVENOUS
Start: 2025-07-01

## 2025-06-15 RX ORDER — MEPERIDINE HYDROCHLORIDE 25 MG/ML
25 INJECTION INTRAMUSCULAR; INTRAVENOUS; SUBCUTANEOUS
OUTPATIENT
Start: 2025-07-04

## 2025-06-15 RX ORDER — DIPHENHYDRAMINE HYDROCHLORIDE 50 MG/ML
50 INJECTION, SOLUTION INTRAMUSCULAR; INTRAVENOUS
Start: 2025-07-02

## 2025-06-15 RX ORDER — ALBUTEROL SULFATE 0.83 MG/ML
2.5 SOLUTION RESPIRATORY (INHALATION)
OUTPATIENT
Start: 2025-06-30

## 2025-06-15 RX ORDER — HEPARIN SODIUM,PORCINE 10 UNIT/ML
5-20 VIAL (ML) INTRAVENOUS DAILY PRN
OUTPATIENT
Start: 2025-07-02

## 2025-06-15 RX ORDER — HEPARIN SODIUM (PORCINE) LOCK FLUSH IV SOLN 100 UNIT/ML 100 UNIT/ML
5 SOLUTION INTRAVENOUS
OUTPATIENT
Start: 2025-06-30

## 2025-06-15 RX ORDER — ALBUTEROL SULFATE 90 UG/1
1-2 INHALANT RESPIRATORY (INHALATION)
Start: 2025-06-30

## 2025-06-15 RX ORDER — DIPHENHYDRAMINE HYDROCHLORIDE 50 MG/ML
50 INJECTION, SOLUTION INTRAMUSCULAR; INTRAVENOUS
Start: 2025-07-03

## 2025-06-15 RX ORDER — HEPARIN SODIUM (PORCINE) LOCK FLUSH IV SOLN 100 UNIT/ML 100 UNIT/ML
5 SOLUTION INTRAVENOUS
OUTPATIENT
Start: 2025-07-03

## 2025-06-15 RX ORDER — LORAZEPAM 2 MG/ML
0.5 INJECTION INTRAMUSCULAR EVERY 4 HOURS PRN
OUTPATIENT
Start: 2025-07-02

## 2025-06-15 RX ORDER — ALBUTEROL SULFATE 0.83 MG/ML
2.5 SOLUTION RESPIRATORY (INHALATION)
OUTPATIENT
Start: 2025-07-03

## 2025-06-15 RX ORDER — EPINEPHRINE 1 MG/ML
0.3 INJECTION, SOLUTION INTRAMUSCULAR; SUBCUTANEOUS EVERY 5 MIN PRN
OUTPATIENT
Start: 2025-07-01

## 2025-06-15 RX ORDER — EPINEPHRINE 1 MG/ML
0.3 INJECTION, SOLUTION INTRAMUSCULAR; SUBCUTANEOUS EVERY 5 MIN PRN
OUTPATIENT
Start: 2025-07-02

## 2025-06-15 RX ORDER — HEPARIN SODIUM,PORCINE 10 UNIT/ML
5-20 VIAL (ML) INTRAVENOUS DAILY PRN
OUTPATIENT
Start: 2025-07-01

## 2025-06-15 RX ORDER — DIPHENHYDRAMINE HYDROCHLORIDE 50 MG/ML
25 INJECTION, SOLUTION INTRAMUSCULAR; INTRAVENOUS
Start: 2025-07-01

## 2025-06-15 RX ORDER — LORAZEPAM 2 MG/ML
0.5 INJECTION INTRAMUSCULAR EVERY 4 HOURS PRN
OUTPATIENT
Start: 2025-06-30

## 2025-06-15 RX ORDER — DIPHENHYDRAMINE HYDROCHLORIDE 50 MG/ML
25 INJECTION, SOLUTION INTRAMUSCULAR; INTRAVENOUS
Start: 2025-07-02

## 2025-06-15 RX ORDER — DIPHENHYDRAMINE HYDROCHLORIDE 50 MG/ML
50 INJECTION, SOLUTION INTRAMUSCULAR; INTRAVENOUS
Start: 2025-06-30

## 2025-06-15 RX ORDER — MEPERIDINE HYDROCHLORIDE 25 MG/ML
25 INJECTION INTRAMUSCULAR; INTRAVENOUS; SUBCUTANEOUS
OUTPATIENT
Start: 2025-07-03

## 2025-06-15 RX ORDER — HEPARIN SODIUM (PORCINE) LOCK FLUSH IV SOLN 100 UNIT/ML 100 UNIT/ML
5 SOLUTION INTRAVENOUS
OUTPATIENT
Start: 2025-07-01

## 2025-06-15 RX ORDER — HEPARIN SODIUM,PORCINE 10 UNIT/ML
5-20 VIAL (ML) INTRAVENOUS DAILY PRN
OUTPATIENT
Start: 2025-06-30

## 2025-06-15 RX ORDER — METHYLPREDNISOLONE SODIUM SUCCINATE 40 MG/ML
40 INJECTION INTRAMUSCULAR; INTRAVENOUS
Start: 2025-07-04

## 2025-06-15 RX ORDER — ALBUTEROL SULFATE 0.83 MG/ML
2.5 SOLUTION RESPIRATORY (INHALATION)
OUTPATIENT
Start: 2025-07-01

## 2025-06-15 RX ORDER — DIPHENHYDRAMINE HYDROCHLORIDE 50 MG/ML
25 INJECTION, SOLUTION INTRAMUSCULAR; INTRAVENOUS
Start: 2025-07-03

## 2025-06-15 RX ORDER — ALBUTEROL SULFATE 0.83 MG/ML
2.5 SOLUTION RESPIRATORY (INHALATION)
OUTPATIENT
Start: 2025-07-04

## 2025-06-15 RX ORDER — LORAZEPAM 2 MG/ML
0.5 INJECTION INTRAMUSCULAR EVERY 4 HOURS PRN
OUTPATIENT
Start: 2025-07-01

## 2025-06-15 RX ORDER — EPINEPHRINE 1 MG/ML
0.3 INJECTION, SOLUTION INTRAMUSCULAR; SUBCUTANEOUS EVERY 5 MIN PRN
OUTPATIENT
Start: 2025-07-04

## 2025-06-15 RX ORDER — DIPHENHYDRAMINE HYDROCHLORIDE 50 MG/ML
25 INJECTION, SOLUTION INTRAMUSCULAR; INTRAVENOUS
Start: 2025-07-04

## 2025-06-15 RX ORDER — METHYLPREDNISOLONE SODIUM SUCCINATE 40 MG/ML
40 INJECTION INTRAMUSCULAR; INTRAVENOUS
Start: 2025-07-03

## 2025-06-15 RX ORDER — METHYLPREDNISOLONE SODIUM SUCCINATE 40 MG/ML
40 INJECTION INTRAMUSCULAR; INTRAVENOUS
Start: 2025-06-30

## 2025-06-15 RX ORDER — METHYLPREDNISOLONE SODIUM SUCCINATE 40 MG/ML
40 INJECTION INTRAMUSCULAR; INTRAVENOUS
Start: 2025-07-02

## 2025-06-15 RX ORDER — MEPERIDINE HYDROCHLORIDE 25 MG/ML
25 INJECTION INTRAMUSCULAR; INTRAVENOUS; SUBCUTANEOUS
OUTPATIENT
Start: 2025-07-02

## 2025-06-15 RX ORDER — EPINEPHRINE 1 MG/ML
0.3 INJECTION, SOLUTION INTRAMUSCULAR; SUBCUTANEOUS EVERY 5 MIN PRN
OUTPATIENT
Start: 2025-07-03

## 2025-06-15 RX ORDER — LORAZEPAM 2 MG/ML
0.5 INJECTION INTRAMUSCULAR EVERY 4 HOURS PRN
OUTPATIENT
Start: 2025-07-03

## 2025-06-15 RX ORDER — ALBUTEROL SULFATE 90 UG/1
1-2 INHALANT RESPIRATORY (INHALATION)
Start: 2025-07-01

## 2025-06-15 RX ORDER — ALBUTEROL SULFATE 0.83 MG/ML
2.5 SOLUTION RESPIRATORY (INHALATION)
OUTPATIENT
Start: 2025-07-02

## 2025-06-15 RX ORDER — HEPARIN SODIUM (PORCINE) LOCK FLUSH IV SOLN 100 UNIT/ML 100 UNIT/ML
5 SOLUTION INTRAVENOUS
OUTPATIENT
Start: 2025-07-02

## 2025-06-16 ENCOUNTER — MYC MEDICAL ADVICE (OUTPATIENT)
Dept: ONCOLOGY | Facility: CLINIC | Age: 65
End: 2025-06-16
Payer: COMMERCIAL

## 2025-06-16 NOTE — TELEPHONE ENCOUNTER
[facility]: Cancer Care                                                                                          Writer called Monica and informed her that Jimi needed to go to the ER. Stated that the recommendation when they called at the end of last week was to go to the ER and that he still needs to go to the ER. Advised that he may need IV anitbiotics for an infection.   Monica verbalized understanding, currently with Jimi at the [facility] and stated will take Jimi to the emergency room.     Signature:  Abby Reyes RN

## 2025-06-19 ENCOUNTER — MYC MEDICAL ADVICE (OUTPATIENT)
Dept: ONCOLOGY | Facility: CLINIC | Age: 65
End: 2025-06-19
Payer: COMMERCIAL

## 2025-07-01 ENCOUNTER — PATIENT OUTREACH (OUTPATIENT)
Dept: CARE COORDINATION | Facility: CLINIC | Age: 65
End: 2025-07-01
Payer: COMMERCIAL

## 2025-07-03 ENCOUNTER — PATIENT OUTREACH (OUTPATIENT)
Dept: CARE COORDINATION | Facility: CLINIC | Age: 65
End: 2025-07-03
Payer: COMMERCIAL

## 2025-07-03 ENCOUNTER — VIRTUAL VISIT (OUTPATIENT)
Dept: ONCOLOGY | Facility: CLINIC | Age: 65
End: 2025-07-03
Attending: STUDENT IN AN ORGANIZED HEALTH CARE EDUCATION/TRAINING PROGRAM
Payer: COMMERCIAL

## 2025-07-03 ENCOUNTER — PATIENT OUTREACH (OUTPATIENT)
Dept: ONCOLOGY | Facility: CLINIC | Age: 65
End: 2025-07-03
Payer: COMMERCIAL

## 2025-07-03 DIAGNOSIS — D70.9 NEUTROPENIA, UNSPECIFIED TYPE: ICD-10-CM

## 2025-07-03 DIAGNOSIS — K12.30 MUCOSITIS: ICD-10-CM

## 2025-07-03 DIAGNOSIS — D46.C MDS (MYELODYSPLASTIC SYNDROME) WITH 5Q DELETION (H): Primary | ICD-10-CM

## 2025-07-03 PROCEDURE — 1126F AMNT PAIN NOTED NONE PRSNT: CPT | Mod: 95

## 2025-07-03 PROCEDURE — G2211 COMPLEX E/M VISIT ADD ON: HCPCS | Mod: 95

## 2025-07-03 PROCEDURE — 98007 SYNCH AUDIO-VIDEO EST HI 40: CPT

## 2025-07-03 NOTE — PROGRESS NOTES
Virtual Visit Details    Originating Location (pt. Location): Home    Distant Location (provider location):  On-site  Platform used for Video Visit: [facility]  HEMATOLOGY & ONCOLOGY  FOLLOW UP VISIT    PATIENT NAME: Willie Charles          MRN # [id number]  YOB: 1960  DATE OF VISIT:  Jul 3, 2025            REFERRING PROVIDER:   Mr. Willie Charles was seen at the request of Arely for further evaluation and/or management.     History of Presenting Illness  Mr. Willie Charles is a pleasant 63 year old male with a past medical history significant for hyperthyroisidism s/p radioactive iodine, HTN  and MDS dx in 7/2023 after he had fatigue for several months found to have bicytopenia with WBC 3.3 and hemoglobin 7.0 and platelets 308 that led to a bone marrow biopsy on 7/19/2023 that showed hypocellular marrow with 10 to 20% cellularity and 2% blasts with megakaryocytic hyperplasia with dysplasia.  Cytogenetics showed 5 q. deletion and NGS showed BCORL1 with a VAF of 3%.  He was started on lenalidomide 5 mg daily on 8/11/2023.  His anemia worsened after a few days and he was then started on concurrent azacitidine in addition to Revlimid on 8/28/2023.  As per records his counts trended down by 9/2023 and his Revlimid was discontinued.  He was continued on 2 more cycles of azacitidine until October 2023.  He did see Dr. Squires at [place] in 11/2023 and was recommended to restart Revlimid starting at 2.5 mg dosing every other day for 3 weeks on and 1 week off.  He was started on 2.5 mg 2 times a week  and has been tolerating that for the last 3 months.He has continued to require about 2-4 prbcs in a month.  Denies any fevers or chills or ongoing bleeding from anywhere.    He has now established with the [facility] and has now been increased to lenalidomide 5mg daily for 3 weeks on 1 week off.  He continued on multiple cycles of lenalidomide without improvement in transfusion  requirements and subsequently had a repeat bone marrow biopsy that showed progression with development of complex karyotype.  He was switched to  imetelstat however did not have any significant response and was subsequently started on venetoclax/azacitidine as per [place] trial.    Subjective  Patient is on video while receiving PRBC transfusion at home infusion clinic. Jimi states he is feeling better.     Will occasionally spike a 100.1 fever, last was on 6/29. Taking temperature on his forehead. No recent chills.      He's had some pretty bad cold sores on his lips that are finally starting to clear up. On his lips and tongue. At baseline will occasionally will get cold sores in his mouth but never on lips. He's using magic mouthwash, but is not using it often.     Food is tasting better and he is able to eat more. Yesterday he ate toast and some granola bars, salmon, and rice. He's drinking water, maybe 6 glasses in a day. No sore throat, congestion, or cough.     His constipation is improving. Taking miralax today, last BM was 6/30 or 7/1. No belly pain.     No difficulty breathing. No chest pain or pressure. No nausea/vomiting/diarrhea. No pain or burning w urination. No new pain anywhere. ROS otherwise negative.       Past Medical History  No past medical history on file.    Family History  No family history on file.    Social History  Smoking: Never  Alcohol use: occasionally drinks alcohol  Status:   Children/grandchildren: Did not ask.   Occupation: Housing  , .     [employer]  Current Outpatient Medications  Current Outpatient Medications   Medication Sig Dispense Refill    acetaminophen (TYLENOL) 500 MG tablet Take 500-1,000 mg by mouth every 4 hours as needed for mild pain.      acyclovir (ZOVIRAX) 400 MG tablet Take 1 tablet (400 mg) by mouth 2 times daily. 60 tablet 2    aspirin 81 MG EC tablet Take 81 mg by mouth daily      buPROPion (WELLBUTRIN SR) 150 MG 12 hr tablet Take  150 mg by mouth daily.      cyanocobalamin (VITAMIN B-12) 1000 MCG tablet Take 1,000 mcg by mouth daily      deferiprone (FERRIPROX) 1000 MG TABS tablet TAKE 2 TABLETS (2,000 MG) BY MOUTH THREE TIMES A DAY. (Patient not taking: Reported on 6/5/2025) 180 tablet 0    diltiazem ER COATED BEADS (CARDIZEM CD/CARTIA XT) 180 MG 24 hr capsule Take 180 mg by mouth daily      folic acid (FOLVITE) 1 MG tablet Take 1 tablet (1 mg) by mouth daily. 30 tablet 5    levofloxacin (LEVAQUIN) 500 MG tablet Take 1 tablet (500 mg) by mouth daily. 30 tablet 0    levothyroxine (SYNTHROID/LEVOTHROID) 125 MCG tablet Take 125 mcg by mouth daily.      loratadine (CLARITIN) 10 MG tablet Take 10 mg by mouth daily      metFORMIN (GLUCOPHAGE XR) 500 MG 24 hr tablet Take 500 mg by mouth daily (with dinner)      metoprolol succinate ER (TOPROL XL) 100 MG 24 hr tablet       metoprolol succinate ER (TOPROL XL) 50 MG 24 hr tablet Take 100 mg by mouth daily.      montelukast (SINGULAIR) 10 MG tablet Take 10 mg by mouth at bedtime      ondansetron (ZOFRAN) 8 MG tablet Take 1 tab (8 mg) by mouth prior to azacitidine, then 1 tab every 8 hours as needed for nausea. 30 tablet 5    polyethylene glycol (MIRALAX) 17 g packet Take 1 packet by mouth daily as needed for constipation.      posaconazole (NOXAFIL) 100 MG DR tablet Take 3 tablets (300 mg) by mouth every morning. 90 tablet 1    sildenafil (REVATIO) 20 MG tablet       tamsulosin (FLOMAX) 0.4 MG capsule Take 0.4 mg by mouth. (Patient not taking: Reported on 5/13/2025)      timolol, PF, (TIMOPTIC OCUDOSE) 0.5 % ophthalmic solution Place 1 drop into both eyes daily.      venetoclax (VENCLEXTA) 100 MG tablet Take 1 tablet (100 mg) by mouth daily 14 tablet 0       Allergies  Allergies   Allergen Reactions    Benazepril Other (See Comments)     Critical    Penicillin V Rash       Review of systems  A complete ROS was performed and was negative except as mentioned in HPI    Physical Exam  There were no vitals  filed for this visit.      Wt Readings from Last 3 Encounters:   25 83.9 kg (185 lb)   25 84.4 kg (186 lb)   25 84.7 kg (186 lb 12.8 oz)     ECO   male sitting in chair in NAD  Limited secondary to video visit    Labs and Imaging    Sodium   Date Value Ref Range Status   2025 137 135 - 145 mmol/L Final    ,   Potassium   Date Value Ref Range Status   2025 4.5 3.4 - 5.3 mmol/L Final      Creatinine   Date Value Ref Range Status   2025 1.01 0.67 - 1.17 mg/dL Final       Uric Acid   Date Value Ref Range Status   2025 4.8 3.4 - 7.0 mg/dL Final       WBC Count   Date Value Ref Range Status   2025 2.2 (L) 4.0 - 11.0 10e3/uL Final     Comment:     Preliminary ANC is 1.41   ,   Hemoglobin   Date Value Ref Range Status   2025 7.6 (L) 13.3 - 17.7 g/dL Final   ],   Platelet Count   Date Value Ref Range Status   2025 92 (L) 150 - 450 10e3/uL Final   ,    MCV   Date Value Ref Range Status   2025 82 78 - 100 fL Final         EPO level was 1213 on 2023.        Bone marrow biopsy 2023  FINAL DIAGNOSIS   Peripheral blood, bone marrow aspirate, biopsy and clot sections ([id number]; 2023):     1. Myelodysplastic syndrome with isolated del(5q).     2. Two small monotypic B-cell populations (3% and 11% of total events) of uncertain significance, reportedly   detected by flow cytometric analysis. See comment.     COMMENT   The significance of the small monotypic B-cell populations identified by flow cytometric analysis is uncertain, as   diagnostic features of lymphoma are not seen by morphology or demonstrated by immunohistochemistry. These findings may   represent monoclonal B-cell lymphocytosis, although minimal bone marrow involvement by B-cell lymphoma cannot be   excluded. Clinical and radiographic correlation is recommended.  A lymph node or other extramedullary tissue   biopsy is suggested if lymphoma is clinically and/or  radiographically suspected.   Cytogenetics 5q deletion.   NGS BCORL1 VAF 3%    Bone marrow 2/21/2024  Peripheral blood, bone marrow aspirate, touch imprint, core biopsy, and clot:     -  Pancytopenia.     -  Inadequate bone marrow aspirate, core biopsy and clot sections.      -  The flow cytometry ([id number]) of the bone marrow shows approximately 3% monotypic B-cells positive for CD5, CD19, CD20, CD23,  and lambda, approximately 14% monotypic B-cells positive for CD5 (variable), CD19, CD20 (dim to negative), CD23 (dim), CD38 (parital) and , with no definitive light chain expression, and no increased blast population identified.   The marrow aspirate is hemodilute. The clot sections show no marrow tissue. The biopsy core shows a tiny area (<5% core sections) with a cellular (~10%) bone marrow showing tri-lineage cells including clusters of small hypolobated megakaryocytes. The immunohistochemistry on the biopsy sections appears to show increased megakaryocytes and no definitive diagnostic features of lymphoma involvement. The presence of increased small hypolobated megakaryocytes suggests persistent disease of patient's known MDS with 5q deletion. Chromosome studies show that although 20 metaphases are routinely examined, only 1 was identified and appeared normal.   The significance of the two populations of monotypic B-cells identified by flow cytometry is uncertain, as diagnostic features of lymphoma are not seen by morphology or immunohistochemistry in this inadequate marrow biopsy specimen. These findings may represent monoclonal B-cell lymphocytosis of undetermined significance or involvement of the peripheral blood by a B-cell lymphoma, although bone marrow involvement by B-cell lymphoma cannot be excluded.     Bone marrow biopsy 1/9/2025  - Recurrent/persistent myelodysplastic syndrome with the following features:  - Normocellular marrow for age (variable; overall 30-40%) with trilineage  hematopoiesis, megakaryocytic hyperplasia with dysmegakaryopoiesis, and 3% blasts  RESULTS:     ABNORMAL  - Loss of EGR1 (63.5%)  Two related clones were identified in this specimen.   The first clone is characterized by 46 chromosomes and a deletion of the long arm of one copy of chromosome 5. The second clone contains the same deletion of chromosome 5q with the addition of a translocation between the short arm of chromosome 1 and the long arm of chromosome 11, and a deletion within the long arm of one copy of chromosome 7.  Taken together these findings are consistent with the reported morphologic and immunophenotpic finding of recurrent/persistent myelodysplastic syndrome ([id number]) and indicate cytogenetic progression.   The following patient's previously characterized BCORL1 pathogenic mutation was not identified in the current bone marrow aspirate.  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       MRI liver ferriscan 3/18/2025  FINDINGS:  Average liver iron concentration is 34.4 mg/g dry tissue (normal =  0.17 - 1.8)  Average liver iron concentration is 616 mmol/kg dry tissue (normal = 3  - 33)    Assessment and Plan  Mr. Willie Charles is a 64 year old male who is here for further evaluation and/or management of MDS.    Oncology:  MDS with 5q deletion  WHO Classification: MDS w low blasts and 5q deletion  Date of diagnosis: 7/19/2023  Revised IPSS score: 2.5 Low  Molecular IPSS: -0.42 Moderate low---> mODERATE HIGH  Bone Marrow Blast %: 2  Cytogenetics: 5q del---> 5Q + del 7q + t (1:11) Complex  Molecular: BCORL1 VAF 3%----> None detected except VUS in DNMT3A  Past Treatment: lenalidomide 5mg on 8/11/2023 to 9/2023 with addition of azacitidine since 8/28/2023 until 10/2023.   Current Treatment: Restarted lenalidomide 2.5mg twice a week for 3 weeks on 1 week off since 11/2023. Off since 3/12. Last cycle lenalidomide 10mg starting  8/28. Next cycle lenalidomide 10mg on 9/30. Frank 10mg on 11/4. Frank 10 on 12/30 and Frank 10mg on 1/14    Had an admission 4/18 from 4/22 and stopped lenalidomide on 4/17. Completed 3 weeks on and 1 week of of lenalidomide and restarted on 6/1 for a new cycle. C1D1 for Vargas 14 /aza 7 on 6/2/2025.     Discussed the pathophysiology and natural history of MDS with Mr. Willie NAKITA Charles today. Reviewed the current prognostic models and scoring systems that are used in clinical practice as well as the potential for disease evolution into acute myeloid leukemia. Reviewed the current FDA approved treatments for myelodysplastic syndromes and covered that the only curative option is through an allogeneic hematopoietic cell transplantation. His disease is Moderate low risk and currently BMT is not recommended.     Discussed that MDS with 5 q. responds well to lenalidomide and that he is not received adequate amount of treatment with lenalidomide.  Given that he is tolerating lenalidomide at this very low minimal dose recommended that he increase the lenalidomide to 2.5 mg 5 times a week for 3 weeks on and then 1 week off.  If he continues to tolerate this well we can continue increasing the dose to eventually reach 10 mg 3 weeks on and 1 week off.  He can possibly be started on other agents if his anemia is not responding to lenalidomide including agents like Luspatercept.    He had a recent admission for neutropenic fever s/p antibiotics without blood cultures showing anything.  He did have diffuse ST elevations concerning for stefan/pericarditis.  Course was complicated by A-fib with RVR.  Not sure if the troponin leak was secondary to the RVR.  Though there are rare cases of arrhythmias with lenalidomide doubt if this was related to it.    He has now tolerated a full cycle of lenalidomide 2.5mg daily for 3 weeks and 1 week off.  We increased his lenalidomide to 5mg he has tolerated for 2 cycles with minimal rash that disappeared  on its own without any treatment.     He tolerated the lenalidomide 10 mg for 3 weeks on and 1 week off starting 8/28. He has now finished another cycle that started on 9/30 and another cycle from 11/4   Last ANC was 700 on 11/29 and he has not had any infectious complications. We will delay his next cycle to 12/9. Bone marrow around 1/9. He marrow shows improvement in cellularity to normal levels and less dysplasia, however not directly compared as his previous slides were not read by our pathologists. His cytogenetic studies have now finalized and they show a second clone with complex karyotype containing 5 q. deletion along with 1p, 11 q. and 7 q. Deletion.  This is highly concerning and consistent with the progression.    The complex karyotype worries me.  However the primary symptom that he is continuing to have is transfusion dependence and as we had previously discussed we will start him on imetelstat.  We will give it at least 3 to 4 months, if no response or any other signs of progression we will then switch to venetoclax and azacitidine as per [place] trial. Will also send a referral to one of the BMT physicians to get him established with bone marrow transplant.    He has now been started on imetelstat.  Tolerated this well.  He finally completed the MRI liver/gloria scan is consistent with significantly increased iron deposition in the liver.  Given we have obtained a new baseline. Will start him on deferiprone.  Discussed about possible side effects with deferiprone including agranulocytosis for which we will continue to monitor his blood work twice weekly.  Continue with imetelstat for now.    Sent a referral to BMT to discuss about pros and cons of transplant.  At this point of time given his reevaluation of M-IPSS to moderate high risk given the complex karyotype this would be appropriate.  However patient lives about 4 hours from here at [place] and do want to avoid/postpone transplant for a longer  period of time given transfusion dependence is his primary symptom.    He continued to have significant transfusion dependence 4 units during last month as well.  Given this and in light of new pneumonia since yesterday we will delay/forego his imetelstat infusion.  Will plan to switch to venetoclax and azacitidine based regimen.  I will plan for repeat bone marrow biopsy after 2 cycles of this and then prepare him for transplant.  He has now established with transplant with Dr. Ngo.  We will check coverage for voriconazole versus posaconazole and start him on antifungal agent when we start this treatment.    - Started on venetoclax/azacitidine on 6/2/2025.   - Continue with posaconazole while neutropenic.    -Recent admission for neutropenic fever from 6/16 to 6/19 and was discharged back on levofloxacin after his blood cultures remain negative.    - Counts had not recovered on 6/25 and is continuing to amber. Delayed chemo to 7/7/2025. Counts had not recovered at that time. Will delay treatment again.   - Request for KARLO visit w labs prior to on 7/14 AM w anticipated treatment start on 7/14.      - We will plan to do venetoclax 10 days and azacitidine 5 days.    Neutropenic Precautions:  Discussed again that patient will need blood cultures and IV antibiotics if he develops another neutropenic fever while on oral prophylactic antibiotics and the need to go to an ER in that situation. Neutropenic precautions again reviewed on 7/3. I advised Jimi to use an oral rather than temporal thermometer and to present to ED if developed a temperature of 100.4 or greater. Advised him to call triage and gave him the number again in event that he develops new infectious symptoms but advised that he should go to the ED instead of calling if fever were to develop. Jimi took notes and verbalized his response.     Mucositis:   - Crusting ulcers seen on video visit on lips, deep ulceration seen on tip of tongue.   - Requested an  HSV/VZV swab done at Jimi's local clinic where he was during video visit (low suspicion). Also requested CMP to check electrolytes given poor PO intake. Orders faxed per [facility]  - Discussed salt and baking soda mouth rinse at least 3x daily after meals and provided recipe to Jimi.   - Also reviewed using magic mouthwash prior to eating to help alleviate pain.   - Discussed calling if symptoms worsen.     #Elevated ferritin 1676------> 5372  - STOPPED Deferasirox.   - Given further elevation of ferritin we obtained a quantification of liver iron with MRI-STIR  sequencing ( ferriscan) of liver   Which shows levels of 34 mg/g.  - Will hold deferiprone while he is on posaconazole due to concern for accumulated liver issues.    Plan:  - RTC to clinic with KARLO on 7/7/2025 and start venetoclax/azacitidine the same day  - c/w twice weekly labs and as needed transfusions at local oncologist.  - Continue with acyclovir, levofloxacin and posaconazole .  - HOLD deferiprone now.  - RTC with me after bone marrow biopsy at end of this cycle around 8/13.     Hematology:  Anemia/Thrombocytopenia:   Transfuse leukocyte reduced and irradiated blood products: 1 unit pRBC if hgb is 7.0-7.9, 2 units pRBCs if Hgb 6.0-6.9 g/dl, 1 unit platelets if PLT count is <20,000 or <50,000 with clinical bleeding.    Immunocompromised:  As a result of his disease and/or previous treatment. Mr. Willie Charles is immunocompromised. his last ANC is >500 and there is no need for prophylactic antibiotics at this time.     Cardiac:  Mr. Willie Charles has no history of heart disease.     Renal:  Creatinine results reviewed today. Mr. Willie Charles does not have any renal disease.    Hepatic:  Liver function tests reviewed today.    Psychosocial:  Mr. Willie Charles is coping well with his diagnosis.         The longitudinal plan of care for the diagnosis(es)/condition(s) as documented were addressed during this visit. Due to the added complexity  in care, I will continue to support Willie in the subsequent management and with ongoing continuity of care.    NANCY [facility] [id number] [place], [place] [id number] [phone]

## 2025-07-03 NOTE — PROGRESS NOTES
[facility]: Cancer Care                                                                                          HSV swab and CMP lab orders [facility] to be [facility]     Phone: [phone]  fax for orders (labs):   [phone] - fax failed 3x   Alternate fax number provided [phone] failed x1   Alternate fax number provided [phone] failed x1     Writer called clinic which stated Jimi left for the day already today.   Writer will attempt to fax orders again on Monday morning 7/7.       Signature:  Abby Reyes, RN

## 2025-07-03 NOTE — PROGRESS NOTES
[facility]: Cancer Care                                                                                          Labs show ANC level today still 0.1. Writer reviewed with Dr. Ellis. Will plan to delay treatment again from 7/7 to 7/14. Writer updated Jimi and spouse Monica.   Per Monica Krause has been having mouth sores that are making it difficult for him to eat. Writer recommended they keep the video visit with Marylou CRUZ today and mention mouth sores to her during that visit.   Writer also notified Marylou and Dr. Ellis of mouth sores via secure chat.  Writer also recommended supplementing shakes such as ensure or boost to help Jimi get more calories in if he feels like he has a hard time chewing due to the mouth sores.     Signature:  Abby Reyes RN

## 2025-07-03 NOTE — PROGRESS NOTES
Virtual Visit Details    Type of service:  Video Visit   Video Start Time: 11:43  Video End Time:12:36    Originating Location (pt. Location): Other home clinic    Distant Location (provider location):  On-site  Platform used for Video Visit: Jerod

## 2025-07-03 NOTE — LETTER
7/3/2025      Willie Charles  [address]  [facility] [address]      Dear Colleague,    Thank you for referring your patient, Willie Charles, to the [facility]. Please see a copy of my visit note below.    Virtual Visit Details    Type of service:  Video Visit   Video Start Time: 11:43  Video End Time:12:36    Originating Location (pt. Location): Other home clinic    Distant Location (provider location):  On-site  Platform used for Video Visit: [employer]    Virtual Visit Details    Originating Location (pt. Location): Home    Distant Location (provider location):  On-site  Platform used for Video Visit: [facility]  HEMATOLOGY & ONCOLOGY  FOLLOW UP VISIT    PATIENT NAME: Willie Charles          MRN # [id number]  YOB: 1960  DATE OF VISIT:  Jul 3, 2025            REFERRING PROVIDER:   Mr. Willie Charles was seen at the request of Arely for further evaluation and/or management.     History of Presenting Illness  Mr. Willie Charles is a pleasant 63 year old male with a past medical history significant for hyperthyroisidism s/p radioactive iodine, HTN  and MDS dx in 7/2023 after he had fatigue for several months found to have bicytopenia with WBC 3.3 and hemoglobin 7.0 and platelets 308 that led to a bone marrow biopsy on 7/19/2023 that showed hypocellular marrow with 10 to 20% cellularity and 2% blasts with megakaryocytic hyperplasia with dysplasia.  Cytogenetics showed 5 q. deletion and NGS showed BCORL1 with a VAF of 3%.  He was started on lenalidomide 5 mg daily on 8/11/2023.  His anemia worsened after a few days and he was then started on concurrent azacitidine in addition to Revlimid on 8/28/2023.  As per records his counts trended down by 9/2023 and his Revlimid was discontinued.  He was continued on 2 more cycles of azacitidine until October 2023.  He did see Dr. Squires at [place] in 11/2023 and was recommended to restart Revlimid  starting at 2.5 mg dosing every other day for 3 weeks on and 1 week off.  He was started on 2.5 mg 2 times a week  and has been tolerating that for the last 3 months.He has continued to require about 2-4 prbcs in a month.  Denies any fevers or chills or ongoing bleeding from anywhere.    He has now established with the [facility] and has now been increased to lenalidomide 5mg daily for 3 weeks on 1 week off.  He continued on multiple cycles of lenalidomide without improvement in transfusion requirements and subsequently had a repeat bone marrow biopsy that showed progression with development of complex karyotype.  He was switched to  imetelstat however did not have any significant response and was subsequently started on venetoclax/azacitidine as per [place] trial.    Subjective  Patient is on video while receiving PRBC transfusion at home infusion clinic. Jimi states he is feeling better.     Will occasionally spike a 100.1 fever, last was on 6/29. Taking temperature on his forehead. No recent chills.      He's had some pretty bad cold sores on his lips that are finally starting to clear up. On his lips and tongue. At baseline will occasionally will get cold sores in his mouth but never on lips. He's using magic mouthwash, but is not using it often.     Food is tasting better and he is able to eat more. Yesterday he ate toast and some granola bars, salmon, and rice. He's drinking water, maybe 6 glasses in a day. No sore throat, congestion, or cough.     His constipation is improving. Taking miralax today, last BM was 6/30 or 7/1. No belly pain.     No difficulty breathing. No chest pain or pressure. No nausea/vomiting/diarrhea. No pain or burning w urination. No new pain anywhere. ROS otherwise negative.       Past Medical History  No past medical history on file.    Family History  No family history on file.    Social History  Smoking: Never  Alcohol use: occasionally drinks alcohol  Status:    Children/grandchildren: Did not ask.   Occupation: Housing  , .     [employer]  Current Outpatient Medications  Current Outpatient Medications   Medication Sig Dispense Refill     acetaminophen (TYLENOL) 500 MG tablet Take 500-1,000 mg by mouth every 4 hours as needed for mild pain.       acyclovir (ZOVIRAX) 400 MG tablet Take 1 tablet (400 mg) by mouth 2 times daily. 60 tablet 2     aspirin 81 MG EC tablet Take 81 mg by mouth daily       buPROPion (WELLBUTRIN SR) 150 MG 12 hr tablet Take 150 mg by mouth daily.       cyanocobalamin (VITAMIN B-12) 1000 MCG tablet Take 1,000 mcg by mouth daily       deferiprone (FERRIPROX) 1000 MG TABS tablet TAKE 2 TABLETS (2,000 MG) BY MOUTH THREE TIMES A DAY. (Patient not taking: Reported on 6/5/2025) 180 tablet 0     diltiazem ER COATED BEADS (CARDIZEM CD/CARTIA XT) 180 MG 24 hr capsule Take 180 mg by mouth daily       folic acid (FOLVITE) 1 MG tablet Take 1 tablet (1 mg) by mouth daily. 30 tablet 5     levofloxacin (LEVAQUIN) 500 MG tablet Take 1 tablet (500 mg) by mouth daily. 30 tablet 0     levothyroxine (SYNTHROID/LEVOTHROID) 125 MCG tablet Take 125 mcg by mouth daily.       loratadine (CLARITIN) 10 MG tablet Take 10 mg by mouth daily       metFORMIN (GLUCOPHAGE XR) 500 MG 24 hr tablet Take 500 mg by mouth daily (with dinner)       metoprolol succinate ER (TOPROL XL) 100 MG 24 hr tablet        metoprolol succinate ER (TOPROL XL) 50 MG 24 hr tablet Take 100 mg by mouth daily.       montelukast (SINGULAIR) 10 MG tablet Take 10 mg by mouth at bedtime       ondansetron (ZOFRAN) 8 MG tablet Take 1 tab (8 mg) by mouth prior to azacitidine, then 1 tab every 8 hours as needed for nausea. 30 tablet 5     polyethylene glycol (MIRALAX) 17 g packet Take 1 packet by mouth daily as needed for constipation.       posaconazole (NOXAFIL) 100 MG DR tablet Take 3 tablets (300 mg) by mouth every morning. 90 tablet 1     sildenafil (REVATIO) 20 MG tablet         tamsulosin (FLOMAX) 0.4 MG capsule Take 0.4 mg by mouth. (Patient not taking: Reported on 2025)       timolol, PF, (TIMOPTIC OCUDOSE) 0.5 % ophthalmic solution Place 1 drop into both eyes daily.       venetoclax (VENCLEXTA) 100 MG tablet Take 1 tablet (100 mg) by mouth daily 14 tablet 0       Allergies  Allergies   Allergen Reactions     Benazepril Other (See Comments)     Critical     Penicillin V Rash       Review of systems  A complete ROS was performed and was negative except as mentioned in HPI    Physical Exam  There were no vitals filed for this visit.      Wt Readings from Last 3 Encounters:   25 83.9 kg (185 lb)   25 84.4 kg (186 lb)   25 84.7 kg (186 lb 12.8 oz)     ECO   male sitting in chair in NAD  Limited secondary to video visit    Labs and Imaging    Sodium   Date Value Ref Range Status   2025 137 135 - 145 mmol/L Final    ,   Potassium   Date Value Ref Range Status   2025 4.5 3.4 - 5.3 mmol/L Final      Creatinine   Date Value Ref Range Status   2025 1.01 0.67 - 1.17 mg/dL Final       Uric Acid   Date Value Ref Range Status   2025 4.8 3.4 - 7.0 mg/dL Final       WBC Count   Date Value Ref Range Status   2025 2.2 (L) 4.0 - 11.0 10e3/uL Final     Comment:     Preliminary ANC is 1.41   ,   Hemoglobin   Date Value Ref Range Status   2025 7.6 (L) 13.3 - 17.7 g/dL Final   ],   Platelet Count   Date Value Ref Range Status   2025 92 (L) 150 - 450 10e3/uL Final   ,    MCV   Date Value Ref Range Status   2025 82 78 - 100 fL Final         EPO level was 1213 on 2023.        Bone marrow biopsy 2023  FINAL DIAGNOSIS   Peripheral blood, bone marrow aspirate, biopsy and clot sections ([id number]; 2023):     1. Myelodysplastic syndrome with isolated del(5q).     2. Two small monotypic B-cell populations (3% and 11% of total events) of uncertain significance, reportedly   detected by flow cytometric analysis. See  comment.     COMMENT   The significance of the small monotypic B-cell populations identified by flow cytometric analysis is uncertain, as   diagnostic features of lymphoma are not seen by morphology or demonstrated by immunohistochemistry. These findings may   represent monoclonal B-cell lymphocytosis, although minimal bone marrow involvement by B-cell lymphoma cannot be   excluded. Clinical and radiographic correlation is recommended.  A lymph node or other extramedullary tissue   biopsy is suggested if lymphoma is clinically and/or radiographically suspected.   Cytogenetics 5q deletion.   NGS BCORL1 VAF 3%    Bone marrow 2/21/2024  Peripheral blood, bone marrow aspirate, touch imprint, core biopsy, and clot:     -  Pancytopenia.     -  Inadequate bone marrow aspirate, core biopsy and clot sections.      -  The flow cytometry ([id number]) of the bone marrow shows approximately 3% monotypic B-cells positive for CD5, CD19, CD20, CD23,  and lambda, approximately 14% monotypic B-cells positive for CD5 (variable), CD19, CD20 (dim to negative), CD23 (dim), CD38 (parital) and , with no definitive light chain expression, and no increased blast population identified.   The marrow aspirate is hemodilute. The clot sections show no marrow tissue. The biopsy core shows a tiny area (<5% core sections) with a cellular (~10%) bone marrow showing tri-lineage cells including clusters of small hypolobated megakaryocytes. The immunohistochemistry on the biopsy sections appears to show increased megakaryocytes and no definitive diagnostic features of lymphoma involvement. The presence of increased small hypolobated megakaryocytes suggests persistent disease of patient's known MDS with 5q deletion. Chromosome studies show that although 20 metaphases are routinely examined, only 1 was identified and appeared normal.   The significance of the two populations of monotypic B-cells identified by flow cytometry is uncertain, as  diagnostic features of lymphoma are not seen by morphology or immunohistochemistry in this inadequate marrow biopsy specimen. These findings may represent monoclonal B-cell lymphocytosis of undetermined significance or involvement of the peripheral blood by a B-cell lymphoma, although bone marrow involvement by B-cell lymphoma cannot be excluded.     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   The first clone is characterized by 46 chromosomes and a deletion of the long arm of one copy of chromosome 5. The second clone contains the same deletion of chromosome 5q with the addition of a translocation between the short arm of chromosome 1 and the long arm of chromosome 11, and a deletion within the long arm of one copy of chromosome 7.  Taken together these findings are consistent with the reported morphologic and immunophenotpic finding of recurrent/persistent myelodysplastic syndrome ([id number]) and indicate cytogenetic progression.   The following patient's previously characterized BCORL1 pathogenic mutation was not identified in the current bone marrow aspirate.  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       MRI liver ferriscan 3/18/2025  FINDINGS:  Average liver iron concentration is 34.4 mg/g dry tissue (normal =  0.17 - 1.8)  Average liver iron concentration is 616 mmol/kg dry tissue (normal = 3  - 33)    Assessment and Plan  Mr. Willie Charles is a 64 year old male who is here for further evaluation and/or management of MDS.    Oncology:  MDS with 5q deletion  WHO Classification: MDS w low blasts and 5q  deletion  Date of diagnosis: 7/19/2023  Revised IPSS score: 2.5 Low  Molecular IPSS: -0.42 Moderate low---> mODERATE HIGH  Bone Marrow Blast %: 2  Cytogenetics: 5q del---> 5Q + del 7q + t (1:11) Complex  Molecular: BCORL1 VAF 3%----> None detected except VUS in DNMT3A  Past Treatment: lenalidomide 5mg on 8/11/2023 to 9/2023 with addition of azacitidine since 8/28/2023 until 10/2023.   Current Treatment: Restarted lenalidomide 2.5mg twice a week for 3 weeks on 1 week off since 11/2023. Off since 3/12. Last cycle lenalidomide 10mg starting 8/28. Next cycle lenalidomide 10mg on 9/30. Frank 10mg on 11/4. Frank 10 on 12/30 and Frank 10mg on 1/14    Had an admission 4/18 from 4/22 and stopped lenalidomide on 4/17. Completed 3 weeks on and 1 week of of lenalidomide and restarted on 6/1 for a new cycle. C1D1 for Vargas 14 /aza 7 on 6/2/2025.     Discussed the pathophysiology and natural history of MDS with Mr. Willie Charles today. Reviewed the current prognostic models and scoring systems that are used in clinical practice as well as the potential for disease evolution into acute myeloid leukemia. Reviewed the current FDA approved treatments for myelodysplastic syndromes and covered that the only curative option is through an allogeneic hematopoietic cell transplantation. His disease is Moderate low risk and currently BMT is not recommended.     Discussed that MDS with 5 q. responds well to lenalidomide and that he is not received adequate amount of treatment with lenalidomide.  Given that he is tolerating lenalidomide at this very low minimal dose recommended that he increase the lenalidomide to 2.5 mg 5 times a week for 3 weeks on and then 1 week off.  If he continues to tolerate this well we can continue increasing the dose to eventually reach 10 mg 3 weeks on and 1 week off.  He can possibly be started on other agents if his anemia is not responding to lenalidomide including agents like Luspatercept.    He had a recent  admission for neutropenic fever s/p antibiotics without blood cultures showing anything.  He did have diffuse ST elevations concerning for stefan/pericarditis.  Course was complicated by A-fib with RVR.  Not sure if the troponin leak was secondary to the RVR.  Though there are rare cases of arrhythmias with lenalidomide doubt if this was related to it.    He has now tolerated a full cycle of lenalidomide 2.5mg daily for 3 weeks and 1 week off.  We increased his lenalidomide to 5mg he has tolerated for 2 cycles with minimal rash that disappeared on its own without any treatment.     He tolerated the lenalidomide 10 mg for 3 weeks on and 1 week off starting 8/28. He has now finished another cycle that started on 9/30 and another cycle from 11/4   Last ANC was 700 on 11/29 and he has not had any infectious complications. We will delay his next cycle to 12/9. Bone marrow around 1/9. He marrow shows improvement in cellularity to normal levels and less dysplasia, however not directly compared as his previous slides were not read by our pathologists. His cytogenetic studies have now finalized and they show a second clone with complex karyotype containing 5 q. deletion along with 1p, 11 q. and 7 q. Deletion.  This is highly concerning and consistent with the progression.    The complex karyotype worries me.  However the primary symptom that he is continuing to have is transfusion dependence and as we had previously discussed we will start him on imetelstat.  We will give it at least 3 to 4 months, if no response or any other signs of progression we will then switch to venetoclax and azacitidine as per [place] trial. Will also send a referral to one of the BMT physicians to get him established with bone marrow transplant.    He has now been started on imetelstat.  Tolerated this well.  He finally completed the MRI liver/gloria scan is consistent with significantly increased iron deposition in the liver.  Given we have obtained a  new baseline. Will start him on deferiprone.  Discussed about possible side effects with deferiprone including agranulocytosis for which we will continue to monitor his blood work twice weekly.  Continue with imetelstat for now.    Sent a referral to BMT to discuss about pros and cons of transplant.  At this point of time given his reevaluation of M-IPSS to moderate high risk given the complex karyotype this would be appropriate.  However patient lives about 4 hours from here at [place] and do want to avoid/postpone transplant for a longer period of time given transfusion dependence is his primary symptom.    He continued to have significant transfusion dependence 4 units during last month as well.  Given this and in light of new pneumonia since yesterday we will delay/forego his imetelstat infusion.  Will plan to switch to venetoclax and azacitidine based regimen.  I will plan for repeat bone marrow biopsy after 2 cycles of this and then prepare him for transplant.  He has now established with transplant with Dr. Ngo.  We will check coverage for voriconazole versus posaconazole and start him on antifungal agent when we start this treatment.    - Started on venetoclax/azacitidine on 6/2/2025.   - Continue with posaconazole while neutropenic.    -Recent admission for neutropenic fever from 6/16 to 6/19 and was discharged back on levofloxacin after his blood cultures remain negative.    - Counts had not recovered on 6/25 and is continuing to amber. Delayed chemo to 7/7/2025. Counts had not recovered at that time. Will delay treatment again.   - Request for KARLO visit w labs prior to on 7/14 AM w anticipated treatment start on 7/14.      - We will plan to do venetoclax 10 days and azacitidine 5 days.    Neutropenic Precautions:  Discussed again that patient will need blood cultures and IV antibiotics if he develops another neutropenic fever while on oral prophylactic antibiotics and the need to go to an ER in that  situation. Neutropenic precautions again reviewed on 7/3. I advised Jimi to use an oral rather than temporal thermometer and to present to ED if developed a temperature of 100.4 or greater. Advised him to call triage and gave him the number again in event that he develops new infectious symptoms but advised that he should go to the ED instead of calling if fever were to develop. Jimi took notes and verbalized his response.     Mucositis:   - Crusting ulcers seen on video visit on lips, deep ulceration seen on tip of tongue.   - Requested an HSV/VZV swab done at Jimi's local clinic where he was during video visit (low suspicion). Also requested CMP to check electrolytes given poor PO intake. Orders faxed per [facility]  - Discussed salt and baking soda mouth rinse at least 3x daily after meals and provided recipe to Jimi.   - Also reviewed using magic mouthwash prior to eating to help alleviate pain.   - Discussed calling if symptoms worsen.     #Elevated ferritin 1676------> 5372  - STOPPED Deferasirox.   - Given further elevation of ferritin we obtained a quantification of liver iron with MRI-STIR  sequencing ( ferriscan) of liver   Which shows levels of 34 mg/g.  - Will hold deferiprone while he is on posaconazole due to concern for accumulated liver issues.    Plan:  - RTC to clinic with KARLO on 7/7/2025 and start venetoclax/azacitidine the same day  - c/w twice weekly labs and as needed transfusions at local oncologist.  - Continue with acyclovir, levofloxacin and posaconazole .  - HOLD deferiprone now.  - RTC with me after bone marrow biopsy at end of this cycle around 8/13.     Hematology:  Anemia/Thrombocytopenia:   Transfuse leukocyte reduced and irradiated blood products: 1 unit pRBC if hgb is 7.0-7.9, 2 units pRBCs if Hgb 6.0-6.9 g/dl, 1 unit platelets if PLT count is <20,000 or <50,000 with clinical bleeding.    Immunocompromised:  As a result of his disease and/or previous treatment. Mr. Willie MACE Melvin is  immunocompromised. his last ANC is >500 and there is no need for prophylactic antibiotics at this time.     Cardiac:  Mr. Willie Charles has no history of heart disease.     Renal:  Creatinine results reviewed today. Mr. Willie Charles does not have any renal disease.    Hepatic:  Liver function tests reviewed today.    Psychosocial:  Mr. Willie Charles is coping well with his diagnosis.         The longitudinal plan of care for the diagnosis(es)/condition(s) as documented were addressed during this visit. Due to the added complexity in care, I will continue to support Willie in the subsequent management and with ongoing continuity of care.    NANCY Up CNP  [facility] [address] [address] [phone]      Again, thank you for allowing me to participate in the care of your patient.        Sincerely,        NANCY Hull CNP    Electronically signed

## 2025-07-03 NOTE — NURSING NOTE
Current patient location: [facility]     Is the patient currently in the state of [place]? YES    Visit mode: VIDEO    If the visit is dropped, the patient can be reconnected by:VIDEO VISIT: Text to cell phone:   Telephone Information:   Mobile [phone]       Will anyone else be joining the visit? NO  (If patient encounters technical issues they should call [phone])    Are changes needed to the allergy or medication list? Pt stated no changes to allergies and Pt stated no med changes    Are refills needed on medications prescribed by this physician? NO    Rooming Documentation:  Questionnaire(s) completed    Reason for visit: RECHECK    Jazmin LOPEZ

## 2025-07-07 ENCOUNTER — PATIENT OUTREACH (OUTPATIENT)
Dept: ONCOLOGY | Facility: CLINIC | Age: 65
End: 2025-07-07
Payer: COMMERCIAL

## 2025-07-07 NOTE — PROGRESS NOTES
[facility]: Cancer Care                                                                                          HSV and CMP orders faxed to [facility].         Signature:  Abby Reyes RN

## 2025-07-08 DIAGNOSIS — Z11.59 ENCOUNTER FOR SCREENING FOR OTHER VIRAL DISEASES: Primary | ICD-10-CM

## 2025-07-08 DIAGNOSIS — D46.C MDS (MYELODYSPLASTIC SYNDROME) WITH 5Q DELETION (H): ICD-10-CM

## 2025-07-10 NOTE — CONFIDENTIAL NOTE
DIAGNOSIS:  MDS (myelodysplastic syndrome) with 5q deletion (H)    Appt Date:  07.11.2025     NOTES STATUS DETAILS   OFFICE NOTE from referring provider Internal 05.13.2025  Stevie Ngo DO    OFFICE NOTES from other specialists Internal 07.03.2025 Milagro Juárez APRN CNP     06.25.2025  Haroon Ellis MD     More in Epic   MEDICATION LIST Internal    LABS     HEPATIC PANEL (LIVER PANEL) Internal 04.16.2025   BASIC METABOLIC PANEL Care Everywhere 06.19.2025   COMPLETE METABOLIC PANEL Care Everywhere 07.07.2025   COMPLETE BLOOD COUNT (CBC) Care Everywhere 07.07.2025

## 2025-07-11 ENCOUNTER — PRE VISIT (OUTPATIENT)
Dept: GASTROENTEROLOGY | Facility: CLINIC | Age: 65
End: 2025-07-11
Payer: COMMERCIAL

## 2025-07-14 ENCOUNTER — INFUSION THERAPY VISIT (OUTPATIENT)
Dept: INFUSION THERAPY | Facility: HOSPITAL | Age: 65
End: 2025-07-14
Payer: COMMERCIAL

## 2025-07-14 VITALS
DIASTOLIC BLOOD PRESSURE: 65 MMHG | HEART RATE: 70 BPM | TEMPERATURE: 97.6 F | OXYGEN SATURATION: 98 % | SYSTOLIC BLOOD PRESSURE: 124 MMHG | RESPIRATION RATE: 16 BRPM

## 2025-07-14 DIAGNOSIS — D46.C MDS (MYELODYSPLASTIC SYNDROME) WITH 5Q DELETION (H): Primary | ICD-10-CM

## 2025-07-14 LAB
ABO + RH BLD: NORMAL
ALBUMIN SERPL BCG-MCNC: 3.4 G/DL (ref 3.5–5.2)
ALP SERPL-CCNC: 97 U/L (ref 40–150)
ALT SERPL W P-5'-P-CCNC: 101 U/L (ref 0–70)
ANION GAP SERPL CALCULATED.3IONS-SCNC: 6 MMOL/L (ref 7–15)
AST SERPL W P-5'-P-CCNC: 44 U/L (ref 0–45)
BASOPHILS # BLD MANUAL: 0 10E3/UL (ref 0–0.2)
BASOPHILS NFR BLD MANUAL: 0 %
BILIRUB SERPL-MCNC: 0.2 MG/DL
BLD GP AB SCN SERPL QL: NEGATIVE
BUN SERPL-MCNC: 18.5 MG/DL (ref 8–23)
CALCIUM SERPL-MCNC: 9.2 MG/DL (ref 8.8–10.4)
CHLORIDE SERPL-SCNC: 107 MMOL/L (ref 98–107)
CREAT SERPL-MCNC: 0.76 MG/DL (ref 0.67–1.17)
EGFRCR SERPLBLD CKD-EPI 2021: >90 ML/MIN/1.73M2
EOSINOPHIL # BLD MANUAL: 0 10E3/UL (ref 0–0.7)
EOSINOPHIL NFR BLD MANUAL: 0 %
ERYTHROCYTE [DISTWIDTH] IN BLOOD BY AUTOMATED COUNT: 14.4 % (ref 10–15)
GLUCOSE SERPL-MCNC: 161 MG/DL (ref 70–99)
HCO3 SERPL-SCNC: 28 MMOL/L (ref 22–29)
HCT VFR BLD AUTO: 23.6 % (ref 40–53)
HGB BLD-MCNC: 7.7 G/DL (ref 13.3–17.7)
LYMPHOCYTES # BLD MANUAL: 0.7 10E3/UL (ref 0.8–5.3)
LYMPHOCYTES NFR BLD MANUAL: 48 %
MCH RBC QN AUTO: 27.1 PG (ref 26.5–33)
MCHC RBC AUTO-ENTMCNC: 32.6 G/DL (ref 31.5–36.5)
MCV RBC AUTO: 83 FL (ref 78–100)
METAMYELOCYTES # BLD MANUAL: 0 10E3/UL
METAMYELOCYTES NFR BLD MANUAL: 1 %
MONOCYTES # BLD MANUAL: 0.1 10E3/UL (ref 0–1.3)
MONOCYTES NFR BLD MANUAL: 6 %
NEUTROPHILS # BLD MANUAL: 0.7 10E3/UL (ref 1.6–8.3)
NEUTROPHILS NFR BLD MANUAL: 45 %
PHOSPHATE SERPL-MCNC: 3 MG/DL (ref 2.5–4.5)
PLAT MORPH BLD: NORMAL
PLATELET # BLD AUTO: 100 10E3/UL (ref 150–450)
POTASSIUM SERPL-SCNC: 4.1 MMOL/L (ref 3.4–5.3)
PROT SERPL-MCNC: 6.5 G/DL (ref 6.4–8.3)
RBC # BLD AUTO: 2.84 10E6/UL (ref 4.4–5.9)
RBC MORPH BLD: NORMAL
SODIUM SERPL-SCNC: 141 MMOL/L (ref 135–145)
SPECIMEN EXP DATE BLD: NORMAL
URATE SERPL-MCNC: 4.1 MG/DL (ref 3.4–7)
WBC # BLD AUTO: 1.5 10E3/UL (ref 4–11)

## 2025-07-14 PROCEDURE — 85007 BL SMEAR W/DIFF WBC COUNT: CPT | Performed by: STUDENT IN AN ORGANIZED HEALTH CARE EDUCATION/TRAINING PROGRAM

## 2025-07-14 PROCEDURE — 96401 CHEMO ANTI-NEOPL SQ/IM: CPT

## 2025-07-14 PROCEDURE — 85014 HEMATOCRIT: CPT | Performed by: STUDENT IN AN ORGANIZED HEALTH CARE EDUCATION/TRAINING PROGRAM

## 2025-07-14 PROCEDURE — 80069 RENAL FUNCTION PANEL: CPT | Performed by: STUDENT IN AN ORGANIZED HEALTH CARE EDUCATION/TRAINING PROGRAM

## 2025-07-14 PROCEDURE — 82310 ASSAY OF CALCIUM: CPT | Performed by: STUDENT IN AN ORGANIZED HEALTH CARE EDUCATION/TRAINING PROGRAM

## 2025-07-14 PROCEDURE — 250N000011 HC RX IP 250 OP 636: Performed by: STUDENT IN AN ORGANIZED HEALTH CARE EDUCATION/TRAINING PROGRAM

## 2025-07-14 PROCEDURE — 84550 ASSAY OF BLOOD/URIC ACID: CPT | Performed by: STUDENT IN AN ORGANIZED HEALTH CARE EDUCATION/TRAINING PROGRAM

## 2025-07-14 PROCEDURE — 36415 COLL VENOUS BLD VENIPUNCTURE: CPT | Performed by: STUDENT IN AN ORGANIZED HEALTH CARE EDUCATION/TRAINING PROGRAM

## 2025-07-14 PROCEDURE — 86900 BLOOD TYPING SEROLOGIC ABO: CPT | Performed by: STUDENT IN AN ORGANIZED HEALTH CARE EDUCATION/TRAINING PROGRAM

## 2025-07-14 RX ORDER — DIPHENHYDRAMINE HYDROCHLORIDE 50 MG/ML
50 INJECTION, SOLUTION INTRAMUSCULAR; INTRAVENOUS
Status: CANCELLED
Start: 2025-07-14

## 2025-07-14 RX ORDER — EPINEPHRINE 1 MG/ML
0.3 INJECTION, SOLUTION INTRAMUSCULAR; SUBCUTANEOUS EVERY 5 MIN PRN
Status: CANCELLED | OUTPATIENT
Start: 2025-07-14

## 2025-07-14 RX ORDER — HEPARIN SODIUM,PORCINE 10 UNIT/ML
5-20 VIAL (ML) INTRAVENOUS DAILY PRN
Status: CANCELLED | OUTPATIENT
Start: 2025-07-14

## 2025-07-14 RX ORDER — DIPHENHYDRAMINE HCL 50 MG
50 CAPSULE ORAL SEE ADMIN INSTRUCTIONS
Status: CANCELLED
Start: 2025-07-14

## 2025-07-14 RX ORDER — HEPARIN SODIUM (PORCINE) LOCK FLUSH IV SOLN 100 UNIT/ML 100 UNIT/ML
5 SOLUTION INTRAVENOUS
Status: CANCELLED | OUTPATIENT
Start: 2025-07-14

## 2025-07-14 RX ORDER — HEPARIN SODIUM (PORCINE) LOCK FLUSH IV SOLN 100 UNIT/ML 100 UNIT/ML
5 SOLUTION INTRAVENOUS
Status: DISCONTINUED | OUTPATIENT
Start: 2025-07-14 | End: 2025-07-14 | Stop reason: HOSPADM

## 2025-07-14 RX ORDER — ACETAMINOPHEN 325 MG/1
650 TABLET ORAL SEE ADMIN INSTRUCTIONS
Status: CANCELLED
Start: 2025-07-14

## 2025-07-14 RX ADMIN — AZACITIDINE 150 MG: 100 INJECTION, POWDER, LYOPHILIZED, FOR SOLUTION INTRAVENOUS; SUBCUTANEOUS at 13:38

## 2025-07-14 RX ADMIN — Medication 5 ML: at 13:37

## 2025-07-14 NOTE — PROGRESS NOTES
Infusion Nursing Note:  Willie Charles presents today for D1C2.    Patient seen by provider today: No   present during visit today: Not Applicable.    Note: 2 injection given RIGHT lower abdomen. 1 needle clogged so he was poked 3 times. Port flushed and deaccessed upon completion. Per blood therapy plan pt will get 1 unit(s) PRBC's tomorrow. Pt verbalizes understanding of treatment plsn.      Intravenous Access:  Implanted Port.    Treatment Conditions:  Results reviewed, labs MET treatment parameters, ok to proceed with treatment.  Labs reviewed with Dr Caleb rios to treat with ANC 0.7.      Post Infusion Assessment:  Patient tolerated injection without incident.       Discharge Plan:   Patient discharged in stable condition accompanied by: wife.  Departure Mode: Ambulatory.      Erika Good RN

## 2025-07-15 ENCOUNTER — INFUSION THERAPY VISIT (OUTPATIENT)
Dept: INFUSION THERAPY | Facility: HOSPITAL | Age: 65
End: 2025-07-15
Payer: COMMERCIAL

## 2025-07-15 VITALS
TEMPERATURE: 98.9 F | DIASTOLIC BLOOD PRESSURE: 61 MMHG | OXYGEN SATURATION: 97 % | HEART RATE: 75 BPM | SYSTOLIC BLOOD PRESSURE: 116 MMHG | RESPIRATION RATE: 18 BRPM

## 2025-07-15 DIAGNOSIS — D46.C MDS (MYELODYSPLASTIC SYNDROME) WITH 5Q DELETION (H): Primary | ICD-10-CM

## 2025-07-15 LAB
BLD PROD TYP BPU: NORMAL
BLOOD COMPONENT TYPE: NORMAL
CODING SYSTEM: NORMAL
CROSSMATCH: NORMAL
ISSUE DATE AND TIME: NORMAL
UNIT ABO/RH: NORMAL
UNIT NUMBER: NORMAL
UNIT STATUS: NORMAL
UNIT TYPE ISBT: 9500

## 2025-07-15 PROCEDURE — 36430 TRANSFUSION BLD/BLD COMPNT: CPT

## 2025-07-15 PROCEDURE — 86923 COMPATIBILITY TEST ELECTRIC: CPT | Performed by: STUDENT IN AN ORGANIZED HEALTH CARE EDUCATION/TRAINING PROGRAM

## 2025-07-15 PROCEDURE — 96401 CHEMO ANTI-NEOPL SQ/IM: CPT

## 2025-07-15 PROCEDURE — P9040 RBC LEUKOREDUCED IRRADIATED: HCPCS | Performed by: STUDENT IN AN ORGANIZED HEALTH CARE EDUCATION/TRAINING PROGRAM

## 2025-07-15 PROCEDURE — 250N000013 HC RX MED GY IP 250 OP 250 PS 637: Performed by: STUDENT IN AN ORGANIZED HEALTH CARE EDUCATION/TRAINING PROGRAM

## 2025-07-15 PROCEDURE — 258N000003 HC RX IP 258 OP 636: Performed by: STUDENT IN AN ORGANIZED HEALTH CARE EDUCATION/TRAINING PROGRAM

## 2025-07-15 PROCEDURE — 250N000011 HC RX IP 250 OP 636: Performed by: STUDENT IN AN ORGANIZED HEALTH CARE EDUCATION/TRAINING PROGRAM

## 2025-07-15 RX ORDER — ACETAMINOPHEN 325 MG/1
650 TABLET ORAL SEE ADMIN INSTRUCTIONS
Status: DISCONTINUED | OUTPATIENT
Start: 2025-07-15 | End: 2025-07-15 | Stop reason: HOSPADM

## 2025-07-15 RX ORDER — DIPHENHYDRAMINE HCL 50 MG
50 CAPSULE ORAL SEE ADMIN INSTRUCTIONS
Status: DISCONTINUED | OUTPATIENT
Start: 2025-07-15 | End: 2025-07-15 | Stop reason: HOSPADM

## 2025-07-15 RX ORDER — ACETAMINOPHEN 325 MG/1
650 TABLET ORAL SEE ADMIN INSTRUCTIONS
Status: CANCELLED
Start: 2025-07-15

## 2025-07-15 RX ORDER — HEPARIN SODIUM (PORCINE) LOCK FLUSH IV SOLN 100 UNIT/ML 100 UNIT/ML
5 SOLUTION INTRAVENOUS
Status: DISCONTINUED | OUTPATIENT
Start: 2025-07-15 | End: 2025-07-15 | Stop reason: HOSPADM

## 2025-07-15 RX ORDER — HEPARIN SODIUM,PORCINE 10 UNIT/ML
5-20 VIAL (ML) INTRAVENOUS DAILY PRN
Status: CANCELLED | OUTPATIENT
Start: 2025-07-15

## 2025-07-15 RX ORDER — DIPHENHYDRAMINE HCL 50 MG
50 CAPSULE ORAL SEE ADMIN INSTRUCTIONS
Status: CANCELLED
Start: 2025-07-15

## 2025-07-15 RX ORDER — DIPHENHYDRAMINE HYDROCHLORIDE 50 MG/ML
50 INJECTION, SOLUTION INTRAMUSCULAR; INTRAVENOUS
Status: CANCELLED
Start: 2025-07-15

## 2025-07-15 RX ORDER — EPINEPHRINE 1 MG/ML
0.3 INJECTION, SOLUTION INTRAMUSCULAR; SUBCUTANEOUS EVERY 5 MIN PRN
Status: CANCELLED | OUTPATIENT
Start: 2025-07-15

## 2025-07-15 RX ORDER — HEPARIN SODIUM (PORCINE) LOCK FLUSH IV SOLN 100 UNIT/ML 100 UNIT/ML
5 SOLUTION INTRAVENOUS
Status: CANCELLED | OUTPATIENT
Start: 2025-07-15

## 2025-07-15 RX ADMIN — SODIUM CHLORIDE 250 ML: 0.9 INJECTION, SOLUTION INTRAVENOUS at 13:14

## 2025-07-15 RX ADMIN — DIPHENHYDRAMINE HYDROCHLORIDE 50 MG: 50 CAPSULE ORAL at 12:56

## 2025-07-15 RX ADMIN — AZACITIDINE 150 MG: 100 INJECTION, POWDER, LYOPHILIZED, FOR SOLUTION INTRAVENOUS; SUBCUTANEOUS at 13:14

## 2025-07-15 RX ADMIN — Medication 5 ML: at 14:46

## 2025-07-15 RX ADMIN — ACETAMINOPHEN 650 MG: 325 TABLET ORAL at 12:56

## 2025-07-15 NOTE — PROGRESS NOTES
Infusion Nursing Note:  Willie NAKITA Charles presents today for D2C2.    Patient seen by provider today: No   present during visit today: Not Applicable.    Note: Pt received 1 unit PRBC's today without incident. Vidaza was administered in 2 separate injection LEFT abdomen. [place] would like labs checked again and possibly get a transfusion if needed before weekend.      Intravenous Access:  Implanted Port.    Treatment Conditions:  Not Applicable.      Post Infusion Assessment:  Patient tolerated infusion without incident.  Patient tolerated injection without incident.       Discharge Plan:   Patient discharged in stable condition accompanied by: wife.  Departure Mode: Ambulatory.      Erika Good RN

## 2025-07-16 ENCOUNTER — INFUSION THERAPY VISIT (OUTPATIENT)
Dept: INFUSION THERAPY | Facility: HOSPITAL | Age: 65
End: 2025-07-16
Payer: COMMERCIAL

## 2025-07-16 VITALS
RESPIRATION RATE: 16 BRPM | SYSTOLIC BLOOD PRESSURE: 143 MMHG | TEMPERATURE: 98.1 F | OXYGEN SATURATION: 97 % | DIASTOLIC BLOOD PRESSURE: 80 MMHG | HEART RATE: 84 BPM

## 2025-07-16 DIAGNOSIS — D46.C MDS (MYELODYSPLASTIC SYNDROME) WITH 5Q DELETION (H): Primary | ICD-10-CM

## 2025-07-16 PROCEDURE — 250N000011 HC RX IP 250 OP 636: Performed by: STUDENT IN AN ORGANIZED HEALTH CARE EDUCATION/TRAINING PROGRAM

## 2025-07-16 RX ORDER — ACETAMINOPHEN 325 MG/1
650 TABLET ORAL SEE ADMIN INSTRUCTIONS
Start: 2025-07-16

## 2025-07-16 RX ORDER — ACETAMINOPHEN 325 MG/1
650 TABLET ORAL SEE ADMIN INSTRUCTIONS
Status: DISCONTINUED | OUTPATIENT
Start: 2025-07-16 | End: 2025-07-16

## 2025-07-16 RX ORDER — DIPHENHYDRAMINE HYDROCHLORIDE 50 MG/ML
50 INJECTION, SOLUTION INTRAMUSCULAR; INTRAVENOUS
Start: 2025-07-16

## 2025-07-16 RX ORDER — EPINEPHRINE 1 MG/ML
0.3 INJECTION, SOLUTION INTRAMUSCULAR; SUBCUTANEOUS EVERY 5 MIN PRN
OUTPATIENT
Start: 2025-07-16

## 2025-07-16 RX ORDER — HEPARIN SODIUM (PORCINE) LOCK FLUSH IV SOLN 100 UNIT/ML 100 UNIT/ML
5 SOLUTION INTRAVENOUS
Status: DISCONTINUED | OUTPATIENT
Start: 2025-07-16 | End: 2025-07-16 | Stop reason: HOSPADM

## 2025-07-16 RX ORDER — HEPARIN SODIUM (PORCINE) LOCK FLUSH IV SOLN 100 UNIT/ML 100 UNIT/ML
5 SOLUTION INTRAVENOUS
OUTPATIENT
Start: 2025-07-16

## 2025-07-16 RX ORDER — DIPHENHYDRAMINE HCL 50 MG
50 CAPSULE ORAL SEE ADMIN INSTRUCTIONS
Start: 2025-07-16

## 2025-07-16 RX ORDER — DIPHENHYDRAMINE HCL 50 MG
50 CAPSULE ORAL SEE ADMIN INSTRUCTIONS
Status: DISCONTINUED | OUTPATIENT
Start: 2025-07-16 | End: 2025-07-16

## 2025-07-16 RX ORDER — HEPARIN SODIUM,PORCINE 10 UNIT/ML
5-20 VIAL (ML) INTRAVENOUS DAILY PRN
OUTPATIENT
Start: 2025-07-16

## 2025-07-16 RX ADMIN — AZACITIDINE 150 MG: 100 INJECTION, POWDER, LYOPHILIZED, FOR SOLUTION INTRAVENOUS; SUBCUTANEOUS at 12:10

## 2025-07-16 NOTE — PROGRESS NOTES
Infusion Nursing Note:  Willie Charles presents today for Vidaza.    Patient seen by provider today: No   present during visit today: Not Applicable.    Note: Pt received 1 unit of PRBC's yesterday and felt improvement. Pt will have CBC done tomorrow for poss blood on Friday.      Intravenous Access:  No Intravenous access/labs at this visit.    Treatment Conditions:  Not Applicable.      Post Infusion Assessment:  Patient tolerated injection without incident. Given sub Q into right side of abd.      Discharge Plan:   Patient and/or family verbalized understanding of discharge instructions and all questions answered.  Patient discharged in stable condition accompanied by: wife.  Departure Mode: Ambulatory.      Ban López RN

## 2025-07-17 ENCOUNTER — LAB (OUTPATIENT)
Dept: INFUSION THERAPY | Facility: HOSPITAL | Age: 65
End: 2025-07-17
Payer: COMMERCIAL

## 2025-07-17 ENCOUNTER — INFUSION THERAPY VISIT (OUTPATIENT)
Dept: INFUSION THERAPY | Facility: HOSPITAL | Age: 65
End: 2025-07-17
Payer: COMMERCIAL

## 2025-07-17 VITALS
OXYGEN SATURATION: 99 % | TEMPERATURE: 98 F | DIASTOLIC BLOOD PRESSURE: 74 MMHG | HEART RATE: 80 BPM | RESPIRATION RATE: 18 BRPM | SYSTOLIC BLOOD PRESSURE: 137 MMHG

## 2025-07-17 DIAGNOSIS — D46.C MDS (MYELODYSPLASTIC SYNDROME) WITH 5Q DELETION (H): Primary | ICD-10-CM

## 2025-07-17 LAB
BASOPHILS # BLD AUTO: 0 10E3/UL (ref 0–0.2)
BASOPHILS NFR BLD AUTO: 0 %
ELLIPTOCYTES BLD QL SMEAR: SLIGHT
EOSINOPHIL # BLD AUTO: 0 10E3/UL (ref 0–0.7)
EOSINOPHIL NFR BLD AUTO: 0 %
ERYTHROCYTE [DISTWIDTH] IN BLOOD BY AUTOMATED COUNT: 15.1 % (ref 10–15)
HCT VFR BLD AUTO: 26.2 % (ref 40–53)
HGB BLD-MCNC: 8.8 G/DL (ref 13.3–17.7)
IMM GRANULOCYTES # BLD: 0 10E3/UL
IMM GRANULOCYTES NFR BLD: 2 %
LYMPHOCYTES # BLD AUTO: 0.7 10E3/UL (ref 0.8–5.3)
LYMPHOCYTES NFR BLD AUTO: 42 %
MCH RBC QN AUTO: 27.4 PG (ref 26.5–33)
MCHC RBC AUTO-ENTMCNC: 33.6 G/DL (ref 31.5–36.5)
MCV RBC AUTO: 82 FL (ref 78–100)
MONOCYTES # BLD AUTO: 0.3 10E3/UL (ref 0–1.3)
MONOCYTES NFR BLD AUTO: 15 %
NEUTROPHILS # BLD AUTO: 0.7 10E3/UL (ref 1.6–8.3)
NEUTROPHILS NFR BLD AUTO: 41 %
NRBC # BLD AUTO: 0 10E3/UL
NRBC BLD AUTO-RTO: 0 /100
PLAT MORPH BLD: ABNORMAL
PLATELET # BLD AUTO: 110 10E3/UL (ref 150–450)
RBC # BLD AUTO: 3.21 10E6/UL (ref 4.4–5.9)
RBC MORPH BLD: ABNORMAL
VARIANT LYMPHS BLD QL SMEAR: PRESENT
WBC # BLD AUTO: 1.7 10E3/UL (ref 4–11)

## 2025-07-17 PROCEDURE — 36415 COLL VENOUS BLD VENIPUNCTURE: CPT | Performed by: STUDENT IN AN ORGANIZED HEALTH CARE EDUCATION/TRAINING PROGRAM

## 2025-07-17 PROCEDURE — 85025 COMPLETE CBC W/AUTO DIFF WBC: CPT | Performed by: STUDENT IN AN ORGANIZED HEALTH CARE EDUCATION/TRAINING PROGRAM

## 2025-07-17 PROCEDURE — 250N000011 HC RX IP 250 OP 636: Performed by: STUDENT IN AN ORGANIZED HEALTH CARE EDUCATION/TRAINING PROGRAM

## 2025-07-17 RX ORDER — HEPARIN SODIUM (PORCINE) LOCK FLUSH IV SOLN 100 UNIT/ML 100 UNIT/ML
5 SOLUTION INTRAVENOUS
OUTPATIENT
Start: 2025-07-17

## 2025-07-17 RX ORDER — DIPHENHYDRAMINE HCL 50 MG
50 CAPSULE ORAL SEE ADMIN INSTRUCTIONS
Start: 2025-07-17

## 2025-07-17 RX ORDER — EPINEPHRINE 1 MG/ML
0.3 INJECTION, SOLUTION INTRAMUSCULAR; SUBCUTANEOUS EVERY 5 MIN PRN
OUTPATIENT
Start: 2025-07-17

## 2025-07-17 RX ORDER — ACETAMINOPHEN 325 MG/1
650 TABLET ORAL SEE ADMIN INSTRUCTIONS
Start: 2025-07-17

## 2025-07-17 RX ORDER — DIPHENHYDRAMINE HYDROCHLORIDE 50 MG/ML
50 INJECTION, SOLUTION INTRAMUSCULAR; INTRAVENOUS
Start: 2025-07-17

## 2025-07-17 RX ORDER — HEPARIN SODIUM,PORCINE 10 UNIT/ML
5-20 VIAL (ML) INTRAVENOUS DAILY PRN
OUTPATIENT
Start: 2025-07-17

## 2025-07-17 RX ADMIN — AZACITIDINE 150 MG: 100 INJECTION, POWDER, LYOPHILIZED, FOR SOLUTION INTRAVENOUS; SUBCUTANEOUS at 11:52

## 2025-07-17 NOTE — PROGRESS NOTES
Infusion Nursing Note:  Willie MACE Maiagladys presents today for cycle 2 day 5.    Patient seen by provider today: No   present during visit today: Not Applicable.    Note: Pt here ambulatory for txt. Pt states takes zofran before injection at home then in the afternoon repeats to prevent nausea and vomiting. Hgb today 8.8. Vidaza reviewed and administered in left medial abdomen/Bandaids to sites. PT to return tomorrow at 10 am since needs to drive back home afterwards. PT dc'd steady gait with wife.      Intravenous Access:  No iv access needed today.    Treatment Conditions:  Results reviewed, labs MET treatment parameters, ok to proceed with treatment.      Post Infusion Assessment:  Patient tolerated injection without incident.       Discharge Plan:   Follow up reviewed.      Tierra Rhoades RN

## 2025-07-28 ENCOUNTER — MYC MEDICAL ADVICE (OUTPATIENT)
Dept: INTERVENTIONAL RADIOLOGY/VASCULAR | Facility: CLINIC | Age: 65
End: 2025-07-28
Payer: COMMERCIAL

## 2025-08-04 DIAGNOSIS — D46.C MDS (MYELODYSPLASTIC SYNDROME) WITH 5Q DELETION (H): Primary | ICD-10-CM

## 2025-08-05 ENCOUNTER — APPOINTMENT (OUTPATIENT)
Dept: INTERVENTIONAL RADIOLOGY/VASCULAR | Facility: CLINIC | Age: 65
End: 2025-08-05
Attending: INTERNAL MEDICINE
Payer: COMMERCIAL

## 2025-08-05 ENCOUNTER — APPOINTMENT (OUTPATIENT)
Dept: MEDSURG UNIT | Facility: CLINIC | Age: 65
End: 2025-08-05
Attending: RADIOLOGY
Payer: COMMERCIAL

## 2025-08-05 ENCOUNTER — HOSPITAL ENCOUNTER (OUTPATIENT)
Facility: CLINIC | Age: 65
Discharge: HOME | End: 2025-08-05
Attending: RADIOLOGY | Admitting: RADIOLOGY
Payer: COMMERCIAL

## 2025-08-05 VITALS
HEART RATE: 65 BPM | OXYGEN SATURATION: 99 % | BODY MASS INDEX: 26.95 KG/M2 | DIASTOLIC BLOOD PRESSURE: 72 MMHG | WEIGHT: 177.25 LBS | SYSTOLIC BLOOD PRESSURE: 131 MMHG | TEMPERATURE: 97.4 F | RESPIRATION RATE: 18 BRPM

## 2025-08-05 DIAGNOSIS — E83.10 DISORDER OF IRON METABOLISM, UNSPECIFIED: ICD-10-CM

## 2025-08-05 DIAGNOSIS — E88.01 ALPHA-1-ANTITRYPSIN DEFICIENCY (H): ICD-10-CM

## 2025-08-05 LAB
ERYTHROCYTE [DISTWIDTH] IN BLOOD BY AUTOMATED COUNT: 14.5 % (ref 10–15)
HCT VFR BLD AUTO: 23.1 % (ref 40–53)
HGB BLD-MCNC: 7.6 G/DL (ref 13.3–17.7)
INR BLD: 1.3 (ref 0.9–1.3)
INR PPP: 1.17 (ref 0.85–1.15)
MCH RBC QN AUTO: 27.2 PG (ref 26.5–33)
MCHC RBC AUTO-ENTMCNC: 32.9 G/DL (ref 31.5–36.5)
MCV RBC AUTO: 83 FL (ref 78–100)
PLATELET # BLD AUTO: 64 10E3/UL (ref 150–450)
PROTHROMBIN TIME: 14.9 SECONDS (ref 11.8–14.8)
RBC # BLD AUTO: 2.79 10E6/UL (ref 4.4–5.9)
WBC # BLD AUTO: 1.1 10E3/UL (ref 4–11)

## 2025-08-05 PROCEDURE — 999N000142 HC STATISTIC PROCEDURE PREP ONLY

## 2025-08-05 PROCEDURE — 76942 ECHO GUIDE FOR BIOPSY: CPT | Mod: 26 | Performed by: PHYSICIAN ASSISTANT

## 2025-08-05 PROCEDURE — 85018 HEMOGLOBIN: CPT

## 2025-08-05 PROCEDURE — 999N000133 HC STATISTIC PP CARE STAGE 2

## 2025-08-05 PROCEDURE — 88307 TISSUE EXAM BY PATHOLOGIST: CPT | Mod: 26 | Performed by: STUDENT IN AN ORGANIZED HEALTH CARE EDUCATION/TRAINING PROGRAM

## 2025-08-05 PROCEDURE — 88313 SPECIAL STAINS GROUP 2: CPT | Mod: 26 | Performed by: STUDENT IN AN ORGANIZED HEALTH CARE EDUCATION/TRAINING PROGRAM

## 2025-08-05 PROCEDURE — 36415 COLL VENOUS BLD VENIPUNCTURE: CPT

## 2025-08-05 PROCEDURE — 99152 MOD SED SAME PHYS/QHP 5/>YRS: CPT

## 2025-08-05 PROCEDURE — 85610 PROTHROMBIN TIME: CPT

## 2025-08-05 PROCEDURE — 47000 NEEDLE BIOPSY OF LIVER PERQ: CPT | Performed by: PHYSICIAN ASSISTANT

## 2025-08-05 PROCEDURE — 88313 SPECIAL STAINS GROUP 2: CPT | Mod: TC | Performed by: INTERNAL MEDICINE

## 2025-08-05 PROCEDURE — 250N000011 HC RX IP 250 OP 636

## 2025-08-05 PROCEDURE — 250N000011 HC RX IP 250 OP 636: Performed by: RADIOLOGY

## 2025-08-05 PROCEDURE — 250N000009 HC RX 250

## 2025-08-05 PROCEDURE — 85610 PROTHROMBIN TIME: CPT | Performed by: PHYSICIAN ASSISTANT

## 2025-08-05 RX ORDER — FLUMAZENIL 0.1 MG/ML
0.2 INJECTION, SOLUTION INTRAVENOUS
Status: DISCONTINUED | OUTPATIENT
Start: 2025-08-05 | End: 2025-08-05 | Stop reason: HOSPADM

## 2025-08-05 RX ORDER — NALOXONE HYDROCHLORIDE 0.4 MG/ML
0.2 INJECTION, SOLUTION INTRAMUSCULAR; INTRAVENOUS; SUBCUTANEOUS
Status: DISCONTINUED | OUTPATIENT
Start: 2025-08-05 | End: 2025-08-05 | Stop reason: HOSPADM

## 2025-08-05 RX ORDER — NALOXONE HYDROCHLORIDE 0.4 MG/ML
0.4 INJECTION, SOLUTION INTRAMUSCULAR; INTRAVENOUS; SUBCUTANEOUS
Status: DISCONTINUED | OUTPATIENT
Start: 2025-08-05 | End: 2025-08-05 | Stop reason: HOSPADM

## 2025-08-05 RX ORDER — FENTANYL CITRATE 50 UG/ML
25-50 INJECTION, SOLUTION INTRAMUSCULAR; INTRAVENOUS EVERY 5 MIN PRN
Refills: 0 | Status: DISCONTINUED | OUTPATIENT
Start: 2025-08-05 | End: 2025-08-05 | Stop reason: HOSPADM

## 2025-08-05 RX ORDER — LIDOCAINE 40 MG/G
CREAM TOPICAL
Status: DISCONTINUED | OUTPATIENT
Start: 2025-08-05 | End: 2025-08-05 | Stop reason: HOSPADM

## 2025-08-05 RX ORDER — HEPARIN SODIUM (PORCINE) LOCK FLUSH IV SOLN 100 UNIT/ML 100 UNIT/ML
5 SOLUTION INTRAVENOUS ONCE
Status: COMPLETED | OUTPATIENT
Start: 2025-08-05 | End: 2025-08-05

## 2025-08-05 RX ADMIN — MIDAZOLAM 0.5 MG: 1 INJECTION INTRAMUSCULAR; INTRAVENOUS at 09:17

## 2025-08-05 RX ADMIN — Medication 5 ML: at 11:37

## 2025-08-05 RX ADMIN — FENTANYL CITRATE 25 MCG: 50 INJECTION INTRAMUSCULAR; INTRAVENOUS at 09:17

## 2025-08-05 RX ADMIN — LIDOCAINE HYDROCHLORIDE 5 ML: 10 INJECTION, SOLUTION EPIDURAL; INFILTRATION; INTRACAUDAL; PERINEURAL at 09:25

## 2025-08-05 ASSESSMENT — ACTIVITIES OF DAILY LIVING (ADL)
ADLS_ACUITY_SCORE: 41

## 2025-08-06 RX ORDER — METHYLPREDNISOLONE SODIUM SUCCINATE 40 MG/ML
40 INJECTION INTRAMUSCULAR; INTRAVENOUS
Status: CANCELLED
Start: 2025-08-19

## 2025-08-06 RX ORDER — DIPHENHYDRAMINE HYDROCHLORIDE 50 MG/ML
50 INJECTION, SOLUTION INTRAMUSCULAR; INTRAVENOUS
Status: CANCELLED
Start: 2025-08-18

## 2025-08-06 RX ORDER — HEPARIN SODIUM,PORCINE 10 UNIT/ML
5-20 VIAL (ML) INTRAVENOUS DAILY PRN
Status: CANCELLED | OUTPATIENT
Start: 2025-08-19

## 2025-08-06 RX ORDER — DIPHENHYDRAMINE HYDROCHLORIDE 50 MG/ML
50 INJECTION, SOLUTION INTRAMUSCULAR; INTRAVENOUS
Start: 2025-08-14

## 2025-08-06 RX ORDER — ALBUTEROL SULFATE 0.83 MG/ML
2.5 SOLUTION RESPIRATORY (INHALATION)
Status: CANCELLED | OUTPATIENT
Start: 2025-08-18

## 2025-08-06 RX ORDER — EPINEPHRINE 1 MG/ML
0.3 INJECTION, SOLUTION INTRAMUSCULAR; SUBCUTANEOUS EVERY 5 MIN PRN
OUTPATIENT
Start: 2025-08-12

## 2025-08-06 RX ORDER — MEPERIDINE HYDROCHLORIDE 25 MG/ML
25 INJECTION INTRAMUSCULAR; INTRAVENOUS; SUBCUTANEOUS
OUTPATIENT
Start: 2025-08-12

## 2025-08-06 RX ORDER — ALBUTEROL SULFATE 90 UG/1
1-2 INHALANT RESPIRATORY (INHALATION)
Start: 2025-08-12

## 2025-08-06 RX ORDER — METHYLPREDNISOLONE SODIUM SUCCINATE 40 MG/ML
40 INJECTION INTRAMUSCULAR; INTRAVENOUS
Start: 2025-08-14

## 2025-08-06 RX ORDER — HEPARIN SODIUM (PORCINE) LOCK FLUSH IV SOLN 100 UNIT/ML 100 UNIT/ML
5 SOLUTION INTRAVENOUS
OUTPATIENT
Start: 2025-08-13

## 2025-08-06 RX ORDER — DIPHENHYDRAMINE HYDROCHLORIDE 50 MG/ML
25 INJECTION, SOLUTION INTRAMUSCULAR; INTRAVENOUS
Start: 2025-08-14

## 2025-08-06 RX ORDER — ALBUTEROL SULFATE 0.83 MG/ML
2.5 SOLUTION RESPIRATORY (INHALATION)
OUTPATIENT
Start: 2025-08-13

## 2025-08-06 RX ORDER — HEPARIN SODIUM (PORCINE) LOCK FLUSH IV SOLN 100 UNIT/ML 100 UNIT/ML
5 SOLUTION INTRAVENOUS
OUTPATIENT
Start: 2025-08-14

## 2025-08-06 RX ORDER — DIPHENHYDRAMINE HYDROCHLORIDE 50 MG/ML
50 INJECTION, SOLUTION INTRAMUSCULAR; INTRAVENOUS
Start: 2025-08-11

## 2025-08-06 RX ORDER — LORAZEPAM 2 MG/ML
0.5 INJECTION INTRAMUSCULAR EVERY 4 HOURS PRN
OUTPATIENT
Start: 2025-08-14

## 2025-08-06 RX ORDER — METHYLPREDNISOLONE SODIUM SUCCINATE 40 MG/ML
40 INJECTION INTRAMUSCULAR; INTRAVENOUS
Start: 2025-08-11

## 2025-08-06 RX ORDER — ALBUTEROL SULFATE 0.83 MG/ML
2.5 SOLUTION RESPIRATORY (INHALATION)
OUTPATIENT
Start: 2025-08-14

## 2025-08-06 RX ORDER — METHYLPREDNISOLONE SODIUM SUCCINATE 40 MG/ML
40 INJECTION INTRAMUSCULAR; INTRAVENOUS
Start: 2025-08-13

## 2025-08-06 RX ORDER — DIPHENHYDRAMINE HYDROCHLORIDE 50 MG/ML
25 INJECTION, SOLUTION INTRAMUSCULAR; INTRAVENOUS
Start: 2025-08-11

## 2025-08-06 RX ORDER — HEPARIN SODIUM,PORCINE 10 UNIT/ML
5-20 VIAL (ML) INTRAVENOUS DAILY PRN
OUTPATIENT
Start: 2025-08-12

## 2025-08-06 RX ORDER — HEPARIN SODIUM,PORCINE 10 UNIT/ML
5-20 VIAL (ML) INTRAVENOUS DAILY PRN
OUTPATIENT
Start: 2025-08-15

## 2025-08-06 RX ORDER — DIPHENHYDRAMINE HYDROCHLORIDE 50 MG/ML
25 INJECTION, SOLUTION INTRAMUSCULAR; INTRAVENOUS
Status: CANCELLED
Start: 2025-08-19

## 2025-08-06 RX ORDER — LORAZEPAM 2 MG/ML
0.5 INJECTION INTRAMUSCULAR EVERY 4 HOURS PRN
OUTPATIENT
Start: 2025-08-15

## 2025-08-06 RX ORDER — DIPHENHYDRAMINE HYDROCHLORIDE 50 MG/ML
50 INJECTION, SOLUTION INTRAMUSCULAR; INTRAVENOUS
Start: 2025-08-15

## 2025-08-06 RX ORDER — EPINEPHRINE 1 MG/ML
0.3 INJECTION, SOLUTION INTRAMUSCULAR; SUBCUTANEOUS EVERY 5 MIN PRN
OUTPATIENT
Start: 2025-08-13

## 2025-08-06 RX ORDER — METHYLPREDNISOLONE SODIUM SUCCINATE 40 MG/ML
40 INJECTION INTRAMUSCULAR; INTRAVENOUS
Status: CANCELLED
Start: 2025-08-18

## 2025-08-06 RX ORDER — ALBUTEROL SULFATE 0.83 MG/ML
2.5 SOLUTION RESPIRATORY (INHALATION)
OUTPATIENT
Start: 2025-08-11

## 2025-08-06 RX ORDER — DIPHENHYDRAMINE HYDROCHLORIDE 50 MG/ML
50 INJECTION, SOLUTION INTRAMUSCULAR; INTRAVENOUS
Start: 2025-08-12

## 2025-08-06 RX ORDER — HEPARIN SODIUM,PORCINE 10 UNIT/ML
5-20 VIAL (ML) INTRAVENOUS DAILY PRN
OUTPATIENT
Start: 2025-08-11

## 2025-08-06 RX ORDER — MEPERIDINE HYDROCHLORIDE 25 MG/ML
25 INJECTION INTRAMUSCULAR; INTRAVENOUS; SUBCUTANEOUS
Status: CANCELLED | OUTPATIENT
Start: 2025-08-18

## 2025-08-06 RX ORDER — ALBUTEROL SULFATE 90 UG/1
1-2 INHALANT RESPIRATORY (INHALATION)
Status: CANCELLED
Start: 2025-08-18

## 2025-08-06 RX ORDER — DIPHENHYDRAMINE HYDROCHLORIDE 50 MG/ML
50 INJECTION, SOLUTION INTRAMUSCULAR; INTRAVENOUS
Status: CANCELLED
Start: 2025-08-19

## 2025-08-06 RX ORDER — LORAZEPAM 2 MG/ML
0.5 INJECTION INTRAMUSCULAR EVERY 4 HOURS PRN
Status: CANCELLED | OUTPATIENT
Start: 2025-08-19

## 2025-08-06 RX ORDER — DIPHENHYDRAMINE HYDROCHLORIDE 50 MG/ML
25 INJECTION, SOLUTION INTRAMUSCULAR; INTRAVENOUS
Start: 2025-08-13

## 2025-08-06 RX ORDER — EPINEPHRINE 1 MG/ML
0.3 INJECTION, SOLUTION INTRAMUSCULAR; SUBCUTANEOUS EVERY 5 MIN PRN
Status: CANCELLED | OUTPATIENT
Start: 2025-08-19

## 2025-08-06 RX ORDER — EPINEPHRINE 1 MG/ML
0.3 INJECTION, SOLUTION INTRAMUSCULAR; SUBCUTANEOUS EVERY 5 MIN PRN
Status: CANCELLED | OUTPATIENT
Start: 2025-08-18

## 2025-08-06 RX ORDER — HEPARIN SODIUM,PORCINE 10 UNIT/ML
5-20 VIAL (ML) INTRAVENOUS DAILY PRN
Status: CANCELLED | OUTPATIENT
Start: 2025-08-18

## 2025-08-06 RX ORDER — MEPERIDINE HYDROCHLORIDE 25 MG/ML
25 INJECTION INTRAMUSCULAR; INTRAVENOUS; SUBCUTANEOUS
OUTPATIENT
Start: 2025-08-11

## 2025-08-06 RX ORDER — HEPARIN SODIUM (PORCINE) LOCK FLUSH IV SOLN 100 UNIT/ML 100 UNIT/ML
5 SOLUTION INTRAVENOUS
OUTPATIENT
Start: 2025-08-12

## 2025-08-06 RX ORDER — HEPARIN SODIUM (PORCINE) LOCK FLUSH IV SOLN 100 UNIT/ML 100 UNIT/ML
5 SOLUTION INTRAVENOUS
Status: CANCELLED | OUTPATIENT
Start: 2025-08-19

## 2025-08-06 RX ORDER — HEPARIN SODIUM (PORCINE) LOCK FLUSH IV SOLN 100 UNIT/ML 100 UNIT/ML
5 SOLUTION INTRAVENOUS
OUTPATIENT
Start: 2025-08-15

## 2025-08-06 RX ORDER — EPINEPHRINE 1 MG/ML
0.3 INJECTION, SOLUTION INTRAMUSCULAR; SUBCUTANEOUS EVERY 5 MIN PRN
OUTPATIENT
Start: 2025-08-11

## 2025-08-06 RX ORDER — METHYLPREDNISOLONE SODIUM SUCCINATE 40 MG/ML
40 INJECTION INTRAMUSCULAR; INTRAVENOUS
Start: 2025-08-15

## 2025-08-06 RX ORDER — HEPARIN SODIUM (PORCINE) LOCK FLUSH IV SOLN 100 UNIT/ML 100 UNIT/ML
5 SOLUTION INTRAVENOUS
Status: CANCELLED | OUTPATIENT
Start: 2025-08-18

## 2025-08-06 RX ORDER — MEPERIDINE HYDROCHLORIDE 25 MG/ML
25 INJECTION INTRAMUSCULAR; INTRAVENOUS; SUBCUTANEOUS
OUTPATIENT
Start: 2025-08-13

## 2025-08-06 RX ORDER — EPINEPHRINE 1 MG/ML
0.3 INJECTION, SOLUTION INTRAMUSCULAR; SUBCUTANEOUS EVERY 5 MIN PRN
OUTPATIENT
Start: 2025-08-14

## 2025-08-06 RX ORDER — ALBUTEROL SULFATE 0.83 MG/ML
2.5 SOLUTION RESPIRATORY (INHALATION)
OUTPATIENT
Start: 2025-08-15

## 2025-08-06 RX ORDER — DIPHENHYDRAMINE HYDROCHLORIDE 50 MG/ML
25 INJECTION, SOLUTION INTRAMUSCULAR; INTRAVENOUS
Start: 2025-08-12

## 2025-08-06 RX ORDER — DIPHENHYDRAMINE HYDROCHLORIDE 50 MG/ML
25 INJECTION, SOLUTION INTRAMUSCULAR; INTRAVENOUS
Start: 2025-08-15

## 2025-08-06 RX ORDER — LORAZEPAM 2 MG/ML
0.5 INJECTION INTRAMUSCULAR EVERY 4 HOURS PRN
OUTPATIENT
Start: 2025-08-11

## 2025-08-06 RX ORDER — ALBUTEROL SULFATE 90 UG/1
1-2 INHALANT RESPIRATORY (INHALATION)
Start: 2025-08-14

## 2025-08-06 RX ORDER — ALBUTEROL SULFATE 0.83 MG/ML
2.5 SOLUTION RESPIRATORY (INHALATION)
Status: CANCELLED | OUTPATIENT
Start: 2025-08-19

## 2025-08-06 RX ORDER — LORAZEPAM 2 MG/ML
0.5 INJECTION INTRAMUSCULAR EVERY 4 HOURS PRN
OUTPATIENT
Start: 2025-08-12

## 2025-08-06 RX ORDER — MEPERIDINE HYDROCHLORIDE 25 MG/ML
25 INJECTION INTRAMUSCULAR; INTRAVENOUS; SUBCUTANEOUS
OUTPATIENT
Start: 2025-08-15

## 2025-08-06 RX ORDER — DIPHENHYDRAMINE HYDROCHLORIDE 50 MG/ML
25 INJECTION, SOLUTION INTRAMUSCULAR; INTRAVENOUS
Status: CANCELLED
Start: 2025-08-18

## 2025-08-06 RX ORDER — ALBUTEROL SULFATE 90 UG/1
1-2 INHALANT RESPIRATORY (INHALATION)
Start: 2025-08-15

## 2025-08-06 RX ORDER — EPINEPHRINE 1 MG/ML
0.3 INJECTION, SOLUTION INTRAMUSCULAR; SUBCUTANEOUS EVERY 5 MIN PRN
OUTPATIENT
Start: 2025-08-15

## 2025-08-06 RX ORDER — HEPARIN SODIUM,PORCINE 10 UNIT/ML
5-20 VIAL (ML) INTRAVENOUS DAILY PRN
OUTPATIENT
Start: 2025-08-14

## 2025-08-06 RX ORDER — ALBUTEROL SULFATE 90 UG/1
1-2 INHALANT RESPIRATORY (INHALATION)
Start: 2025-08-13

## 2025-08-06 RX ORDER — ALBUTEROL SULFATE 90 UG/1
1-2 INHALANT RESPIRATORY (INHALATION)
Status: CANCELLED
Start: 2025-08-19

## 2025-08-06 RX ORDER — ALBUTEROL SULFATE 0.83 MG/ML
2.5 SOLUTION RESPIRATORY (INHALATION)
OUTPATIENT
Start: 2025-08-12

## 2025-08-06 RX ORDER — METHYLPREDNISOLONE SODIUM SUCCINATE 40 MG/ML
40 INJECTION INTRAMUSCULAR; INTRAVENOUS
Start: 2025-08-12

## 2025-08-06 RX ORDER — ALBUTEROL SULFATE 90 UG/1
1-2 INHALANT RESPIRATORY (INHALATION)
Start: 2025-08-11

## 2025-08-06 RX ORDER — DIPHENHYDRAMINE HYDROCHLORIDE 50 MG/ML
50 INJECTION, SOLUTION INTRAMUSCULAR; INTRAVENOUS
Start: 2025-08-13

## 2025-08-06 RX ORDER — HEPARIN SODIUM (PORCINE) LOCK FLUSH IV SOLN 100 UNIT/ML 100 UNIT/ML
5 SOLUTION INTRAVENOUS
OUTPATIENT
Start: 2025-08-11

## 2025-08-06 RX ORDER — HEPARIN SODIUM,PORCINE 10 UNIT/ML
5-20 VIAL (ML) INTRAVENOUS DAILY PRN
OUTPATIENT
Start: 2025-08-13

## 2025-08-06 RX ORDER — LORAZEPAM 2 MG/ML
0.5 INJECTION INTRAMUSCULAR EVERY 4 HOURS PRN
OUTPATIENT
Start: 2025-08-13

## 2025-08-06 RX ORDER — MEPERIDINE HYDROCHLORIDE 25 MG/ML
25 INJECTION INTRAMUSCULAR; INTRAVENOUS; SUBCUTANEOUS
Status: CANCELLED | OUTPATIENT
Start: 2025-08-19

## 2025-08-06 RX ORDER — LORAZEPAM 2 MG/ML
0.5 INJECTION INTRAMUSCULAR EVERY 4 HOURS PRN
Status: CANCELLED | OUTPATIENT
Start: 2025-08-18

## 2025-08-06 RX ORDER — MEPERIDINE HYDROCHLORIDE 25 MG/ML
25 INJECTION INTRAMUSCULAR; INTRAVENOUS; SUBCUTANEOUS
OUTPATIENT
Start: 2025-08-14

## 2025-08-07 LAB
PATH REPORT.COMMENTS IMP SPEC: NORMAL
PATH REPORT.FINAL DX SPEC: NORMAL
PATH REPORT.GROSS SPEC: NORMAL
PATH REPORT.MICROSCOPIC SPEC OTHER STN: NORMAL
PATH REPORT.RELEVANT HX SPEC: NORMAL
PHOTO IMAGE: NORMAL

## 2025-08-13 LAB
ABO + RH BLD: NORMAL
BLD GP AB SCN SERPL QL: NEGATIVE
BLD PROD TYP BPU: NORMAL
BLD PROD TYP BPU: NORMAL
BLOOD COMPONENT TYPE: NORMAL
BLOOD COMPONENT TYPE: NORMAL
CODING SYSTEM: NORMAL
CODING SYSTEM: NORMAL
SPECIMEN EXP DATE BLD: NORMAL
UNIT ABO/RH: NORMAL
UNIT NUMBER: NORMAL
UNIT NUMBER: NORMAL
UNIT STATUS: NORMAL
UNIT STATUS: NORMAL
UNIT TYPE ISBT: 8400

## 2025-08-13 RX ORDER — ACETAMINOPHEN 325 MG/1
650 TABLET ORAL SEE ADMIN INSTRUCTIONS
Start: 2025-08-13

## 2025-08-13 RX ORDER — HEPARIN SODIUM (PORCINE) LOCK FLUSH IV SOLN 100 UNIT/ML 100 UNIT/ML
5 SOLUTION INTRAVENOUS
OUTPATIENT
Start: 2025-08-13

## 2025-08-13 RX ORDER — DIPHENHYDRAMINE HCL 25 MG
50 CAPSULE ORAL SEE ADMIN INSTRUCTIONS
Start: 2025-08-13

## 2025-08-13 RX ORDER — EPINEPHRINE 1 MG/ML
0.3 INJECTION, SOLUTION INTRAMUSCULAR; SUBCUTANEOUS EVERY 5 MIN PRN
OUTPATIENT
Start: 2025-08-13

## 2025-08-13 RX ORDER — HEPARIN SODIUM,PORCINE 10 UNIT/ML
5-20 VIAL (ML) INTRAVENOUS DAILY PRN
OUTPATIENT
Start: 2025-08-13

## 2025-08-13 RX ORDER — DIPHENHYDRAMINE HYDROCHLORIDE 50 MG/ML
50 INJECTION, SOLUTION INTRAMUSCULAR; INTRAVENOUS
Start: 2025-08-13

## 2025-08-14 ENCOUNTER — INFUSION THERAPY VISIT (OUTPATIENT)
Dept: ONCOLOGY | Facility: CLINIC | Age: 65
End: 2025-08-14
Attending: STUDENT IN AN ORGANIZED HEALTH CARE EDUCATION/TRAINING PROGRAM
Payer: COMMERCIAL

## 2025-08-14 ENCOUNTER — APPOINTMENT (OUTPATIENT)
Dept: LAB | Facility: CLINIC | Age: 65
End: 2025-08-14
Attending: STUDENT IN AN ORGANIZED HEALTH CARE EDUCATION/TRAINING PROGRAM
Payer: COMMERCIAL

## 2025-08-14 VITALS
WEIGHT: 179 LBS | OXYGEN SATURATION: 98 % | HEART RATE: 80 BPM | TEMPERATURE: 98.6 F | SYSTOLIC BLOOD PRESSURE: 157 MMHG | BODY MASS INDEX: 27.22 KG/M2 | DIASTOLIC BLOOD PRESSURE: 80 MMHG | RESPIRATION RATE: 16 BRPM

## 2025-08-14 DIAGNOSIS — D46.C MDS (MYELODYSPLASTIC SYNDROME) WITH 5Q DELETION (H): Primary | ICD-10-CM

## 2025-08-14 DIAGNOSIS — Z11.59 ENCOUNTER FOR SCREENING FOR OTHER VIRAL DISEASES: ICD-10-CM

## 2025-08-14 DIAGNOSIS — E83.10 DISORDER OF IRON METABOLISM, UNSPECIFIED: ICD-10-CM

## 2025-08-14 DIAGNOSIS — E88.01 ALPHA-1-ANTITRYPSIN DEFICIENCY (H): ICD-10-CM

## 2025-08-14 DIAGNOSIS — Z79.899 ENCOUNTER FOR LONG-TERM (CURRENT) USE OF HIGH-RISK MEDICATION: ICD-10-CM

## 2025-08-14 LAB
ALBUMIN SERPL BCG-MCNC: 3.7 G/DL (ref 3.5–5.2)
ALP SERPL-CCNC: 113 U/L (ref 40–150)
ALT SERPL W P-5'-P-CCNC: 113 U/L (ref 0–70)
ANION GAP SERPL CALCULATED.3IONS-SCNC: 11 MMOL/L (ref 7–15)
AST SERPL W P-5'-P-CCNC: 55 U/L (ref 0–45)
BASOPHILS # BLD AUTO: <0.03 10E3/UL (ref 0–0.2)
BASOPHILS NFR BLD AUTO: 0 %
BILIRUB SERPL-MCNC: 0.4 MG/DL
BUN SERPL-MCNC: 17.6 MG/DL (ref 8–23)
CALCIUM SERPL-MCNC: 9.2 MG/DL (ref 8.8–10.4)
CHLORIDE SERPL-SCNC: 103 MMOL/L (ref 98–107)
CREAT SERPL-MCNC: 0.74 MG/DL (ref 0.67–1.17)
EGFRCR SERPLBLD CKD-EPI 2021: >90 ML/MIN/1.73M2
ELLIPTOCYTES BLD QL SMEAR: SLIGHT
EOSINOPHIL # BLD AUTO: <0.03 10E3/UL (ref 0–0.7)
EOSINOPHIL NFR BLD AUTO: 0 %
ERYTHROCYTE [DISTWIDTH] IN BLOOD BY AUTOMATED COUNT: 14.9 % (ref 10–15)
FERRITIN SERPL-MCNC: 7094 NG/ML (ref 31–409)
GLUCOSE SERPL-MCNC: 276 MG/DL (ref 70–99)
HAV AB SER QL IA: NONREACTIVE
HCO3 SERPL-SCNC: 24 MMOL/L (ref 22–29)
HCT VFR BLD AUTO: 24.5 % (ref 40–53)
HGB BLD-MCNC: 8.1 G/DL (ref 13.3–17.7)
IMM GRANULOCYTES # BLD: <0.03 10E3/UL
IMM GRANULOCYTES NFR BLD: 0 %
IRON BINDING CAPACITY (ROCHE): ABNORMAL
IRON SATN MFR SERPL: ABNORMAL %
IRON SERPL-MCNC: 181 UG/DL (ref 61–157)
LYMPHOCYTES # BLD AUTO: 0.47 10E3/UL (ref 0.8–5.3)
LYMPHOCYTES NFR BLD AUTO: 43.1 %
MCH RBC QN AUTO: 26.7 PG (ref 26.5–33)
MCHC RBC AUTO-ENTMCNC: 33.1 G/DL (ref 31.5–36.5)
MCV RBC AUTO: 80.9 FL (ref 78–100)
MONOCYTES # BLD AUTO: 0.16 10E3/UL (ref 0–1.3)
MONOCYTES NFR BLD AUTO: 14.7 %
NEUTROPHILS # BLD AUTO: 0.46 10E3/UL (ref 1.6–8.3)
NEUTROPHILS NFR BLD AUTO: 42.2 %
NRBC # BLD AUTO: <0.03 10E3/UL
NRBC BLD AUTO-RTO: 0 /100
PHOSPHATE SERPL-MCNC: 3.1 MG/DL (ref 2.5–4.5)
PLAT MORPH BLD: ABNORMAL
PLATELET # BLD AUTO: 74 10E3/UL (ref 150–450)
POTASSIUM SERPL-SCNC: 4.3 MMOL/L (ref 3.4–5.3)
PROT SERPL-MCNC: 6.6 G/DL (ref 6.4–8.3)
RBC # BLD AUTO: 3.03 10E6/UL (ref 4.4–5.9)
RBC MORPH BLD: ABNORMAL
SODIUM SERPL-SCNC: 138 MMOL/L (ref 135–145)
URATE SERPL-MCNC: 3.8 MG/DL (ref 3.4–7)
WBC # BLD AUTO: 1.09 10E3/UL (ref 4–11)

## 2025-08-14 PROCEDURE — 82728 ASSAY OF FERRITIN: CPT | Performed by: PHYSICIAN ASSISTANT

## 2025-08-14 PROCEDURE — 83540 ASSAY OF IRON: CPT | Performed by: PHYSICIAN ASSISTANT

## 2025-08-14 PROCEDURE — 36591 DRAW BLOOD OFF VENOUS DEVICE: CPT | Performed by: PHYSICIAN ASSISTANT

## 2025-08-14 PROCEDURE — 38222 DX BONE MARROW BX & ASPIR: CPT | Performed by: PHYSICIAN ASSISTANT

## 2025-08-14 PROCEDURE — 84550 ASSAY OF BLOOD/URIC ACID: CPT

## 2025-08-14 PROCEDURE — 250N000011 HC RX IP 250 OP 636: Performed by: PHYSICIAN ASSISTANT

## 2025-08-14 PROCEDURE — 86900 BLOOD TYPING SEROLOGIC ABO: CPT | Performed by: STUDENT IN AN ORGANIZED HEALTH CARE EDUCATION/TRAINING PROGRAM

## 2025-08-14 PROCEDURE — 86708 HEPATITIS A ANTIBODY: CPT | Performed by: PHYSICIAN ASSISTANT

## 2025-08-14 PROCEDURE — 81332 SERPINA1 GENE: CPT | Performed by: PHYSICIAN ASSISTANT

## 2025-08-14 PROCEDURE — 81256 HFE GENE: CPT | Performed by: PHYSICIAN ASSISTANT

## 2025-08-14 PROCEDURE — 81450 HL NEO GSAP 5-50DNA/DNA&RNA: CPT

## 2025-08-14 PROCEDURE — 85025 COMPLETE CBC W/AUTO DIFF WBC: CPT | Performed by: STUDENT IN AN ORGANIZED HEALTH CARE EDUCATION/TRAINING PROGRAM

## 2025-08-14 PROCEDURE — 82040 ASSAY OF SERUM ALBUMIN: CPT

## 2025-08-14 PROCEDURE — 84100 ASSAY OF PHOSPHORUS: CPT

## 2025-08-14 RX ORDER — HEPARIN SODIUM (PORCINE) LOCK FLUSH IV SOLN 100 UNIT/ML 100 UNIT/ML
5 SOLUTION INTRAVENOUS ONCE
Status: COMPLETED | OUTPATIENT
Start: 2025-08-14 | End: 2025-08-14

## 2025-08-14 RX ADMIN — Medication 5 ML: at 10:28

## 2025-08-14 RX ADMIN — Medication 5 ML: at 16:05

## 2025-08-14 ASSESSMENT — PAIN SCALES - GENERAL: PAINLEVEL_OUTOF10: NO PAIN (0)

## 2025-08-15 ENCOUNTER — ONCOLOGY VISIT (OUTPATIENT)
Dept: ONCOLOGY | Facility: CLINIC | Age: 65
End: 2025-08-15
Attending: STUDENT IN AN ORGANIZED HEALTH CARE EDUCATION/TRAINING PROGRAM
Payer: COMMERCIAL

## 2025-08-15 VITALS
RESPIRATION RATE: 18 BRPM | OXYGEN SATURATION: 99 % | DIASTOLIC BLOOD PRESSURE: 76 MMHG | HEART RATE: 76 BPM | TEMPERATURE: 98.5 F | SYSTOLIC BLOOD PRESSURE: 145 MMHG | WEIGHT: 177 LBS | BODY MASS INDEX: 26.91 KG/M2

## 2025-08-15 DIAGNOSIS — D46.C MDS (MYELODYSPLASTIC SYNDROME) WITH 5Q DELETION (H): Primary | ICD-10-CM

## 2025-08-15 PROCEDURE — G0463 HOSPITAL OUTPT CLINIC VISIT: HCPCS | Performed by: STUDENT IN AN ORGANIZED HEALTH CARE EDUCATION/TRAINING PROGRAM

## 2025-08-15 PROCEDURE — 99215 OFFICE O/P EST HI 40 MIN: CPT | Performed by: STUDENT IN AN ORGANIZED HEALTH CARE EDUCATION/TRAINING PROGRAM

## 2025-08-15 PROCEDURE — G2211 COMPLEX E/M VISIT ADD ON: HCPCS | Performed by: STUDENT IN AN ORGANIZED HEALTH CARE EDUCATION/TRAINING PROGRAM

## 2025-08-15 ASSESSMENT — PAIN SCALES - GENERAL: PAINLEVEL_OUTOF10: MILD PAIN (3)

## 2025-08-18 ENCOUNTER — DOCUMENTATION ONLY (OUTPATIENT)
Dept: ONCOLOGY | Facility: CLINIC | Age: 65
End: 2025-08-18
Payer: COMMERCIAL

## 2025-08-18 ENCOUNTER — PATIENT OUTREACH (OUTPATIENT)
Dept: ONCOLOGY | Facility: CLINIC | Age: 65
End: 2025-08-18
Payer: COMMERCIAL

## 2025-08-18 DIAGNOSIS — D46.C MDS (MYELODYSPLASTIC SYNDROME) WITH 5Q DELETION (H): Primary | ICD-10-CM

## 2025-08-19 ENCOUNTER — PATIENT OUTREACH (OUTPATIENT)
Dept: CARE COORDINATION | Facility: CLINIC | Age: 65
End: 2025-08-19

## 2025-08-19 ENCOUNTER — INFUSION THERAPY VISIT (OUTPATIENT)
Dept: INFUSION THERAPY | Facility: HOSPITAL | Age: 65
End: 2025-08-19
Attending: STUDENT IN AN ORGANIZED HEALTH CARE EDUCATION/TRAINING PROGRAM
Payer: COMMERCIAL

## 2025-08-19 VITALS
RESPIRATION RATE: 16 BRPM | DIASTOLIC BLOOD PRESSURE: 69 MMHG | HEART RATE: 69 BPM | TEMPERATURE: 98.1 F | OXYGEN SATURATION: 98 % | SYSTOLIC BLOOD PRESSURE: 140 MMHG

## 2025-08-19 DIAGNOSIS — D46.C MDS (MYELODYSPLASTIC SYNDROME) WITH 5Q DELETION (H): Primary | ICD-10-CM

## 2025-08-19 DIAGNOSIS — Z79.899 ENCOUNTER FOR LONG-TERM (CURRENT) USE OF HIGH-RISK MEDICATION: ICD-10-CM

## 2025-08-19 LAB
ABO + RH BLD: NORMAL
ALBUMIN SERPL BCG-MCNC: 3.6 G/DL (ref 3.5–5.2)
ALP SERPL-CCNC: 95 U/L (ref 40–150)
ALT SERPL W P-5'-P-CCNC: 76 U/L (ref 0–70)
ANION GAP SERPL CALCULATED.3IONS-SCNC: 10 MMOL/L (ref 7–15)
AST SERPL W P-5'-P-CCNC: 31 U/L (ref 0–45)
BASOPHILS # BLD MANUAL: 0 10E3/UL (ref 0–0.2)
BASOPHILS NFR BLD MANUAL: 0 %
BILIRUB SERPL-MCNC: 0.3 MG/DL
BLD GP AB SCN SERPL QL: NEGATIVE
BLD PROD TYP BPU: NORMAL
BLD PROD TYP BPU: NORMAL
BLOOD COMPONENT TYPE: NORMAL
BLOOD COMPONENT TYPE: NORMAL
BUN SERPL-MCNC: 17.1 MG/DL (ref 8–23)
CALCIUM SERPL-MCNC: 8.9 MG/DL (ref 8.8–10.4)
CHLORIDE SERPL-SCNC: 105 MMOL/L (ref 98–107)
CODING SYSTEM: NORMAL
CODING SYSTEM: NORMAL
CREAT SERPL-MCNC: 0.67 MG/DL (ref 0.67–1.17)
CROSSMATCH: NORMAL
CROSSMATCH: NORMAL
EGFRCR SERPLBLD CKD-EPI 2021: >90 ML/MIN/1.73M2
ELLIPTOCYTES BLD QL SMEAR: SLIGHT
EOSINOPHIL # BLD MANUAL: 0 10E3/UL (ref 0–0.7)
EOSINOPHIL NFR BLD MANUAL: 0 %
ERYTHROCYTE [DISTWIDTH] IN BLOOD BY AUTOMATED COUNT: 14.9 % (ref 10–15)
GLUCOSE SERPL-MCNC: 138 MG/DL (ref 70–99)
HCO3 SERPL-SCNC: 25 MMOL/L (ref 22–29)
HCT VFR BLD AUTO: 20.2 % (ref 40–53)
HGB BLD-MCNC: 6.7 G/DL (ref 13.3–17.7)
ISSUE DATE AND TIME: NORMAL
ISSUE DATE AND TIME: NORMAL
LYMPHOCYTES # BLD MANUAL: 0.57 10E3/UL (ref 0.8–5.3)
LYMPHOCYTES NFR BLD MANUAL: 44 %
MCH RBC QN AUTO: 26.9 PG (ref 26.5–33)
MCHC RBC AUTO-ENTMCNC: 33.2 G/DL (ref 31.5–36.5)
MCV RBC AUTO: 81.1 FL (ref 78–100)
MONOCYTES # BLD MANUAL: 0.13 10E3/UL (ref 0–1.3)
MONOCYTES NFR BLD MANUAL: 10 %
NEUTROPHILS # BLD MANUAL: 0.6 10E3/UL (ref 1.6–8.3)
NEUTROPHILS NFR BLD MANUAL: 46 %
PHOSPHATE SERPL-MCNC: 3.8 MG/DL (ref 2.5–4.5)
PLAT MORPH BLD: ABNORMAL
PLATELET # BLD AUTO: 52 10E3/UL (ref 150–450)
POTASSIUM SERPL-SCNC: 4.1 MMOL/L (ref 3.4–5.3)
PROT SERPL-MCNC: 6.2 G/DL (ref 6.4–8.3)
RBC # BLD AUTO: 2.49 10E6/UL (ref 4.4–5.9)
RBC MORPH BLD: ABNORMAL
SODIUM SERPL-SCNC: 140 MMOL/L (ref 135–145)
SPECIMEN EXP DATE BLD: NORMAL
UNIT ABO/RH: NORMAL
UNIT ABO/RH: NORMAL
UNIT NUMBER: NORMAL
UNIT NUMBER: NORMAL
UNIT STATUS: NORMAL
UNIT STATUS: NORMAL
UNIT TYPE ISBT: 9500
UNIT TYPE ISBT: 9500
URATE SERPL-MCNC: 3.8 MG/DL (ref 3.4–7)
WBC # BLD AUTO: 1.3 10E3/UL (ref 4–11)

## 2025-08-19 PROCEDURE — 250N000011 HC RX IP 250 OP 636: Performed by: STUDENT IN AN ORGANIZED HEALTH CARE EDUCATION/TRAINING PROGRAM

## 2025-08-19 PROCEDURE — 85014 HEMATOCRIT: CPT

## 2025-08-19 PROCEDURE — 36415 COLL VENOUS BLD VENIPUNCTURE: CPT

## 2025-08-19 PROCEDURE — 86923 COMPATIBILITY TEST ELECTRIC: CPT | Performed by: STUDENT IN AN ORGANIZED HEALTH CARE EDUCATION/TRAINING PROGRAM

## 2025-08-19 PROCEDURE — 250N000013 HC RX MED GY IP 250 OP 250 PS 637: Performed by: STUDENT IN AN ORGANIZED HEALTH CARE EDUCATION/TRAINING PROGRAM

## 2025-08-19 PROCEDURE — 85007 BL SMEAR W/DIFF WBC COUNT: CPT

## 2025-08-19 PROCEDURE — 84550 ASSAY OF BLOOD/URIC ACID: CPT

## 2025-08-19 PROCEDURE — P9040 RBC LEUKOREDUCED IRRADIATED: HCPCS | Performed by: STUDENT IN AN ORGANIZED HEALTH CARE EDUCATION/TRAINING PROGRAM

## 2025-08-19 PROCEDURE — 86901 BLOOD TYPING SEROLOGIC RH(D): CPT | Performed by: STUDENT IN AN ORGANIZED HEALTH CARE EDUCATION/TRAINING PROGRAM

## 2025-08-19 PROCEDURE — 258N000003 HC RX IP 258 OP 636: Performed by: STUDENT IN AN ORGANIZED HEALTH CARE EDUCATION/TRAINING PROGRAM

## 2025-08-19 PROCEDURE — 80053 COMPREHEN METABOLIC PANEL: CPT

## 2025-08-19 PROCEDURE — 84100 ASSAY OF PHOSPHORUS: CPT

## 2025-08-19 PROCEDURE — 36430 TRANSFUSION BLD/BLD COMPNT: CPT

## 2025-08-19 RX ORDER — EPINEPHRINE 1 MG/ML
0.3 INJECTION, SOLUTION INTRAMUSCULAR; SUBCUTANEOUS EVERY 5 MIN PRN
Status: DISCONTINUED | OUTPATIENT
Start: 2025-08-19 | End: 2025-08-19 | Stop reason: HOSPADM

## 2025-08-19 RX ORDER — ACETAMINOPHEN 325 MG/1
650 TABLET ORAL SEE ADMIN INSTRUCTIONS
Start: 2025-08-19

## 2025-08-19 RX ORDER — ACETAMINOPHEN 325 MG/1
650 TABLET ORAL SEE ADMIN INSTRUCTIONS
Status: DISCONTINUED | OUTPATIENT
Start: 2025-08-19 | End: 2025-08-19 | Stop reason: HOSPADM

## 2025-08-19 RX ORDER — HEPARIN SODIUM (PORCINE) LOCK FLUSH IV SOLN 100 UNIT/ML 100 UNIT/ML
5 SOLUTION INTRAVENOUS
OUTPATIENT
Start: 2025-08-19

## 2025-08-19 RX ORDER — DIPHENHYDRAMINE HCL 50 MG
50 CAPSULE ORAL SEE ADMIN INSTRUCTIONS
Status: DISCONTINUED | OUTPATIENT
Start: 2025-08-19 | End: 2025-08-19 | Stop reason: HOSPADM

## 2025-08-19 RX ORDER — DIPHENHYDRAMINE HYDROCHLORIDE 50 MG/ML
50 INJECTION, SOLUTION INTRAMUSCULAR; INTRAVENOUS
Status: DISCONTINUED | OUTPATIENT
Start: 2025-08-19 | End: 2025-08-19 | Stop reason: HOSPADM

## 2025-08-19 RX ORDER — EPINEPHRINE 1 MG/ML
0.3 INJECTION, SOLUTION INTRAMUSCULAR; SUBCUTANEOUS EVERY 5 MIN PRN
OUTPATIENT
Start: 2025-08-19

## 2025-08-19 RX ORDER — HEPARIN SODIUM (PORCINE) LOCK FLUSH IV SOLN 100 UNIT/ML 100 UNIT/ML
5 SOLUTION INTRAVENOUS
Status: DISCONTINUED | OUTPATIENT
Start: 2025-08-19 | End: 2025-08-19 | Stop reason: HOSPADM

## 2025-08-19 RX ORDER — HEPARIN SODIUM,PORCINE 10 UNIT/ML
5-20 VIAL (ML) INTRAVENOUS DAILY PRN
OUTPATIENT
Start: 2025-08-19

## 2025-08-19 RX ORDER — DIPHENHYDRAMINE HYDROCHLORIDE 50 MG/ML
50 INJECTION, SOLUTION INTRAMUSCULAR; INTRAVENOUS
Start: 2025-08-19

## 2025-08-19 RX ORDER — DIPHENHYDRAMINE HCL 50 MG
50 CAPSULE ORAL SEE ADMIN INSTRUCTIONS
Start: 2025-08-19

## 2025-08-19 RX ADMIN — DIPHENHYDRAMINE HYDROCHLORIDE 50 MG: 50 CAPSULE ORAL at 11:03

## 2025-08-19 RX ADMIN — Medication 5 ML: at 15:10

## 2025-08-19 RX ADMIN — SODIUM CHLORIDE 250 ML: 9 INJECTION, SOLUTION INTRAVENOUS at 11:38

## 2025-08-19 RX ADMIN — ACETAMINOPHEN 650 MG: 325 TABLET ORAL at 11:02

## 2025-08-19 ASSESSMENT — PAIN SCALES - GENERAL: PAINLEVEL_OUTOF10: NO PAIN (0)

## 2025-08-20 ENCOUNTER — HOSPITAL ENCOUNTER (INPATIENT)
Facility: CLINIC | Age: 65
End: 2025-08-20
Attending: STUDENT IN AN ORGANIZED HEALTH CARE EDUCATION/TRAINING PROGRAM | Admitting: STUDENT IN AN ORGANIZED HEALTH CARE EDUCATION/TRAINING PROGRAM
Payer: COMMERCIAL

## 2025-08-20 PROBLEM — C92.00: Status: ACTIVE | Noted: 2025-08-20

## 2025-08-20 ASSESSMENT — ACTIVITIES OF DAILY LIVING (ADL)
ADLS_ACUITY_SCORE: 29
ADLS_ACUITY_SCORE: 29
DOING_ERRANDS_INDEPENDENTLY_DIFFICULTY: NO
ADLS_ACUITY_SCORE: 29
HEARING_DIFFICULTY_OR_DEAF: NO
DIFFICULTY_COMMUNICATING: NO
FALL_HISTORY_WITHIN_LAST_SIX_MONTHS: NO
ADLS_ACUITY_SCORE: 29
ADLS_ACUITY_SCORE: 43
WALKING_OR_CLIMBING_STAIRS_DIFFICULTY: NO
WEAR_GLASSES_OR_BLIND: YES
CHANGE_IN_FUNCTIONAL_STATUS_SINCE_ONSET_OF_CURRENT_ILLNESS/INJURY: NO
ADLS_ACUITY_SCORE: 29
DIFFICULTY_EATING/SWALLOWING: NO
CONCENTRATING,_REMEMBERING_OR_MAKING_DECISIONS_DIFFICULTY: NO
TOILETING_ISSUES: NO
ADLS_ACUITY_SCORE: 29
ADLS_ACUITY_SCORE: 29
ADLS_ACUITY_SCORE: 20
DRESSING/BATHING_DIFFICULTY: NO

## 2025-08-21 ENCOUNTER — APPOINTMENT (OUTPATIENT)
Dept: OCCUPATIONAL THERAPY | Facility: CLINIC | Age: 65
End: 2025-08-21
Attending: PHYSICIAN ASSISTANT
Payer: COMMERCIAL

## 2025-08-21 ASSESSMENT — ACTIVITIES OF DAILY LIVING (ADL)
ADLS_ACUITY_SCORE: 29
IADL_COMMENTS: SEE ABOVE.
ADLS_ACUITY_SCORE: 29
PREVIOUS_RESPONSIBILITIES: MEAL PREP;HOUSEKEEPING;LAUNDRY;SHOPPING;YARDWORK;MEDICATION MANAGEMENT;FINANCES
ADLS_ACUITY_SCORE: 29

## 2025-08-22 ASSESSMENT — ACTIVITIES OF DAILY LIVING (ADL)
ADLS_ACUITY_SCORE: 33
ADLS_ACUITY_SCORE: 29
ADLS_ACUITY_SCORE: 33
ADLS_ACUITY_SCORE: 29
ADLS_ACUITY_SCORE: 29
ADLS_ACUITY_SCORE: 33
ADLS_ACUITY_SCORE: 29
ADLS_ACUITY_SCORE: 33
ADLS_ACUITY_SCORE: 33
ADLS_ACUITY_SCORE: 29
ADLS_ACUITY_SCORE: 29
ADLS_ACUITY_SCORE: 33
ADLS_ACUITY_SCORE: 29
ADLS_ACUITY_SCORE: 29
ADLS_ACUITY_SCORE: 33
ADLS_ACUITY_SCORE: 33

## 2025-08-23 ASSESSMENT — ACTIVITIES OF DAILY LIVING (ADL)
ADLS_ACUITY_SCORE: 33
DEPENDENT_IADLS:: INDEPENDENT
ADLS_ACUITY_SCORE: 33
ADLS_ACUITY_SCORE: 33

## 2025-08-24 LAB
A1AT PHENOTYP SERPL-IMP: NORMAL
A1AT SERPL-MCNC: 151 MG/DL
A1AT SS SERPL-MCNC: NEGATIVE G/L
A1AT SZ SERPL-MCNC: NORMAL G/L
A1AT ZZ SERPL-MCNC: NEGATIVE G/L
SPECIMEN SOURCE: NORMAL

## 2025-08-24 ASSESSMENT — ACTIVITIES OF DAILY LIVING (ADL)
ADLS_ACUITY_SCORE: 33

## 2025-08-25 ASSESSMENT — ACTIVITIES OF DAILY LIVING (ADL)
ADLS_ACUITY_SCORE: 33

## 2025-08-26 ASSESSMENT — ACTIVITIES OF DAILY LIVING (ADL)
ADLS_ACUITY_SCORE: 33

## 2025-08-27 ASSESSMENT — ACTIVITIES OF DAILY LIVING (ADL)
ADLS_ACUITY_SCORE: 33

## 2025-08-28 ENCOUNTER — APPOINTMENT (OUTPATIENT)
Dept: OCCUPATIONAL THERAPY | Facility: CLINIC | Age: 65
End: 2025-08-28
Attending: STUDENT IN AN ORGANIZED HEALTH CARE EDUCATION/TRAINING PROGRAM
Payer: COMMERCIAL

## 2025-08-28 ASSESSMENT — ACTIVITIES OF DAILY LIVING (ADL)
ADLS_ACUITY_SCORE: 33

## 2025-08-29 ASSESSMENT — ACTIVITIES OF DAILY LIVING (ADL)
ADLS_ACUITY_SCORE: 33

## 2025-08-30 ENCOUNTER — APPOINTMENT (OUTPATIENT)
Dept: GENERAL RADIOLOGY | Facility: CLINIC | Age: 65
End: 2025-08-30
Attending: PHYSICIAN ASSISTANT
Payer: COMMERCIAL

## 2025-08-30 PROCEDURE — 71046 X-RAY EXAM CHEST 2 VIEWS: CPT

## 2025-08-30 ASSESSMENT — ACTIVITIES OF DAILY LIVING (ADL)
ADLS_ACUITY_SCORE: 33

## 2025-08-31 ASSESSMENT — ACTIVITIES OF DAILY LIVING (ADL)
ADLS_ACUITY_SCORE: 33

## 2025-09-01 ASSESSMENT — ACTIVITIES OF DAILY LIVING (ADL)
ADLS_ACUITY_SCORE: 33

## 2025-09-02 ASSESSMENT — ACTIVITIES OF DAILY LIVING (ADL)
ADLS_ACUITY_SCORE: 33

## 2025-09-03 LAB
CULTURE HARVEST COMPLETE DATE: NORMAL
INTERPRETATION: NORMAL

## 2025-09-03 ASSESSMENT — ACTIVITIES OF DAILY LIVING (ADL)
ADLS_ACUITY_SCORE: 33

## 2025-09-04 ASSESSMENT — ACTIVITIES OF DAILY LIVING (ADL)
ADLS_ACUITY_SCORE: 33

## (undated) RX ORDER — LIDOCAINE HYDROCHLORIDE 10 MG/ML
INJECTION, SOLUTION EPIDURAL; INFILTRATION; INTRACAUDAL; PERINEURAL
Status: DISPENSED
Start: 2025-08-05

## (undated) RX ORDER — HEPARIN SODIUM (PORCINE) LOCK FLUSH IV SOLN 100 UNIT/ML 100 UNIT/ML
SOLUTION INTRAVENOUS
Status: DISPENSED
Start: 2025-08-05

## (undated) RX ORDER — FENTANYL CITRATE 50 UG/ML
INJECTION, SOLUTION INTRAMUSCULAR; INTRAVENOUS
Status: DISPENSED
Start: 2025-08-05